# Patient Record
Sex: FEMALE | Employment: PART TIME | URBAN - METROPOLITAN AREA
[De-identification: names, ages, dates, MRNs, and addresses within clinical notes are randomized per-mention and may not be internally consistent; named-entity substitution may affect disease eponyms.]

---

## 2018-01-26 ENCOUNTER — OFFICE VISIT (OUTPATIENT)
Dept: ORTHOPEDIC SURGERY | Age: 63
End: 2018-01-26

## 2018-01-26 VITALS
BODY MASS INDEX: 31.89 KG/M2 | TEMPERATURE: 98 F | HEIGHT: 64 IN | SYSTOLIC BLOOD PRESSURE: 146 MMHG | HEART RATE: 81 BPM | WEIGHT: 186.8 LBS | OXYGEN SATURATION: 99 % | DIASTOLIC BLOOD PRESSURE: 71 MMHG | RESPIRATION RATE: 18 BRPM

## 2018-01-26 DIAGNOSIS — M79.642 LEFT HAND PAIN: ICD-10-CM

## 2018-01-26 DIAGNOSIS — M25.562 CHRONIC PAIN OF LEFT KNEE: Primary | ICD-10-CM

## 2018-01-26 DIAGNOSIS — G89.29 CHRONIC PAIN OF LEFT KNEE: Primary | ICD-10-CM

## 2018-01-26 DIAGNOSIS — M65.352 TRIGGER LITTLE FINGER OF LEFT HAND: ICD-10-CM

## 2018-01-26 DIAGNOSIS — M17.12 PRIMARY OSTEOARTHRITIS OF LEFT KNEE: ICD-10-CM

## 2018-01-26 RX ORDER — BETAMETHASONE SODIUM PHOSPHATE AND BETAMETHASONE ACETATE 3; 3 MG/ML; MG/ML
6 INJECTION, SUSPENSION INTRA-ARTICULAR; INTRALESIONAL; INTRAMUSCULAR; SOFT TISSUE ONCE
Qty: 1 ML | Refills: 0
Start: 2018-01-26 | End: 2018-01-26

## 2018-01-26 NOTE — PROGRESS NOTES
Patient: Aimee Jimenez                MRN: [id number]       SSN: [id number]  YOB: 1955        AGE: 65 y.o. SEX: female  Body mass index is 32.06 kg/(m^2). PCP: No primary care provider on file. 01/26/18  HISTORY: Iván Garnett presents today with left fifth trigger finger pain and left knee pain. The left knee pain she is very fed up and frustrated with. It is severe. She has pain at night and pain with activities of daily living. She has tried injections, anti-inflammatories, and therapy to no avail. She has gone into a valgus collapse pattern, i.e. knock-kneed and instability does cause her problems as well. She has night pain and difficulties with stairs. Otherwise, she denies fevers, chills, night sweats, or weight loss. She denies other joint problems. PHYSICAL EXAMINATION:  On examination today, she is a pleasant enough lady. She stands in at least 15-18° of valgus, most of which is correctable passively. The hips are noncontributory. Both feet are warm and well-perfused. There is good pulse present. She is missing a couple degrees of extension. She bends fairly well. With regards to the left hand, the wrist is noncontributory. She has thickening of the A1 pulley at the base of the fifth. RADIOGRAPHS:  Review of her x-rays, AP, tunnel, lateral, and skyline, confirm end-staged arthritis of the left knee. The hand is negative. IMPRESSION:  My overall impression is trigger finger, fifth, and end-staged arthritis of the left knee with valgus deformity. PROCEDURE:  Under aseptic conditions and after informed, written consent with a time out, the left fifth trigger finger A1 pulley was injected with a half and half Celestone preparation, which was well tolerated.     PLAN:  We had a lengthy discussion regarding risks and benefits involving total joint surgery including but not limited to bleeding, infection, DVT, pulmonary embolism, anesthetic complications, blood loss requiring transfusion, pain, stiffness, arthrofibrosis, implant longevity, and we also discussed that knee replacements are not 100% pain-free, that she will have to actively bend and straighten the knee 10 times a day to avoid arthrofibrosis and stiffness, and that the first few weeks after surgery are fairly rough. All questions were answered, and he would like to proceed. She will return to see us in a few weeks time to check on her finger. REVIEW OF SYSTEMS:      CON: negative for weight loss, fever  EYE: negative for double vision  ENT: negative for hoarseness  RS:   negative for Tb  GI:    negative for blood in stool  :  negative for blood in urine  Other systems reviewed and noted below. Past Medical History:   Diagnosis Date    Arthritis        History reviewed. No pertinent family history. Current Outpatient Prescriptions   Medication Sig Dispense Refill    betamethasone (CELESTONE SOLUSPAN) 6 mg/mL injection 1 mL by Intra artICUlar route once for 1 dose. 1 mL 0       No Known Allergies    History reviewed. No pertinent surgical history. Social History     Social History    Marital status:      Spouse name: N/A    Number of children: N/A    Years of education: N/A     Occupational History    Not on file. Social History Main Topics    Smoking status: Former Smoker    Smokeless tobacco: Never Used    Alcohol use No    Drug use: No    Sexual activity: Not on file     Other Topics Concern    Not on file     Social History Narrative    No narrative on file       Visit Vitals    /71    Pulse 81    Temp 98 °F (36.7 °C) (Oral)    Resp 18    Ht 5' 4\" (1.626 m)    Wt 186 lb 12.8 oz (84.7 kg)    SpO2 99%    BMI 32.06 kg/m2         PHYSICAL EXAMINATION:  GENERAL: Alert and oriented x3, in no acute distress, well-developed, well-nourished, afebrile. HEART: No JVD.   EYES: No scleral icterus   NECK: No significant lymphadenopathy   LUNGS: No respiratory compromise or indrawing  ABDOMEN: Soft, non-tender, non-distended. Electronically signed by:  Alla Araiza MD

## 2018-03-22 ENCOUNTER — TELEPHONE (OUTPATIENT)
Dept: ORTHOPEDIC SURGERY | Age: 63
End: 2018-03-22

## 2018-03-22 NOTE — TELEPHONE ENCOUNTER
Patient advised she may  DMV form at [facility] location.

## 2018-03-22 NOTE — TELEPHONE ENCOUNTER
PATIENT IS REQUESTING A HANDICAP STICKER FROM DR. Nathaniel Nance. PLEASE ADVISE PATIENT [phone].

## 2018-03-26 ENCOUNTER — DOCUMENTATION ONLY (OUTPATIENT)
Dept: ORTHOPEDIC SURGERY | Age: 63
End: 2018-03-26

## 2018-03-26 NOTE — PROGRESS NOTES
Pt dropped off [place] paperwork to the [facility] location on 3/23/18. Please advise pt @ [phone] when complete.

## 2018-03-30 ENCOUNTER — TELEPHONE (OUTPATIENT)
Dept: ORTHOPEDIC SURGERY | Age: 63
End: 2018-03-30

## 2018-03-30 DIAGNOSIS — M17.12 PRIMARY OSTEOARTHRITIS OF LEFT KNEE: ICD-10-CM

## 2018-03-30 DIAGNOSIS — Z01.818 PREOP EXAMINATION: Primary | ICD-10-CM

## 2018-03-30 NOTE — TELEPHONE ENCOUNTER
Patient's SX has been moved from 4/30/18 up to 4/9/18. She dropped paperwork off at the [facility] office and her job is requesting paperwork back prior to [facility]. Please call her when completed. [phone]

## 2018-04-18 ENCOUNTER — HOSPITAL ENCOUNTER (OUTPATIENT)
Dept: PREADMISSION TESTING | Age: 63
Discharge: HOME | End: 2018-04-18
Payer: COMMERCIAL

## 2018-04-18 ENCOUNTER — HOSPITAL ENCOUNTER (OUTPATIENT)
Dept: GENERAL RADIOLOGY | Age: 63
Discharge: HOME | End: 2018-04-18
Payer: COMMERCIAL

## 2018-04-18 DIAGNOSIS — Z01.818 PREOP EXAMINATION: ICD-10-CM

## 2018-04-18 DIAGNOSIS — M17.12 PRIMARY OSTEOARTHRITIS OF LEFT KNEE: ICD-10-CM

## 2018-04-18 LAB
ABO + RH BLD: NORMAL
ALBUMIN SERPL-MCNC: 4.1 G/DL (ref 3.4–5)
ANION GAP SERPL CALC-SCNC: 7 MMOL/L (ref 3–18)
APPEARANCE UR: CLEAR
APTT PPP: 33.2 SEC (ref 23–36.4)
ATRIAL RATE: 77 BPM
BACTERIA URNS QL MICRO: ABNORMAL /HPF
BASOPHILS # BLD: 0 K/UL (ref 0–0.1)
BASOPHILS NFR BLD: 0 % (ref 0–2)
BILIRUB UR QL: NEGATIVE
BLOOD GROUP ANTIBODIES SERPL: NORMAL
BUN SERPL-MCNC: 12 MG/DL (ref 7–18)
BUN/CREAT SERPL: 21 (ref 12–20)
CALCIUM SERPL-MCNC: 9.8 MG/DL (ref 8.5–10.1)
CALCULATED P AXIS, ECG09: 70 DEGREES
CALCULATED R AXIS, ECG10: 6 DEGREES
CALCULATED T AXIS, ECG11: 7 DEGREES
CHLORIDE SERPL-SCNC: 103 MMOL/L (ref 100–108)
CO2 SERPL-SCNC: 28 MMOL/L (ref 21–32)
COLOR UR: YELLOW
CREAT SERPL-MCNC: 0.56 MG/DL (ref 0.6–1.3)
DIAGNOSIS, 93000: NORMAL
DIFFERENTIAL METHOD BLD: NORMAL
EOSINOPHIL # BLD: 0.1 K/UL (ref 0–0.4)
EOSINOPHIL NFR BLD: 1 % (ref 0–5)
EPITH CASTS URNS QL MICRO: ABNORMAL /LPF (ref 0–5)
ERYTHROCYTE [DISTWIDTH] IN BLOOD BY AUTOMATED COUNT: 12.3 % (ref 11.6–14.5)
EST. AVERAGE GLUCOSE BLD GHB EST-MCNC: 131 MG/DL
GLUCOSE SERPL-MCNC: 113 MG/DL (ref 74–99)
GLUCOSE UR STRIP.AUTO-MCNC: NEGATIVE MG/DL
HBA1C MFR BLD: 6.2 % (ref 4.2–5.6)
HCT VFR BLD AUTO: 42.8 % (ref 35–45)
HGB BLD-MCNC: 14.5 G/DL (ref 12–16)
HGB UR QL STRIP: ABNORMAL
INR PPP: 1 (ref 0.8–1.2)
KETONES UR QL STRIP.AUTO: NEGATIVE MG/DL
LEUKOCYTE ESTERASE UR QL STRIP.AUTO: NEGATIVE
LYMPHOCYTES # BLD: 2.1 K/UL (ref 0.9–3.6)
LYMPHOCYTES NFR BLD: 26 % (ref 21–52)
MCH RBC QN AUTO: 29.1 PG (ref 24–34)
MCHC RBC AUTO-ENTMCNC: 33.9 G/DL (ref 31–37)
MCV RBC AUTO: 85.8 FL (ref 74–97)
MONOCYTES # BLD: 0.4 K/UL (ref 0.05–1.2)
MONOCYTES NFR BLD: 5 % (ref 3–10)
NEUTS SEG # BLD: 5.3 K/UL (ref 1.8–8)
NEUTS SEG NFR BLD: 68 % (ref 40–73)
NITRITE UR QL STRIP.AUTO: NEGATIVE
P-R INTERVAL, ECG05: 130 MS
PH UR STRIP: 5 [PH] (ref 5–8)
PLATELET # BLD AUTO: 291 K/UL (ref 135–420)
PMV BLD AUTO: 10 FL (ref 9.2–11.8)
POTASSIUM SERPL-SCNC: 4.2 MMOL/L (ref 3.5–5.5)
PROT UR STRIP-MCNC: NEGATIVE MG/DL
PROTHROMBIN TIME: 12.4 SEC (ref 11.5–15.2)
Q-T INTERVAL, ECG07: 364 MS
QRS DURATION, ECG06: 88 MS
QTC CALCULATION (BEZET), ECG08: 411 MS
RBC # BLD AUTO: 4.99 M/UL (ref 4.2–5.3)
RBC #/AREA URNS HPF: ABNORMAL /HPF (ref 0–5)
SODIUM SERPL-SCNC: 138 MMOL/L (ref 136–145)
SP GR UR REFRACTOMETRY: 1.02 (ref 1–1.03)
SPECIMEN EXP DATE BLD: NORMAL
UROBILINOGEN UR QL STRIP.AUTO: 0.2 EU/DL (ref 0.2–1)
VENTRICULAR RATE, ECG03: 77 BPM
WBC # BLD AUTO: 7.8 K/UL (ref 4.6–13.2)
WBC URNS QL MICRO: NEGATIVE /HPF (ref 0–4)

## 2018-04-18 PROCEDURE — 85025 COMPLETE CBC W/AUTO DIFF WBC: CPT | Performed by: ORTHOPAEDIC SURGERY

## 2018-04-18 PROCEDURE — 81001 URINALYSIS AUTO W/SCOPE: CPT | Performed by: ORTHOPAEDIC SURGERY

## 2018-04-18 PROCEDURE — 85610 PROTHROMBIN TIME: CPT | Performed by: ORTHOPAEDIC SURGERY

## 2018-04-18 PROCEDURE — 86900 BLOOD TYPING SEROLOGIC ABO: CPT | Performed by: ORTHOPAEDIC SURGERY

## 2018-04-18 PROCEDURE — 85730 THROMBOPLASTIN TIME PARTIAL: CPT | Performed by: ORTHOPAEDIC SURGERY

## 2018-04-18 PROCEDURE — 83036 HEMOGLOBIN GLYCOSYLATED A1C: CPT | Performed by: ORTHOPAEDIC SURGERY

## 2018-04-18 PROCEDURE — 36415 COLL VENOUS BLD VENIPUNCTURE: CPT | Performed by: ORTHOPAEDIC SURGERY

## 2018-04-18 PROCEDURE — 93005 ELECTROCARDIOGRAM TRACING: CPT

## 2018-04-18 PROCEDURE — 82040 ASSAY OF SERUM ALBUMIN: CPT | Performed by: ORTHOPAEDIC SURGERY

## 2018-04-18 PROCEDURE — 80048 BASIC METABOLIC PNL TOTAL CA: CPT | Performed by: ORTHOPAEDIC SURGERY

## 2018-04-18 PROCEDURE — 87086 URINE CULTURE/COLONY COUNT: CPT | Performed by: ORTHOPAEDIC SURGERY

## 2018-04-18 PROCEDURE — 71046 X-RAY EXAM CHEST 2 VIEWS: CPT

## 2018-04-18 RX ORDER — ERGOCALCIFEROL 1.25 MG/1
5000 CAPSULE ORAL DAILY
Refills: 0 | COMMUNITY
Start: 2018-02-22

## 2018-04-19 LAB
BACTERIA SPEC CULT: NORMAL
SERVICE CMNT-IMP: NORMAL

## 2018-04-19 NOTE — PROGRESS NOTES
Anh Ludwig attended the Joint Replacement Pre-Operative class on 4/18/18. Topics discussed included surgery preparation, what to expect the day of surgery, medications, physical and occupational therapy, and discharge planning. It was discussed that this is considered an elective surgery and that prior to the surgery they need to make decisions such as arranging for help at home once they are discharged. She was given a knee patient education notebook to take home. Opportunity was given to ask questions and phone number of the Orthopaedic Nurse Clinician was given for any questions or concerns that may arise later.

## 2018-04-24 ENCOUNTER — OFFICE VISIT (OUTPATIENT)
Dept: ORTHOPEDIC SURGERY | Facility: CLINIC | Age: 63
End: 2018-04-24

## 2018-04-24 DIAGNOSIS — M17.12 PRIMARY OSTEOARTHRITIS OF LEFT KNEE: Primary | ICD-10-CM

## 2018-04-24 DIAGNOSIS — Z01.818 ENCOUNTER FOR PREOPERATIVE EXAMINATION FOR GENERAL SURGICAL PROCEDURE: ICD-10-CM

## 2018-04-25 ENCOUNTER — OFFICE VISIT (OUTPATIENT)
Dept: ORTHOPEDIC SURGERY | Facility: CLINIC | Age: 63
End: 2018-04-25

## 2018-04-25 VITALS
SYSTOLIC BLOOD PRESSURE: 139 MMHG | OXYGEN SATURATION: 99 % | HEIGHT: 64 IN | DIASTOLIC BLOOD PRESSURE: 72 MMHG | TEMPERATURE: 98.3 F | HEART RATE: 88 BPM | BODY MASS INDEX: 31.31 KG/M2 | RESPIRATION RATE: 18 BRPM | WEIGHT: 183.4 LBS

## 2018-04-25 DIAGNOSIS — M17.12 PRIMARY OSTEOARTHRITIS OF LEFT KNEE: Primary | ICD-10-CM

## 2018-04-25 RX ORDER — ACETAMINOPHEN 325 MG/1
1000 TABLET ORAL ONCE
Status: CANCELLED | OUTPATIENT
Start: 2018-04-25 | End: 2018-04-25

## 2018-04-25 RX ORDER — CELECOXIB 100 MG/1
400 CAPSULE ORAL ONCE
Status: CANCELLED | OUTPATIENT
Start: 2018-04-25 | End: 2018-04-25

## 2018-04-25 RX ORDER — PREGABALIN 25 MG/1
75 CAPSULE ORAL ONCE
Status: CANCELLED | OUTPATIENT
Start: 2018-04-25 | End: 2018-04-25

## 2018-04-25 RX ORDER — WARFARIN 1 MG/1
10 TABLET ORAL ONCE
Status: CANCELLED | OUTPATIENT
Start: 2018-04-25 | End: 2018-04-25

## 2018-04-25 NOTE — H&P
[address] [facility], [place], operated by [facility], [address] [facility].           HISTORY & PHYSICAL      Patient: Tari Pelletier                MRN: [id number]       SSN: [id number]  YOB: 1955        AGE: 59 y.o. SEX: female  Body mass index is 31.48 kg/(m^2). PCP: Paula Araujo MD  04/25/18      CC: left knee end stage OA  Problem List Items Addressed This Visit     None      Visit Diagnoses     Primary osteoarthritis of left knee    -  Primary            HPI:  The patient is a pleasant 59 y.o. whom has end stage OA of their Left knee and has failed conservative treatment including but not limited to NSAIDS, cortisone injections, viscosupplementation, PT, and pain medicine. Due to the current findings and affected activities of daily living, surgical intervention is indicated. The alternatives, risks, complications, as well as expected outcome were discussed. These include but are not limited to infection, blood loss, need for blood transfusion, neurovascular damage, DVT, PE,  post-op stiffness and pain, leg length discrepancy, dislocation, anesthetic complications, prothesis longevity, need for more surgery, MI, stroke, and even death. The patient understands and wishes to proceed with surgery. Past Medical History:   Diagnosis Date    Arthritis          Current Outpatient Prescriptions:     VITAMIN D2 50,000 unit capsule, Take 50,000 Units by mouth every Monday, Wednesday, Friday., Disp: , Rfl: 0    No Known Allergies    Social History     Social History    Marital status:      Spouse name: N/A    Number of children: N/A    Years of education: N/A     Occupational History    Not on file.      Social History Main Topics    Smoking status: Former Smoker    Smokeless tobacco: Never Used    Alcohol use No    Drug use: No    Sexual activity: Not on file     Other Topics Concern    Not on file     Social History Narrative       Past Surgical History:   Procedure Laterality Date    HX COLONOSCOPY  2009    HX HEMORRHOIDECTOMY  2016    HX HYSTERECTOMY         Family History:  Non-contributory. PE:  Visit Vitals    /72    Pulse 88    Temp 98.3 °F (36.8 °C) (Oral)    Resp 18    Ht 5' 4\" (1.626 m)    Wt 183 lb 6.4 oz (83.2 kg)    SpO2 99%    BMI 31.48 kg/m2     A&O X3, NAD, well develop, well nourished  Heart: S1-S2, rrr  Lungs: CTA bilat  Abd: soft, nt, nt, + bs in all quadrants  Ext:  Pos distal pulses to DP, PT      X-ray: left knee shows end stage OA    Labs: labs were reviewed and wnl.  ua neg    A:  Left  knee end stage OA    P:  At this point we will move forward with surgery. Again, the alternatives, risks, complications, as well as expected outcome were discussed and the patient wishes to proceed with surgery. Pt has been instructed to stop aspirin, nsaids, rheumatologic medications and blood thinners. They have also been instructed to continue on any heart and bp meds and to take them the morning of surgery with sips of water. The patient will require in patient admission due to above stated medical conditions as well the the surgical challenges given the anatomy and bone quality.          Marina Larson

## 2018-04-25 NOTE — PATIENT INSTRUCTIONS
Patient: Ying Hinkle                MRN: [id number]       SSN: [id number]  YOB: 1955        AGE: 59 y.o. SEX: female  Body mass index is 31.48 kg/(m^2). 04/25/18    DO:  1:  Sit with the leg out straight 5-10 min every hour while awake. Keep the toes pointed       up towards the harriet. 2.  Bend your knee 5-10 min every hour. Bend it to the point of pain and hold it for 5-10       Min. 3.  ICE your knee 20 min every hour    DO NOT:    1. Do not place anything under your knee to prop it up  2. Do not sit in the recliner chair. Patient: Ying Hinkle                MRN: [id number]       SSN: [id number]  YOB: 1955        AGE: 59 y.o. SEX: female  Body mass index is 31.48 kg/(m^2). 04/25/18    DO:  1:  Sit with the leg out straight 5-10 min every hour while awake. Keep the toes pointed       up towards the harriet. 2.  Bend your knee 5-10 min every hour. Bend it to the point of pain and hold it for 5-10       Min. 3.  ICE your knee 20 min every hour    DO NOT:    1. Do not place anything under your knee to prop it up  2. Do not sit in the recliner chair.

## 2018-05-01 ENCOUNTER — TELEPHONE (OUTPATIENT)
Dept: ORTHOPEDIC SURGERY | Facility: CLINIC | Age: 63
End: 2018-05-01

## 2018-05-01 ENCOUNTER — ANESTHESIA EVENT (OUTPATIENT)
Dept: SURGERY | Age: 63
DRG: 470 | End: 2018-05-01
Payer: COMMERCIAL

## 2018-05-01 NOTE — TELEPHONE ENCOUNTER
PATIENT CALLED FOR DR. Ann Marie Prescott. PATIENT SAID SHE IS HAVING SX DONE TOMORROW  5/2/18 FOR HER LEFT KNEE BY DR. Ann Marie Prescott. PATIENT WOULD LIKE TO KNOW IF IT IS OKAY FOR HER TO TAKE TYLENOL MEDICATION. PATIENT TEL. [phone].

## 2018-05-01 NOTE — TELEPHONE ENCOUNTER
Spoke with patient, she stated she has a headache and was wondering if it was ok for her to take tylenol. Advised patient that per LAMAR Ramos ok to take OTC tylenol as directed. Patient verbalized understanding.

## 2018-05-02 ENCOUNTER — ANESTHESIA (OUTPATIENT)
Dept: SURGERY | Age: 63
DRG: 470 | End: 2018-05-02
Payer: COMMERCIAL

## 2018-05-02 ENCOUNTER — APPOINTMENT (OUTPATIENT)
Dept: GENERAL RADIOLOGY | Age: 63
DRG: 470 | End: 2018-05-02
Attending: PHYSICIAN ASSISTANT
Payer: COMMERCIAL

## 2018-05-02 ENCOUNTER — HOSPITAL ENCOUNTER (INPATIENT)
Age: 63
LOS: 1 days | Discharge: HOME HEALTH | DRG: 470 | End: 2018-05-03
Attending: ORTHOPAEDIC SURGERY | Admitting: ORTHOPAEDIC SURGERY
Payer: COMMERCIAL

## 2018-05-02 DIAGNOSIS — M17.10 ARTHRITIS OF KNEE: Primary | ICD-10-CM

## 2018-05-02 PROCEDURE — 77030019557 HC ELECTRD VES SEAL MEDT -F: Performed by: ORTHOPAEDIC SURGERY

## 2018-05-02 PROCEDURE — 74011250636 HC RX REV CODE- 250/636

## 2018-05-02 PROCEDURE — 74011000258 HC RX REV CODE- 258: Performed by: ORTHOPAEDIC SURGERY

## 2018-05-02 PROCEDURE — 77030012935 HC DRSG AQUACEL BMS -B: Performed by: ORTHOPAEDIC SURGERY

## 2018-05-02 PROCEDURE — 77030012890: Performed by: ORTHOPAEDIC SURGERY

## 2018-05-02 PROCEDURE — 77030027138 HC INCENT SPIROMETER -A

## 2018-05-02 PROCEDURE — 74011250636 HC RX REV CODE- 250/636: Performed by: NURSE ANESTHETIST, CERTIFIED REGISTERED

## 2018-05-02 PROCEDURE — 74011000250 HC RX REV CODE- 250: Performed by: PHYSICIAN ASSISTANT

## 2018-05-02 PROCEDURE — 74011250637 HC RX REV CODE- 250/637: Performed by: NURSE ANESTHETIST, CERTIFIED REGISTERED

## 2018-05-02 PROCEDURE — 74011000258 HC RX REV CODE- 258

## 2018-05-02 PROCEDURE — 0SRD0J9 REPLACEMENT OF LEFT KNEE JOINT WITH SYNTHETIC SUBSTITUTE, CEMENTED, OPEN APPROACH: ICD-10-PCS | Performed by: ORTHOPAEDIC SURGERY

## 2018-05-02 PROCEDURE — C9290 INJ, BUPIVACAINE LIPOSOME: HCPCS | Performed by: PHYSICIAN ASSISTANT

## 2018-05-02 PROCEDURE — 77030002933 HC SUT MCRYL J&J -A: Performed by: ORTHOPAEDIC SURGERY

## 2018-05-02 PROCEDURE — C1713 ANCHOR/SCREW BN/BN,TIS/BN: HCPCS | Performed by: ORTHOPAEDIC SURGERY

## 2018-05-02 PROCEDURE — 77030018883 HC BLD SAW SAG4 STRY -B: Performed by: ORTHOPAEDIC SURGERY

## 2018-05-02 PROCEDURE — 77030029494 HC CLD THER UNIT ICMN DJOR -B: Performed by: ORTHOPAEDIC SURGERY

## 2018-05-02 PROCEDURE — 74011250637 HC RX REV CODE- 250/637: Performed by: PHYSICIAN ASSISTANT

## 2018-05-02 PROCEDURE — 77030013708 HC HNDPC SUC IRR PULS STRY –B: Performed by: ORTHOPAEDIC SURGERY

## 2018-05-02 PROCEDURE — 77030003601 HC NDL NRV BLK BBMI -A: Performed by: ORTHOPAEDIC SURGERY

## 2018-05-02 PROCEDURE — 77030008467 HC STPLR SKN COVD -B: Performed by: ORTHOPAEDIC SURGERY

## 2018-05-02 PROCEDURE — 76060000035 HC ANESTHESIA 2 TO 2.5 HR: Performed by: ORTHOPAEDIC SURGERY

## 2018-05-02 PROCEDURE — 74011000250 HC RX REV CODE- 250: Performed by: ORTHOPAEDIC SURGERY

## 2018-05-02 PROCEDURE — 76010000131 HC OR TIME 2 TO 2.5 HR: Performed by: ORTHOPAEDIC SURGERY

## 2018-05-02 PROCEDURE — 74011250636 HC RX REV CODE- 250/636: Performed by: PHYSICIAN ASSISTANT

## 2018-05-02 PROCEDURE — 77030020782 HC GWN BAIR PAWS FLX 3M -B: Performed by: ORTHOPAEDIC SURGERY

## 2018-05-02 PROCEDURE — C1776 JOINT DEVICE (IMPLANTABLE): HCPCS | Performed by: ORTHOPAEDIC SURGERY

## 2018-05-02 PROCEDURE — 74011000250 HC RX REV CODE- 250

## 2018-05-02 PROCEDURE — 77030003029 HC SUT VCRL J&J -B: Performed by: ORTHOPAEDIC SURGERY

## 2018-05-02 PROCEDURE — 77030016544 HC BLD SAW RECIP1 STRY -B: Performed by: ORTHOPAEDIC SURGERY

## 2018-05-02 PROCEDURE — 77030020753 HC CUF TRNQT 1BLA STRY -B: Performed by: ORTHOPAEDIC SURGERY

## 2018-05-02 PROCEDURE — 65270000029 HC RM PRIVATE

## 2018-05-02 PROCEDURE — 77030018836 HC SOL IRR NACL ICUM -A: Performed by: ORTHOPAEDIC SURGERY

## 2018-05-02 PROCEDURE — 77030032490 HC SLV COMPR SCD KNE COVD -B: Performed by: ORTHOPAEDIC SURGERY

## 2018-05-02 PROCEDURE — 74011250637 HC RX REV CODE- 250/637: Performed by: ORTHOPAEDIC SURGERY

## 2018-05-02 PROCEDURE — 77030019605: Performed by: ORTHOPAEDIC SURGERY

## 2018-05-02 PROCEDURE — 77030011640 HC PAD GRND REM COVD -A: Performed by: ORTHOPAEDIC SURGERY

## 2018-05-02 PROCEDURE — 74011250636 HC RX REV CODE- 250/636: Performed by: ORTHOPAEDIC SURGERY

## 2018-05-02 PROCEDURE — 74011000258 HC RX REV CODE- 258: Performed by: PHYSICIAN ASSISTANT

## 2018-05-02 PROCEDURE — 74011000250 HC RX REV CODE- 250: Performed by: NURSE ANESTHETIST, CERTIFIED REGISTERED

## 2018-05-02 PROCEDURE — 76210000016 HC OR PH I REC 1 TO 1.5 HR: Performed by: ORTHOPAEDIC SURGERY

## 2018-05-02 PROCEDURE — 77030018719 HC DRSG PTCH ANTIMIC J&J -A: Performed by: ORTHOPAEDIC SURGERY

## 2018-05-02 PROCEDURE — 97161 PT EVAL LOW COMPLEX 20 MIN: CPT

## 2018-05-02 PROCEDURE — 77030012411 HC DRN WND CARD -A: Performed by: ORTHOPAEDIC SURGERY

## 2018-05-02 PROCEDURE — 77030031139 HC SUT VCRL2 J&J -A: Performed by: ORTHOPAEDIC SURGERY

## 2018-05-02 PROCEDURE — 77030026438 HC STYL ET INTUB CARD -A: Performed by: ANESTHESIOLOGY

## 2018-05-02 PROCEDURE — 64447 NJX AA&/STRD FEMORAL NRV IMG: CPT | Performed by: ANESTHESIOLOGY

## 2018-05-02 PROCEDURE — 97116 GAIT TRAINING THERAPY: CPT

## 2018-05-02 PROCEDURE — 76942 ECHO GUIDE FOR BIOPSY: CPT | Performed by: ANESTHESIOLOGY

## 2018-05-02 PROCEDURE — 73560 X-RAY EXAM OF KNEE 1 OR 2: CPT

## 2018-05-02 PROCEDURE — 77030010785: Performed by: ORTHOPAEDIC SURGERY

## 2018-05-02 PROCEDURE — 77030008683 HC TU ET CUF COVD -A: Performed by: ANESTHESIOLOGY

## 2018-05-02 DEVICE — CEMENT BNE 20ML 41GM FULL DOSE PMMA W/ TOBRA M VISC RADPQ: Type: IMPLANTABLE DEVICE | Site: KNEE | Status: FUNCTIONAL

## 2018-05-02 DEVICE — INSERT TIB SZ 3 THK9MM KNEE X3 TOT STBL + TRIATHLON: Type: IMPLANTABLE DEVICE | Site: KNEE | Status: FUNCTIONAL

## 2018-05-02 DEVICE — BASEPLT TIB UNIV TRIATHLN 3 --: Type: IMPLANTABLE DEVICE | Site: KNEE | Status: FUNCTIONAL

## 2018-05-02 DEVICE — COMPONENT KNEE CEM X3 TRIATHLON: Type: IMPLANTABLE DEVICE | Site: KNEE | Status: FUNCTIONAL

## 2018-05-02 DEVICE — COMPNT FEM PS CEM TRIATHLN 4 L -- TRIATHLON: Type: IMPLANTABLE DEVICE | Site: KNEE | Status: FUNCTIONAL

## 2018-05-02 DEVICE — PAT ASYM TRIATHLN X3 29X9MM -- TRIATHLON ASYMMETRIC X3: Type: IMPLANTABLE DEVICE | Site: KNEE | Status: FUNCTIONAL

## 2018-05-02 RX ORDER — SODIUM CHLORIDE 9 MG/ML
100 INJECTION, SOLUTION INTRAVENOUS CONTINUOUS
Status: DISPENSED | OUTPATIENT
Start: 2018-05-02 | End: 2018-05-03

## 2018-05-02 RX ORDER — MORPHINE SULFATE 2 MG/ML
INJECTION, SOLUTION INTRAMUSCULAR; INTRAVENOUS AS NEEDED
Status: DISCONTINUED | OUTPATIENT
Start: 2018-05-02 | End: 2018-05-02 | Stop reason: HOSPADM

## 2018-05-02 RX ORDER — WARFARIN 10 MG/1
10 TABLET ORAL ONCE
Status: COMPLETED | OUTPATIENT
Start: 2018-05-02 | End: 2018-05-02

## 2018-05-02 RX ORDER — FENTANYL CITRATE 50 UG/ML
INJECTION, SOLUTION INTRAMUSCULAR; INTRAVENOUS AS NEEDED
Status: DISCONTINUED | OUTPATIENT
Start: 2018-05-02 | End: 2018-05-02 | Stop reason: HOSPADM

## 2018-05-02 RX ORDER — LIDOCAINE HYDROCHLORIDE 20 MG/ML
INJECTION, SOLUTION EPIDURAL; INFILTRATION; INTRACAUDAL; PERINEURAL AS NEEDED
Status: DISCONTINUED | OUTPATIENT
Start: 2018-05-02 | End: 2018-05-02 | Stop reason: HOSPADM

## 2018-05-02 RX ORDER — PROPOFOL 10 MG/ML
INJECTION, EMULSION INTRAVENOUS AS NEEDED
Status: DISCONTINUED | OUTPATIENT
Start: 2018-05-02 | End: 2018-05-02 | Stop reason: HOSPADM

## 2018-05-02 RX ORDER — DOCUSATE SODIUM 100 MG/1
100 CAPSULE, LIQUID FILLED ORAL 2 TIMES DAILY
Status: DISCONTINUED | OUTPATIENT
Start: 2018-05-02 | End: 2018-05-03 | Stop reason: HOSPADM

## 2018-05-02 RX ORDER — PREGABALIN 75 MG/1
75 CAPSULE ORAL ONCE
Status: COMPLETED | OUTPATIENT
Start: 2018-05-02 | End: 2018-05-02

## 2018-05-02 RX ORDER — ACETAMINOPHEN 500 MG
1000 TABLET ORAL 4 TIMES DAILY
Status: DISCONTINUED | OUTPATIENT
Start: 2018-05-02 | End: 2018-05-03 | Stop reason: HOSPADM

## 2018-05-02 RX ORDER — ROCURONIUM BROMIDE 10 MG/ML
INJECTION, SOLUTION INTRAVENOUS AS NEEDED
Status: DISCONTINUED | OUTPATIENT
Start: 2018-05-02 | End: 2018-05-02 | Stop reason: HOSPADM

## 2018-05-02 RX ORDER — NALOXONE HYDROCHLORIDE 0.4 MG/ML
0.4 INJECTION, SOLUTION INTRAMUSCULAR; INTRAVENOUS; SUBCUTANEOUS AS NEEDED
Status: DISCONTINUED | OUTPATIENT
Start: 2018-05-02 | End: 2018-05-03 | Stop reason: HOSPADM

## 2018-05-02 RX ORDER — POLYMYXIN B 500000 [USP'U]/1
INJECTION, POWDER, LYOPHILIZED, FOR SOLUTION INTRAMUSCULAR; INTRATHECAL; INTRAVENOUS; OPHTHALMIC AS NEEDED
Status: DISCONTINUED | OUTPATIENT
Start: 2018-05-02 | End: 2018-05-02 | Stop reason: HOSPADM

## 2018-05-02 RX ORDER — SODIUM CHLORIDE 0.9 % (FLUSH) 0.9 %
5-10 SYRINGE (ML) INJECTION AS NEEDED
Status: DISCONTINUED | OUTPATIENT
Start: 2018-05-02 | End: 2018-05-02 | Stop reason: HOSPADM

## 2018-05-02 RX ORDER — SODIUM CHLORIDE, SODIUM LACTATE, POTASSIUM CHLORIDE, CALCIUM CHLORIDE 600; 310; 30; 20 MG/100ML; MG/100ML; MG/100ML; MG/100ML
25 INJECTION, SOLUTION INTRAVENOUS CONTINUOUS
Status: DISCONTINUED | OUTPATIENT
Start: 2018-05-02 | End: 2018-05-02 | Stop reason: HOSPADM

## 2018-05-02 RX ORDER — MIDAZOLAM HYDROCHLORIDE 1 MG/ML
2 INJECTION, SOLUTION INTRAMUSCULAR; INTRAVENOUS ONCE
Status: COMPLETED | OUTPATIENT
Start: 2018-05-02 | End: 2018-05-02

## 2018-05-02 RX ORDER — EPINEPHRINE 1 MG/ML
INJECTION, SOLUTION, CONCENTRATE INTRAVENOUS AS NEEDED
Status: DISCONTINUED | OUTPATIENT
Start: 2018-05-02 | End: 2018-05-02 | Stop reason: HOSPADM

## 2018-05-02 RX ORDER — LIDOCAINE HYDROCHLORIDE 10 MG/ML
0.1 INJECTION, SOLUTION EPIDURAL; INFILTRATION; INTRACAUDAL; PERINEURAL AS NEEDED
Status: DISCONTINUED | OUTPATIENT
Start: 2018-05-02 | End: 2018-05-02 | Stop reason: HOSPADM

## 2018-05-02 RX ORDER — ONDANSETRON 2 MG/ML
INJECTION INTRAMUSCULAR; INTRAVENOUS AS NEEDED
Status: DISCONTINUED | OUTPATIENT
Start: 2018-05-02 | End: 2018-05-02 | Stop reason: HOSPADM

## 2018-05-02 RX ORDER — SODIUM CHLORIDE, SODIUM LACTATE, POTASSIUM CHLORIDE, CALCIUM CHLORIDE 600; 310; 30; 20 MG/100ML; MG/100ML; MG/100ML; MG/100ML
75 INJECTION, SOLUTION INTRAVENOUS CONTINUOUS
Status: DISCONTINUED | OUTPATIENT
Start: 2018-05-02 | End: 2018-05-02 | Stop reason: HOSPADM

## 2018-05-02 RX ORDER — KETOROLAC TROMETHAMINE 30 MG/ML
INJECTION, SOLUTION INTRAMUSCULAR; INTRAVENOUS AS NEEDED
Status: DISCONTINUED | OUTPATIENT
Start: 2018-05-02 | End: 2018-05-02 | Stop reason: HOSPADM

## 2018-05-02 RX ORDER — CEFAZOLIN SODIUM 2 G/50ML
2 SOLUTION INTRAVENOUS
Status: COMPLETED | OUTPATIENT
Start: 2018-05-02 | End: 2018-05-02

## 2018-05-02 RX ORDER — SUCCINYLCHOLINE CHLORIDE 20 MG/ML
INJECTION INTRAMUSCULAR; INTRAVENOUS AS NEEDED
Status: DISCONTINUED | OUTPATIENT
Start: 2018-05-02 | End: 2018-05-02 | Stop reason: HOSPADM

## 2018-05-02 RX ORDER — CEFAZOLIN SODIUM 2 G/50ML
2 SOLUTION INTRAVENOUS EVERY 8 HOURS
Status: COMPLETED | OUTPATIENT
Start: 2018-05-02 | End: 2018-05-03

## 2018-05-02 RX ORDER — GLYCOPYRROLATE 0.2 MG/ML
INJECTION INTRAMUSCULAR; INTRAVENOUS AS NEEDED
Status: DISCONTINUED | OUTPATIENT
Start: 2018-05-02 | End: 2018-05-02 | Stop reason: HOSPADM

## 2018-05-02 RX ORDER — EPHEDRINE SULFATE 50 MG/ML
INJECTION, SOLUTION INTRAVENOUS AS NEEDED
Status: DISCONTINUED | OUTPATIENT
Start: 2018-05-02 | End: 2018-05-02 | Stop reason: HOSPADM

## 2018-05-02 RX ORDER — LABETALOL HYDROCHLORIDE 5 MG/ML
INJECTION, SOLUTION INTRAVENOUS AS NEEDED
Status: DISCONTINUED | OUTPATIENT
Start: 2018-05-02 | End: 2018-05-02 | Stop reason: HOSPADM

## 2018-05-02 RX ORDER — SODIUM CHLORIDE 0.9 % (FLUSH) 0.9 %
5-10 SYRINGE (ML) INJECTION EVERY 8 HOURS
Status: DISCONTINUED | OUTPATIENT
Start: 2018-05-02 | End: 2018-05-03 | Stop reason: HOSPADM

## 2018-05-02 RX ORDER — ZOLPIDEM TARTRATE 5 MG/1
5 TABLET ORAL
Status: DISCONTINUED | OUTPATIENT
Start: 2018-05-02 | End: 2018-05-03 | Stop reason: HOSPADM

## 2018-05-02 RX ORDER — DEXAMETHASONE SODIUM PHOSPHATE 4 MG/ML
INJECTION, SOLUTION INTRA-ARTICULAR; INTRALESIONAL; INTRAMUSCULAR; INTRAVENOUS; SOFT TISSUE AS NEEDED
Status: DISCONTINUED | OUTPATIENT
Start: 2018-05-02 | End: 2018-05-02 | Stop reason: HOSPADM

## 2018-05-02 RX ORDER — ONDANSETRON 2 MG/ML
4 INJECTION INTRAMUSCULAR; INTRAVENOUS
Status: DISCONTINUED | OUTPATIENT
Start: 2018-05-02 | End: 2018-05-03 | Stop reason: HOSPADM

## 2018-05-02 RX ORDER — ASPIRIN 325 MG/1
325 TABLET, FILM COATED ORAL 2 TIMES DAILY
Status: DISCONTINUED | OUTPATIENT
Start: 2018-05-03 | End: 2018-05-03 | Stop reason: HOSPADM

## 2018-05-02 RX ORDER — BUPIVACAINE HYDROCHLORIDE 5 MG/ML
INJECTION, SOLUTION EPIDURAL; INTRACAUDAL AS NEEDED
Status: DISCONTINUED | OUTPATIENT
Start: 2018-05-02 | End: 2018-05-02 | Stop reason: HOSPADM

## 2018-05-02 RX ORDER — OXYCODONE HYDROCHLORIDE 5 MG/1
5-20 TABLET ORAL
Status: DISCONTINUED | OUTPATIENT
Start: 2018-05-02 | End: 2018-05-03 | Stop reason: HOSPADM

## 2018-05-02 RX ORDER — VANCOMYCIN HYDROCHLORIDE 1 G/20ML
INJECTION, POWDER, LYOPHILIZED, FOR SOLUTION INTRAVENOUS AS NEEDED
Status: DISCONTINUED | OUTPATIENT
Start: 2018-05-02 | End: 2018-05-02 | Stop reason: HOSPADM

## 2018-05-02 RX ORDER — CELECOXIB 100 MG/1
200 CAPSULE ORAL 2 TIMES DAILY
Status: DISCONTINUED | OUTPATIENT
Start: 2018-05-02 | End: 2018-05-03 | Stop reason: HOSPADM

## 2018-05-02 RX ORDER — LANOLIN ALCOHOL/MO/W.PET/CERES
1 CREAM (GRAM) TOPICAL 2 TIMES DAILY WITH MEALS
Status: DISCONTINUED | OUTPATIENT
Start: 2018-05-02 | End: 2018-05-03 | Stop reason: HOSPADM

## 2018-05-02 RX ORDER — NEOSTIGMINE METHYLSULFATE 1 MG/ML
INJECTION INTRAVENOUS AS NEEDED
Status: DISCONTINUED | OUTPATIENT
Start: 2018-05-02 | End: 2018-05-02 | Stop reason: HOSPADM

## 2018-05-02 RX ORDER — SODIUM CHLORIDE 0.9 % (FLUSH) 0.9 %
5-10 SYRINGE (ML) INJECTION AS NEEDED
Status: DISCONTINUED | OUTPATIENT
Start: 2018-05-02 | End: 2018-05-03 | Stop reason: HOSPADM

## 2018-05-02 RX ORDER — ACETAMINOPHEN 500 MG
1000 TABLET ORAL ONCE
Status: COMPLETED | OUTPATIENT
Start: 2018-05-02 | End: 2018-05-02

## 2018-05-02 RX ORDER — FENTANYL CITRATE 50 UG/ML
100 INJECTION, SOLUTION INTRAMUSCULAR; INTRAVENOUS ONCE
Status: COMPLETED | OUTPATIENT
Start: 2018-05-02 | End: 2018-05-02

## 2018-05-02 RX ORDER — FAMOTIDINE 20 MG/1
20 TABLET, FILM COATED ORAL ONCE
Status: COMPLETED | OUTPATIENT
Start: 2018-05-02 | End: 2018-05-02

## 2018-05-02 RX ORDER — PREGABALIN 50 MG/1
50 CAPSULE ORAL 2 TIMES DAILY
Status: DISCONTINUED | OUTPATIENT
Start: 2018-05-02 | End: 2018-05-03 | Stop reason: HOSPADM

## 2018-05-02 RX ORDER — DIPHENHYDRAMINE HYDROCHLORIDE 50 MG/ML
12.5 INJECTION, SOLUTION INTRAMUSCULAR; INTRAVENOUS
Status: DISCONTINUED | OUTPATIENT
Start: 2018-05-02 | End: 2018-05-03 | Stop reason: HOSPADM

## 2018-05-02 RX ORDER — ROPIVACAINE HYDROCHLORIDE 2 MG/ML
30 INJECTION, SOLUTION EPIDURAL; INFILTRATION; PERINEURAL
Status: COMPLETED | OUTPATIENT
Start: 2018-05-02 | End: 2018-05-02

## 2018-05-02 RX ORDER — CELECOXIB 400 MG/1
400 CAPSULE ORAL ONCE
Status: COMPLETED | OUTPATIENT
Start: 2018-05-02 | End: 2018-05-02

## 2018-05-02 RX ADMIN — ONDANSETRON 4 MG: 2 INJECTION INTRAMUSCULAR; INTRAVENOUS at 08:27

## 2018-05-02 RX ADMIN — LABETALOL HYDROCHLORIDE 5 MG: 5 INJECTION, SOLUTION INTRAVENOUS at 08:59

## 2018-05-02 RX ADMIN — OXYCODONE HYDROCHLORIDE 10 MG: 5 TABLET ORAL at 13:10

## 2018-05-02 RX ADMIN — DOCUSATE SODIUM 100 MG: 100 CAPSULE, LIQUID FILLED ORAL at 13:11

## 2018-05-02 RX ADMIN — FENTANYL CITRATE 50 MCG: 50 INJECTION INTRAMUSCULAR; INTRAVENOUS at 07:59

## 2018-05-02 RX ADMIN — FAMOTIDINE 20 MG: 20 TABLET ORAL at 07:46

## 2018-05-02 RX ADMIN — LIDOCAINE HYDROCHLORIDE 0.1 ML: 10 INJECTION, SOLUTION EPIDURAL; INFILTRATION; INTRACAUDAL; PERINEURAL at 08:01

## 2018-05-02 RX ADMIN — ACETAMINOPHEN 1000 MG: 500 TABLET, FILM COATED ORAL at 18:58

## 2018-05-02 RX ADMIN — LIDOCAINE HYDROCHLORIDE 100 MG: 20 INJECTION, SOLUTION EPIDURAL; INFILTRATION; INTRACAUDAL; PERINEURAL at 08:33

## 2018-05-02 RX ADMIN — SODIUM CHLORIDE, SODIUM LACTATE, POTASSIUM CHLORIDE, AND CALCIUM CHLORIDE: 600; 310; 30; 20 INJECTION, SOLUTION INTRAVENOUS at 10:06

## 2018-05-02 RX ADMIN — ACETAMINOPHEN 1000 MG: 500 TABLET, FILM COATED ORAL at 21:25

## 2018-05-02 RX ADMIN — ONDANSETRON 4 MG: 2 INJECTION INTRAMUSCULAR; INTRAVENOUS at 10:07

## 2018-05-02 RX ADMIN — FERROUS SULFATE TAB 325 MG (65 MG ELEMENTAL FE) 325 MG: 325 (65 FE) TAB at 18:57

## 2018-05-02 RX ADMIN — ROPIVACAINE HYDROCHLORIDE 60 MG: 2 INJECTION, SOLUTION EPIDURAL; INFILTRATION at 08:03

## 2018-05-02 RX ADMIN — CEFAZOLIN SODIUM 2 G: 2 SOLUTION INTRAVENOUS at 23:57

## 2018-05-02 RX ADMIN — LABETALOL HYDROCHLORIDE 5 MG: 5 INJECTION, SOLUTION INTRAVENOUS at 09:20

## 2018-05-02 RX ADMIN — PROPOFOL 200 MG: 10 INJECTION, EMULSION INTRAVENOUS at 08:33

## 2018-05-02 RX ADMIN — MORPHINE SULFATE 2 MG: 2 INJECTION, SOLUTION INTRAMUSCULAR; INTRAVENOUS at 09:02

## 2018-05-02 RX ADMIN — MIDAZOLAM HYDROCHLORIDE 1 MG: 1 INJECTION, SOLUTION INTRAMUSCULAR; INTRAVENOUS at 07:59

## 2018-05-02 RX ADMIN — FENTANYL CITRATE 100 MCG: 50 INJECTION, SOLUTION INTRAMUSCULAR; INTRAVENOUS at 08:27

## 2018-05-02 RX ADMIN — DEXAMETHASONE SODIUM PHOSPHATE 8 MG: 4 INJECTION, SOLUTION INTRA-ARTICULAR; INTRALESIONAL; INTRAMUSCULAR; INTRAVENOUS; SOFT TISSUE at 08:37

## 2018-05-02 RX ADMIN — EPHEDRINE SULFATE 10 MG: 50 INJECTION, SOLUTION INTRAVENOUS at 10:10

## 2018-05-02 RX ADMIN — CEFAZOLIN SODIUM 2 G: 2 SOLUTION INTRAVENOUS at 08:27

## 2018-05-02 RX ADMIN — TRANEXAMIC ACID 1 G: 100 INJECTION, SOLUTION INTRAVENOUS at 10:20

## 2018-05-02 RX ADMIN — SODIUM CHLORIDE, SODIUM LACTATE, POTASSIUM CHLORIDE, AND CALCIUM CHLORIDE 25 ML/HR: 600; 310; 30; 20 INJECTION, SOLUTION INTRAVENOUS at 07:46

## 2018-05-02 RX ADMIN — CELECOXIB 400 MG: 400 CAPSULE ORAL at 07:45

## 2018-05-02 RX ADMIN — ROCURONIUM BROMIDE 30 MG: 10 INJECTION, SOLUTION INTRAVENOUS at 08:41

## 2018-05-02 RX ADMIN — ZOLPIDEM TARTRATE 5 MG: 5 TABLET, FILM COATED ORAL at 21:30

## 2018-05-02 RX ADMIN — MORPHINE SULFATE 2 MG: 2 INJECTION, SOLUTION INTRAMUSCULAR; INTRAVENOUS at 08:40

## 2018-05-02 RX ADMIN — CELECOXIB 200 MG: 100 CAPSULE ORAL at 18:58

## 2018-05-02 RX ADMIN — DOCUSATE SODIUM 100 MG: 100 CAPSULE, LIQUID FILLED ORAL at 18:58

## 2018-05-02 RX ADMIN — PREGABALIN 75 MG: 75 CAPSULE ORAL at 07:45

## 2018-05-02 RX ADMIN — SODIUM CHLORIDE 100 ML/HR: 900 INJECTION, SOLUTION INTRAVENOUS at 12:17

## 2018-05-02 RX ADMIN — LABETALOL HYDROCHLORIDE 5 MG: 5 INJECTION, SOLUTION INTRAVENOUS at 09:31

## 2018-05-02 RX ADMIN — CEFAZOLIN SODIUM 2 G: 2 SOLUTION INTRAVENOUS at 18:58

## 2018-05-02 RX ADMIN — GLYCOPYRROLATE 0.4 MG: 0.2 INJECTION INTRAMUSCULAR; INTRAVENOUS at 10:07

## 2018-05-02 RX ADMIN — LABETALOL HYDROCHLORIDE 5 MG: 5 INJECTION, SOLUTION INTRAVENOUS at 09:04

## 2018-05-02 RX ADMIN — ACETAMINOPHEN 1000 MG: 500 TABLET, FILM COATED ORAL at 07:45

## 2018-05-02 RX ADMIN — MORPHINE SULFATE 2 MG: 2 INJECTION, SOLUTION INTRAMUSCULAR; INTRAVENOUS at 08:48

## 2018-05-02 RX ADMIN — NEOSTIGMINE METHYLSULFATE 3 MG: 1 INJECTION INTRAVENOUS at 10:07

## 2018-05-02 RX ADMIN — TRANEXAMIC ACID 1 G: 100 INJECTION, SOLUTION INTRAVENOUS at 08:39

## 2018-05-02 RX ADMIN — PREGABALIN 50 MG: 50 CAPSULE ORAL at 18:58

## 2018-05-02 RX ADMIN — WARFARIN SODIUM 10 MG: 10 TABLET ORAL at 07:45

## 2018-05-02 RX ADMIN — Medication 10 ML: at 21:30

## 2018-05-02 RX ADMIN — SUCCINYLCHOLINE CHLORIDE 120 MG: 20 INJECTION INTRAMUSCULAR; INTRAVENOUS at 08:33

## 2018-05-02 NOTE — ANESTHESIA PROCEDURE NOTES
Peripheral Block    Start time: 5/2/2018 7:58 AM  End time: 5/2/2018 8:08 AM  Performed by: Mary Beth Perez  Authorized by: Mary Beth Perez       Pre-procedure: Indications: at surgeon's request and post-op pain management    Preanesthetic Checklist: patient identified, risks and benefits discussed, site marked, timeout performed, anesthesia consent given and patient being monitored    Timeout Time: 07:59          Block Type:   Block Type:   Adductor canal  Laterality:  Left  Monitoring:  Standard ASA monitoring, continuous pulse ox, frequent vital sign checks, heart rate, responsive to questions and oxygen  Injection Technique:  Single shot  Procedures: ultrasound guided and nerve stimulator    Patient Position: supine  Prep: chlorhexidine    Location:  Mid thigh  Needle Gauge:  21 G  Needle Localization:  Infiltration, nerve stimulator and ultrasound guidance  Motor Response: minimal motor response >0.4 mA    Medication Injected:  0.2%  ropivacaine  Volume (mL):  30  Add'l Medication Injected:  1.0%  lidocaine  Volume (mL):  3    Assessment:  Number of attempts:  1  Injection Assessment:  Incremental injection every 5 mL, local visualized surrounding nerve on ultrasound, negative aspiration for CSF, negative aspiration for blood, no paresthesia, no intravascular symptoms and ultrasound image on chart  Patient tolerance:  Patient tolerated the procedure well with no immediate complications

## 2018-05-02 NOTE — ANESTHESIA POSTPROCEDURE EVALUATION
Post-Anesthesia Evaluation and Assessment    Patient: Anthony Contreras MRN: [id number]  SSN: [id number]    YOB: 1955  Age: 59 y.o. Sex: female       Cardiovascular Function/Vital Signs  Visit Vitals    /73 (BP 1 Location: Left arm, BP Patient Position: At rest)    Pulse 83    Temp 35.8 °C (96.4 °F)    Resp 18    Ht 5' 4\" (1.626 m)    Wt 83 kg (183 lb)    SpO2 99%    Breastfeeding No    BMI 31.41 kg/m2       Patient is status post general, regional anesthesia for Procedure(s):  LEFT TOTAL KNEE ARTHROPLASTY. Nausea/Vomiting: None    Postoperative hydration reviewed and adequate. Pain:  Pain Scale 1: Numeric (0 - 10) (05/02/18 1108)  Pain Intensity 1: 0 (05/02/18 1108)   Managed    Neurological Status:   Neuro (WDL): Within Defined Limits (05/02/18 1029)   At baseline    Mental Status and Level of Consciousness: Arousable    Pulmonary Status:   O2 Device: Room air (05/02/18 1039)   Adequate oxygenation and airway patent    Complications related to anesthesia: None    Post-anesthesia assessment completed.  No concerns      Signed By: Ragini Herman MD     May 2, 2018

## 2018-05-02 NOTE — H&P (VIEW-ONLY)
[address] [facility], [place], operated by [facility], [address] [address].           HISTORY & PHYSICAL      Patient: Al Acuna                MRN: [id number]       SSN: [id number]  YOB: 1955        AGE: 59 y.o. SEX: female  Body mass index is 31.48 kg/(m^2). PCP: Jessica Lanier MD  04/25/18      CC: left knee end stage OA  Problem List Items Addressed This Visit     None      Visit Diagnoses     Primary osteoarthritis of left knee    -  Primary            HPI:  The patient is a pleasant 59 y.o. whom has end stage OA of their Left knee and has failed conservative treatment including but not limited to NSAIDS, cortisone injections, viscosupplementation, PT, and pain medicine. Due to the current findings and affected activities of daily living, surgical intervention is indicated. The alternatives, risks, complications, as well as expected outcome were discussed. These include but are not limited to infection, blood loss, need for blood transfusion, neurovascular damage, DVT, PE,  post-op stiffness and pain, leg length discrepancy, dislocation, anesthetic complications, prothesis longevity, need for more surgery, MI, stroke, and even death. The patient understands and wishes to proceed with surgery. Past Medical History:   Diagnosis Date    Arthritis          Current Outpatient Prescriptions:     VITAMIN D2 50,000 unit capsule, Take 50,000 Units by mouth every Monday, Wednesday, Friday., Disp: , Rfl: 0    No Known Allergies    Social History     Social History    Marital status:      Spouse name: N/A    Number of children: N/A    Years of education: N/A     Occupational History    Not on file.      Social History Main Topics    Smoking status: Former Smoker    Smokeless tobacco: Never Used    Alcohol use No    Drug use: No    Sexual activity: Not on file     Other Topics Concern    Not on file     Social History Narrative       Past Surgical History:   Procedure Laterality Date    HX COLONOSCOPY  2009    HX HEMORRHOIDECTOMY  2016    HX HYSTERECTOMY         Family History:  Non-contributory. PE:  Visit Vitals    /72    Pulse 88    Temp 98.3 °F (36.8 °C) (Oral)    Resp 18    Ht 5' 4\" (1.626 m)    Wt 183 lb 6.4 oz (83.2 kg)    SpO2 99%    BMI 31.48 kg/m2     A&O X3, NAD, well develop, well nourished  Heart: S1-S2, rrr  Lungs: CTA bilat  Abd: soft, nt, nt, + bs in all quadrants  Ext:  Pos distal pulses to DP, PT      X-ray: left knee shows end stage OA    Labs: labs were reviewed and wnl.  ua neg    A:  Left  knee end stage OA    P:  At this point we will move forward with surgery. Again, the alternatives, risks, complications, as well as expected outcome were discussed and the patient wishes to proceed with surgery. Pt has been instructed to stop aspirin, nsaids, rheumatologic medications and blood thinners. They have also been instructed to continue on any heart and bp meds and to take them the morning of surgery with sips of water. The patient will require in patient admission due to above stated medical conditions as well the the surgical challenges given the anatomy and bone quality.          Analisa Chisholm

## 2018-05-02 NOTE — ANESTHESIA PREPROCEDURE EVALUATION
Anesthetic History   No history of anesthetic complications            Review of Systems / Medical History  Patient summary reviewed, nursing notes reviewed and pertinent labs reviewed    Pulmonary  Within defined limits                 Neuro/Psych   Within defined limits           Cardiovascular  Within defined limits                Exercise tolerance: >4 METS     GI/Hepatic/Renal  Within defined limits              Endo/Other        Obesity and arthritis     Other Findings   Comments: Documentation of current medication  Current medications obtained, documented and obtained? YES      Risk Factors for Postoperative nausea/vomiting:       History of postoperative nausea/vomiting? NO       Female? YES       Motion sickness? NO       Intended opioid administration for postoperative analgesia? YES      Smoking Abstinence:  Current Smoker? NO  Elective Surgery? YES  Seen preoperatively by anesthesiologist or proxy prior to day of surgery? YES  Pt abstained from smoking 24 hours prior to anesthesia?  N/A    Preventive care/screening for High Blood Pressure:  Aged 18 years and older: YES  Screened for high blood pressure: YES  Patients with high blood pressure referred to primary care provider   for BP management: YES                 Physical Exam    Airway  Mallampati: II  TM Distance: 4 - 6 cm  Neck ROM: normal range of motion   Mouth opening: Normal     Cardiovascular  Regular rate and rhythm,  S1 and S2 normal,  no murmur, click, rub, or gallop  Rhythm: regular  Rate: normal         Dental  No notable dental hx       Pulmonary  Breath sounds clear to auscultation               Abdominal  GI exam deferred       Other Findings            Anesthetic Plan    ASA: 2  Anesthesia type: general and regional - femoral single shot      Post-op pain plan if not by surgeon: peripheral nerve block single    Induction: Intravenous  Anesthetic plan and risks discussed with: Patient

## 2018-05-02 NOTE — BRIEF OP NOTE
BRIEF OPERATIVE NOTE    Date of Procedure: 5/2/2018   Preoperative Diagnosis: Osteoarthritis of left knee, unspecified osteoarthritis type [M17.12] with severe 30 deg valgus   Postoperative Diagnosis: Osteoarthritis of left knee, unspecified osteoarthritis type [M17.12]    Procedure(s):  LEFT TOTAL KNEE ARTHROPLASTY difficult  Surgeon(s) and Role:     * Shabnam Martinez MD - Primary         Surgical Assistant: Shanda Bass    Surgical Staff:  Circ-1: Humberto Park RN  Physician Assistant: Iza Shipman PA-C  Scrub Tech-1: Hilary Hazel  Surg Asst-1: Tyler Oppenheim  Event Time In   Incision Start 1872   Incision Close      Anesthesia: General   Estimated Blood Loss: 50ml  Specimens: * No specimens in log *   Findings: same   Complications: none  Implants:   Implant Name Type Inv.  Item Serial No.  Lot No. LRB No. Used Action   CEMENT BNE SIMPLEX TOBRA 4 --  - [id number]  CEMENT BNE SIMPLEX TOBRA 4 --   [employer] [facility] [id number] Left 1 Implanted   CEMENT BNE SIMPLEX TOBRA 4 --  - [id number]  CEMENT BNE SIMPLEX TOBRA 4 --   [employer] [facility] [id number] Left 1 Implanted   COMPNT FEM PS DINESH TRIATHLN 4 L -- TRIATHLON - [id number]  COMPNT FEM PS DINESH TRIATHLN 4 L -- TRIATHLON  [employer] [facility] [id number] Left 1 Implanted   PAT ASYM TRIATHLN X3 29X9MM -- TRIATHLON ASYMMETRIC X3 - [id number]  PAT ASYM TRIATHLN X3 29X9MM -- TRIATHLON ASYMMETRIC X3  [employer] HOW [id number] Left 1 Implanted   BASEPLT TIB UNIV TRIATHLN 3 --  - [id number]  BASEPLT TIB UNIV TRIATHLN 3 --   [employer] [id number] Left 1 Implanted   INSERT TIB X3 PLOY SZ 3 9MM -- TRIATHION TS PLUS - [id number]   INSERT TIB X3 PLOY SZ 3 9MM -- TRIATHION TS PLUS   [employer] HOW [id number] Left 1 Implanted

## 2018-05-02 NOTE — PERIOP NOTES
TRANSFER - OUT REPORT:    Verbal report given to [address] [facility] on [place] [employer]  being transferred to  for routine post - op       Report consisted of patients Situation, Background, Assessment and   Recommendations(SBAR). Information from the following report(s) SBAR and MAR was reviewed with the receiving nurse. Lines:   Peripheral IV 05/02/18 Right Hand (Active)   Site Assessment Clean, dry, & intact 5/2/2018 10:29 AM   Phlebitis Assessment 0 5/2/2018 10:29 AM   Infiltration Assessment 0 5/2/2018 10:29 AM   Dressing Status Clean, dry, & intact 5/2/2018 10:29 AM   Dressing Type Tape;Transparent 5/2/2018 10:29 AM   Hub Color/Line Status Pink; Infusing 5/2/2018 10:29 AM        Opportunity for questions and clarification was provided.       Patient transported with:   Registered Nurse

## 2018-05-02 NOTE — IP AVS SNAPSHOT
[address] Patient: Mervat Bronson MRN: [id number] FVC:[id number]/[id number] About your hospitalization You were admitted on:  May 2, 2018 You last received care in the:  [facility] You were discharged on:  May 3, 2018 Why you were hospitalized Your primary diagnosis was:  Not on File Your diagnoses also included: Arthritis Of Knee Follow-up Information Follow up With Details Comments Contact Info LAMAR Arcos On 5/8/2018 Appointment at 12:00 noon [facility] 
[phone] LAMAR Mclaughlin On 5/17/2018 Appointment at 9:30 at the [facility]. [address] [place] [address] 100 200 [facility] 
[phone] Your Scheduled Appointments Thursday May 17, 2018  9:30 AM EDT  
POST OP with LAMAR Mclaughlin  
[facility] - [address] [place] [address]  
[phone] Discharge Orders Procedure Order Date Status Priority Quantity Spec Type Associated Dx WALKER STANDARD 05/03/18 0826 Normal Routine 1  Arthritis of knee [[id number]] ELEVATED TOILET SEAT 05/03/18 0826 Normal Routine 1  Arthritis of knee [[id number]] COMMODE CHAIR 05/03/18 0826 Normal Routine 1  Arthritis of knee [[id number]] SHOWER CHAIR 05/03/18 0826 Normal Routine 1  Arthritis of knee [[id number]] REFERRAL TO HOME HEALTH 05/03/18 0826 Normal Routine 1  Arthritis of knee [[id number]] Comments: Total knee protocol, wbat Aspirin therapy 
aquacel ag dressing pod 7 and prn A check brenda indicates which time of day the medication should be taken. My Medications START taking these medications Instructions Each Dose to Equal  
 Morning Noon Evening Bedtime  
 buffered aspirin 325 mg tablet Commonly known as:  BUFFERIN Your last dose was: Your next dose is: Take 1 Tab by mouth two (2) times a day. 325 mg  
    
   
   
   
  
 celecoxib 200 mg capsule Commonly known as:  CELEBREX Your last dose was: Your next dose is: Take 1 Cap by mouth two (2) times a day for 90 days. 200 mg  
    
   
   
   
  
 ferrous sulfate 325 mg (65 mg iron) tablet Your last dose was: Your next dose is: Take 1 Tab by mouth two (2) times daily (with meals). 325 mg  
    
   
   
   
  
 oxyCODONE-acetaminophen  mg per tablet Commonly known as:  PERCOCET Your last dose was: Your next dose is: Take 1-2 Tabs by mouth every four (4) hours as needed for Pain. Max Daily Amount: 12 Tabs. 1-2 Tab CONTINUE taking these medications Instructions Each Dose to Equal  
 Morning Noon Evening Bedtime VITAMIN D2 50,000 unit capsule Generic drug:  ergocalciferol Your last dose was: Your next dose is: Take 50,000 Units by mouth every Monday, Wednesday, Friday. [address]62 Units Where to Get Your Medications Information on where to get these meds will be given to you by the nurse or doctor. ! Ask your nurse or doctor about these medications  
  buffered aspirin 325 mg tablet  
 celecoxib 200 mg capsule  
 ferrous sulfate 325 mg (65 mg iron) tablet  
 oxyCODONE-acetaminophen  mg per tablet Opioid Education Prescription Opioids: What You Need to Know: 
 
 
 
F-face looks uneven A-arms unable to move or move unevenly S-speech slurred or non-existent T-time-call 911 as soon as signs and symptoms begin-DO NOT go Back to bed or wait to see if you get better-TIME IS BRAIN. The discharge information has been reviewed with the patient. The patient verbalized understanding. [email or web address] Activation Thank you for requesting access to [email or web address]. Please follow the instructions below to securely access and download your online medical record. [email or web address] allows you to send messages to your doctor, view your test results, renew your prescriptions, schedule appointments, and more. How Do I Sign Up? 1. In your internet browser, go to [email or web address]. 2. Click on the First Time User? Click Here link in the Sign In box. You will see the New Member Sign Up page. 3. Enter your [email or web address] Access Code exactly as it appears below. You will not need to use this code after youve completed the sign-up process. If you do not sign up before the expiration date, you must request a new code. Tails.com Access Code: [email or web address] Expires: 2012  9:42 AM (This is the date your Tails.com access code will ) 4. Enter the last four digits of your Social Security Number (xxxx) and Date of Birth (mm/dd/yyyy) as indicated and click Submit. You will be taken to the next sign-up page. 5. Create a Klash ID. This will be your Klash login ID and cannot be changed, so think of one that is secure and easy to remember. 6. Create a Klash password. You can change your password at any time. 7. Enter your Password Reset Question and Answer. This can be used at a later time if you forget your password. 8. Enter your e-mail address. You will receive e-mail notification when new information is available in [address] 9. Click Sign Up. You can now view and download portions of your medical record. 10. Click the Download Summary menu link to download a portable copy of your medical information. Additional Information If you have questions, please visit the Frequently Asked Questions section of the Klash website at [email or web address] "[email or web address] Remember, Klash is NOT to be used for urgent needs. For medical emergencies, dial 911. Armband removed and shredded Discharge Instructions for Total Knee Replacement Patients · The dressing on your knee will be changed by the Home Health professional at the appropriate time. Keep your incision clean and dry. Do not apply any ointments to the incision. · You may shower as long as you keep you incision dry. When showering, leave your dressing on. The dressing is waterproof as long as the edges are sealed. · Notify your surgeon if: 
· Your temperature is greater than 100.5 · You have pain not controlled by your pain medication · You have increased drainage from your incision · You have increased redness or swelling in your leg · You have chest pain, shortness of breath, or any other problems · Do your exercises as instructed by the home physical therapist. 
 
· Bend and straighten your operative leg every hour. Walk once an hour during normal walking hours. · During periods of inactivity or rest, your leg should be elevated with a rolled towel or folded pillow under the heel to keep the knee straight. · Do not place anything under the knee. · Do not sit in a recliner chair with the footrest elevated. · You may use ice to your knee for 20-30 minutes after exercise and as needed. Do not apply the ice pack directly to your skin. Use a barrier such as your pant leg or a thin towel. · If you have PIOTR hose (the white support stockings), remove them at bedtime and re-apply the hose in the morning for the next 2 weeks. Best of luck with your new knee and [address] YOU for choosing the [address]! Vimodihart Announcement We are excited to announce that we are making your provider's discharge notes available to you in "[employer]. You will see these notes when they are completed and signed by the physician that discharged you from your recent hospital stay. If you have any questions or concerns about any information you see in "[employer] please call the Health Information Department where you were seen or reach out to your Primary Care Provider for more information about your plan of care. Introducing Dwayne Melara As a [facility] patient, I wanted to make you aware of our electronic visit tool called Dwayne Melara. [facility] 24/7 allows you to connect within minutes with a medical provider 24 hours a day, seven days a week via a mobile device or tablet or logging into a secure website from your computer. You can access Dwayne Juan Pablojosefin from anywhere in the [place]. A virtual visit might be right for you when you have a simple condition and feel like you just dont want to get out of bed, or cant get away from work for an appointment, when your regular [facility] provider is not available (evenings, weekends or holidays), or when youre out of town and need minor care.   Electronic visits cost only $49 and if the Dwayne Melara provider determines a prescription is needed to treat your condition, one can be electronically transmitted to a nearby pharmacy*. Please take a moment to enroll today if you have not already done so. The enrollment process is free and takes just a few minutes. To enroll, please download the [employer] 24/7 gilma to your tablet or phone, or visit [email or web address] to enroll on your computer. And, as an [address] [place] [address] patient with a [employer] account, the results of your visits will be scanned into your electronic medical record and your primary care provider will be able to view the scanned results. We urge you to continue to see your regular [employer] provider for your ongoing medical care. And while your primary care provider may not be the one available when you seek a 8D Worldjosefin virtual visit, the peace of mind you get from getting a real diagnosis real time can be priceless. For more information on [facility], view our Frequently Asked Questions (FAQs) at [email or web address]. Sincerely, 
 
Nataliia Quiroz MD 
Chief Medical Officer [address] [place] [address] *:  certain medications cannot be prescribed via [facility] Providers Seen During Your Hospitalization Provider Specialty Primary office phone Ira Steele, [address] [facility] Orthopedic Surgery [phone] Your Primary Care Physician (PCP) Primary Care Physician Office Phone Office Fax [address] [place] [address] [phone] You are allergic to the following No active allergies Recent Documentation Height Weight Breastfeeding? BMI OB Status Smoking Status 1.626 m 83 kg No 31.41 kg/m2 Hysterectomy Former Smoker Emergency Contacts Name Discharge Info Relation Home Work Mobile Ravi Angeles DISCHARGE CAREGIVER [3] Spouse [3] [phone] Cindy Lewis N/A  AT THIS TIME [6] Parent [1] [phone] Patient Belongings The following personal items are in your possession at time of discharge: 
  Dental Appliances: None  Visual Aid: None      Home Medications: None   Jewelry: None  Clothing: None    Other Valuables: None  Personal Items Sent to Safe: n/a Discharge Instructions Attachments/References ASPIRIN (BY MOUTH) (ENGLISH) CELECOXIB (BY MOUTH) (ENGLISH) IRON SUPPLEMENTS (BY MOUTH) (ENGLISH) OXYCODONE/ACETAMINOPHEN (BY MOUTH) (ENGLISH) Patient Handouts Aspirin (By mouth) Aspirin (AS-pir-in) Treats pain, fever, and inflammation. May lower risk of heart attack and stroke. Brand Name(s): Ascriptin Regular Strength, Aspergum, Aspir Low, Aspirin Adult Low Dose, Aspirin Low Dose, Mragret Aspirin Children's, Margret Aspirin Regimen, Margret Extra Strength, Margret Genuine Aspirin, Margret Low Dose, Bufferin, Bufferin Low Dose, Durlaza, Ecotrin, Ecpirin There may be other brand names for this medicine. When This Medicine Should Not Be Used: This medicine is not right for everyone. Do not use it if you had an allergic reaction to aspirin or other NSAIDs, or if you have a history of asthma with nasal polyps and rhinitis. How to Use This Medicine:  
Delayed Release Capsule, Long Acting Capsule, Gum, Tablet, Chewable Tablet, Fizzy Tablet, Coated Tablet, Long Acting Tablet, 24 Hour Capsule · Your doctor will tell you how much medicine to use. Do not use more than directed. · It is best to take this medicine with food or milk. · Capsule, tablet, or coated tablet: Swallow whole. Do not crush, break, or chew it. · Chewable tablet: You may chew it completely or swallow it whole. · Gum: Chew completely to make sure you get as much medicine as possible. Drink a full glass (8 ounces) of water after chewing the gum. · Swallow the extended-release capsule whole. Do not crush, break, or chew it. Take the capsule with a full glass of water at the same time each day. · Follow the instructions on the medicine label if you are using this medicine without a prescription. · Missed dose: If you miss a dose of Durlaza, skip the missed dose and go back to your regular dosing schedule. Do not take extra medicine to make up for a missed dose. · Store the medicine in a closed container at room temperature, away from heat, moisture, and direct light. Drugs and Foods to Avoid: Ask your doctor or pharmacist before using any other medicine, including over-the-counter medicines, vitamins, and herbal products. · Some foods and medicines can affect how aspirin works. Tell your doctor if you are using any of the following: ¨ Dipyridamole, methotrexate, probenecid, sulfinpyrazone, ticlopidine ¨ Blood thinner (including clopidogrel, prasugrel, ticagrelor, warfarin) ¨ Blood pressure medicine ¨ Medicine to treat seizures (including phenytoin, valproic acid) ¨ NSAID pain or arthritis medicine (including celecoxib, diclofenac, ibuprofen, naproxen) ¨ Steroid medicine (including dexamethasone, hydrocortisone, methylprednisolone, prednisolone, prednisone) · Do not take Durlaza 2 hours before or 1 hour after you drink alcohol or take medicines that contain alcohol. Warnings While Using This Medicine: · Tell your doctor if you are pregnant or breastfeeding. Do not use this medicine during the later part of a pregnancy unless your doctor tells you to. · Tell your doctor if you have kidney disease, liver disease, high blood pressure, heart disease, or a history of stomach bleeding or ulcers. · This medicine may increase your risk for bleeding, including stomach ulcers. · Do not give aspirin to a child or teenager who has chickenpox or flu symptoms, unless the doctor says it is okay. Aspirin can cause a life-threatening reaction called Reye syndrome. · Tell any doctor or dentist who treats you that you are using this medicine. This medicine may affect certain medical test results. · Keep all medicine out of the reach of children. Never share your medicine with anyone. Possible Side Effects While Using This Medicine:  
Call your doctor right away if you notice any of these side effects: · Allergic reaction: Itching or hives, swelling in your face or hands, swelling or tingling in your mouth or throat, chest tightness, trouble breathing · Bloody or black stools, bloody vomit or vomit that looks like coffee grounds · Chest tightness, wheezing · Ringing in the ears · Severe stomach pain · Unusual bleeding, bruising, or weakness If you notice other side effects that you think are caused by this medicine, tell your doctor. Call your doctor for medical advice about side effects. You may report side effects to FDA at 9-141-FDA-7961 © 2017 [address] Information is for End User's use only and may not be sold, redistributed or otherwise used for commercial purposes. The above information is an  only. It is not intended as medical advice for individual conditions or treatments. Talk to your doctor, nurse or pharmacist before following any medical regimen to see if it is safe and effective for you. Celecoxib (By mouth) Celecoxib (kjv-c-XSH-ib) Treats pain. This medicine is an NSAID. Brand Name(s): CeleBREX, SmartRx CapXib Kit There may be other brand names for this medicine. When This Medicine Should Not Be Used: This medicine is not right for everyone. Do not use it if you had an allergic reaction (including asthma) to celecoxib, aspirin, NSAIDs, or a sulfa drug (such as sulfamethoxazole). Do not use this medicine right before or right after coronary artery bypass graft (CABG). How to Use This Medicine:  
Capsule · Your doctor will tell you how much medicine to use. Do not use more than directed. · It is best to take this medicine with food or milk so it does not upset your stomach. · Use this medicine for the shortest time possible and in the smallest dose possible. This will help lower the risk of side effects. · If you cannot swallow the capsule, you may open it and pour the medicine into a teaspoon of applesauce. Stir the mixture well and swallow right away. Drink enough water to make sure you swallow all of the medicine. · This medicine should come with a Medication Guide. Ask your pharmacist for a copy if you do not have one. · Missed dose: Take a dose as soon as you remember. If it is almost time for your next dose, wait until then and take a regular dose. Do not take extra medicine to make up for a missed dose. · Store the medicine in a closed container at room temperature, away from heat, moisture, and direct light. Any medicine that has been mixed with applesauce may be stored in a refrigerator and used within 6 hours. Drugs and Foods to Avoid: Ask your doctor or pharmacist before using any other medicine, including over-the-counter medicines, vitamins, and herbal products. · Some medicines and foods can affect how celecoxib works. Tell your doctor if you are taking any of the following: ¨ A blood thinner, such as warfarin ¨ A diuretic (water pill), such as furosemide, hydrochlorothiazide (HCTZ), torsemide ¨ Blood pressure medicine, such as enalapril, lisinopril, losartan, olmesartan, valsartan ¨ Fluconazole ¨ Lithium ¨ Other pain or arthritis medicine, such as aspirin, diclofenac, ibuprofen, naproxen ¨ Steroid medicine, such as hydrocortisone, methylprednisolone, prednisone · Do not drink alcohol while you are using this medicine. Warnings While Using This Medicine: · Tell your doctor if you are pregnant or breastfeeding. Do not use this medicine during the later part of pregnancy, unless your doctor tells you to.  
· Tell your doctor if you have a history of ulcers or other stomach problems, kidney or liver disease, anemia, aspirin-sensitive asthma, high blood pressure, congestive heart failure, or other heart or circulation problems. · This medicine may cause the following problems: ¨ A serious liver problem ¨ Bleeding in your stomach or intestines ¨ Increased risk for a heart attack or stroke ¨ Risk for disseminated intravascular coagulation (bleeding problem) in children younger than 18 years · Tell any doctor or dentist who treats you that you are using this medicine. · Your doctor will do lab tests at regular visits to check on the effects of this medicine. Keep all appointments. · Keep all medicine out of the reach of children. Never share your medicine with anyone. Possible Side Effects While Using This Medicine:  
Call your doctor right away if you notice any of these side effects: · Allergic reaction: Itching or hives, swelling in your face or hands, swelling or tingling in your mouth or throat, chest tightness, trouble breathing · Blistering, peeling, red skin rash · Bloody or black, tarry stools · Change in how much or how often you urinate, bloody or cloudy urine · Chest pain, shortness of breath, or coughing up blood · Fast or slow heartbeat · Dark urine or pale stools, nausea, vomiting, loss of appetite, stomach pain, yellow skin or eyes · Numbness or weakness in your arm or leg, or on one side of your body · Pain in your calf · Shortness of breath, cold sweat, and bluish skin · Sudden or severe headache, dizziness, or problems with vision, speech, or walking · Swelling in your hands, ankles, or feet, rapid weight gain · Unusual tiredness or weakness, pale skin · Vomiting blood or material that looks like coffee grounds If you notice these less serious side effects, talk with your doctor: · Mild skin rash · Muscle or joint pain If you notice other side effects that you think are caused by this medicine, tell your doctor. Call your doctor for medical advice about side effects.  You may report side effects to FDA at 5-732-FDA-9256 © 2017 [facility] Information is for End User's use only and may not be sold, redistributed or otherwise used for commercial purposes. The above information is an  only. It is not intended as medical advice for individual conditions or treatments. Talk to your doctor, nurse or pharmacist before following any medical regimen to see if it is safe and effective for you. Iron Supplements (By mouth) Treats low blood iron or anemia by helping your body make red blood cells. Brand Name(s): Beef/Iron/Wine, Bifera, BiferaRx, Corvite FE, Duofer, EZFE 200, Enfamil Cm-In-Sol, [facility] Iron Tablets, Fe-20, Femcon Fe, Femiron, Feosol, Cm-Iron, Itzel haute, [place] There may be other brand names for this medicine. When This Medicine Should Not Be Used: You should not use this medicine if you have had an allergic reaction to iron supplements, or if you have a condition called hemachromatosis (iron overload disease) or hemosiderosis (iron in the lungs), or any type of anemia that is not caused by iron deficiency. How to Use This Medicine:  
Liquid Filled Capsule, Coated Tablet, Tablet, Capsule, Chewable Tablet, Liquid, Long Acting Capsule, Long Acting Tablet · Your doctor will tell you how much of this medicine to take and how often. Do not take more medicine or take it more often than your doctor tells you to. Carefully follow your doctor's instructions about any special diet. · It is best to take this medicine on an empty stomach, 1 hour before or 2 hours after a meal. Take the medicine with a full glass or water or fruit juice. If the medicine upsets your stomach, you may take it with food. · The chewable tablet must be chewed or crushed before you swallow it. · Measure the oral liquid medicine with a marked measuring spoon or medicine cup. · The oral liquid may stain your teeth.  These stains can be prevented by mixing the medicine with water or other liquids (such as fruit juice, tomato juice), and drinking the medicine with a straw. To remove any iron stains, brush your teeth with baking soda or peroxide. If a dose is missed: · If you miss a dose or forget to take your medicine, take it as soon as you can. If it is almost time for your next dose, wait until then to take the medicine and skip the missed dose. · Do not use extra medicine to make up for a missed dose. How to Store and Dispose of This Medicine: · Store the medicine at room temperature, away from heat, moisture, and direct light. · Keep all medicine away from children, and never share your medicine with anyone. Drugs and Foods to Avoid: Ask your doctor or pharmacist before using any other medicine, including over-the-counter medicines, vitamins, and herbal products. · Do not take iron supplements by mouth if you are also receiving iron injections. · Make sure your doctor knows if you are also using phenytoin (Dilantin®), acetohydroxamic acid (Lithostat®), or antibiotics such as demeclocycline, doxycycline (Vibramycin®), Cipro®, Levaquin®, minocycline, moxifloxacin (Avelox®), Tequin®, or tetracycline. · Tell your doctor if you are using antacids (such as Maalox® or Mylanta®). · Avoid the following foods, or eat them in small amounts at least 1 hour before or 2 hours after taking your iron: eggs, milk, cheese, yogurt, tea or coffee, whole-grain cereals, and breads. Warnings While Using This Medicine: · Make sure your doctor knows if you are pregnant or breastfeeding, or if you have stomach or intestinal problems, an active infection, diabetes, porphyria, or other medical problems. · Make sure any doctor or dentist who treats you knows that you are using this medicine. Iron may affect the results of certain medical tests. · Iron can cause your stools to be darker in color. This is normal and is not a cause for concern. Possible Side Effects While Using This Medicine:  
Call your doctor right away if you notice any of these side effects: · Bloody diarrhea · Bluish-colored lips, hands, or fingernails · Chest pain · Fever · Pale or clammy skin · Severe or continuing stomach cramps, vomiting (with or without blood) · Shallow breathing, weakness, weak but fast heartbeat If you notice these less serious side effects, talk with your doctor: · Constipation, diarrhea, nausea · Dark-colored urine · Leg cramps If you notice other side effects that you think are caused by this medicine, tell your doctor. Call your doctor for medical advice about side effects. You may report side effects to FDA at 7-486-FDA-8960 © 2017 [address] Information is for End User's use only and may not be sold, redistributed or otherwise used for commercial purposes. The above information is an  only. It is not intended as medical advice for individual conditions or treatments. Talk to your doctor, nurse or pharmacist before following any medical regimen to see if it is safe and effective for you. Oxycodone/Acetaminophen (By mouth) Acetaminophen (o-zchy-c-MIN-oh-fen), Oxycodone Hydrochloride (bp-o-DZN-done lavonne-droe-KLOR-bridget) Treats moderate to moderately severe pain. This medicine is a narcotic pain reliever. Brand Name(s): Endocet, Percocet, Primlev, Xartemis XR There may be other brand names for this medicine. When This Medicine Should Not Be Used: This medicine is not right for everyone. Do not use it if you had an allergic reaction to acetaminophen or oxycodone, or if you have serious breathing problems or paralytic ileus. How to Use This Medicine:  
Capsule, Liquid, Tablet, Long Acting Tablet · Your doctor will tell you how much medicine to use. Do not use more than directed. · An overdose can be dangerous. Follow directions carefully so you do not get too much medicine at one time. · Oral liquid: Measure the oral liquid medicine with a marked measuring spoon, oral syringe, or medicine cup. · Swallow the extended-release tablet whole. Do not crush, break, or chew it. Do not lick or wet the tablet before placing it in your mouth. Do not give this medicine through a feeding tube. · This medicine should come with a Medication Guide. Ask your pharmacist for a copy if you do not have one. · Missed dose: If you miss a dose of this medicine, skip the missed dose and go back to your regular dosing schedule. Do not double doses. · Store the medicine in a closed container at room temperature, away from heat, moisture, and direct light. Ask your pharmacist about the best way to dispose of medicine you do not use. Drugs and Foods to Avoid: Ask your doctor or pharmacist before using any other medicine, including over-the-counter medicines, vitamins, and herbal products. · Do not use Xartemis XR if you are using or have used an MAO inhibitor in the past 14 days. · Some medicines can affect how this medicine works. Tell your doctor if you are using any of the following: ¨ Carbamazepine, erythromycin, ketoconazole, lamotrigine, mirtazapine, naltrexone, phenytoin, propranolol, rifampin, ritonavir, tramadol, trazodone, or zidovudine ¨ Birth control pills ¨ Diuretic (water pill) ¨ Medicine to treat depression ¨ Phenothiazine medicine ¨ Triptan medicine to treat migraine headaches · Do not drink alcohol while you are using this medicine. Acetaminophen can damage your liver, and alcohol can increase this risk. Do not take acetaminophen without asking your doctor if you have 3 or more drinks of alcohol every day. · Tell your doctor if you use anything else that makes you sleepy. Some examples are allergy medicine, narcotic pain medicine, and alcohol. Tell your doctor if you are using buprenorphine, butorphanol, nalbuphine, pentazocine, a benzodiazepine, or a muscle relaxer. Warnings While Using This Medicine: · Tell your doctor if you are pregnant or breastfeeding, or if you have kidney disease, liver disease, heart disease, low blood pressure, breathing problems or lung disease (such as asthma, COPD), thyroid problems, Kinney disease, pancreas or gallbladder problems, prostate problems, trouble urinating, or a stomach problems, or a history of head injury or brain damage, seizures, or alcohol or drug abuse. Tell your doctor if you are allergic to codeine. · This medicine may cause the following problems: 
¨ High risk of overdose, which can lead to death ¨ Respiratory depression (serious breathing problem that can be life-threatening) ¨ Liver problems ¨ Serious skin reactions ¨ Serotonin syndrome (when used with certain medicines) · This medicine may make you dizzy or drowsy. Do not drive or do anything that could be dangerous until you know how this medicine affects you. Sit or lie down if you feel dizzy. Stand up carefully. · This medicine contains acetaminophen. Read the labels of all other medicines you are using to see if they also contain acetaminophen, or ask your doctor or pharmacist. Gideon Gibbs not use more than 4 grams (4,000 milligrams) total of acetaminophen in one day. · This medicine can be habit-forming. Do not use more than your prescribed dose. Call your doctor if you think your medicine is not working. · Do not stop using this medicine suddenly. Your doctor will need to slowly decrease your dose before you stop it completely. · This medicine could cause infertility. Talk with your doctor before using this medicine if you plan to have children. · This medicine may cause constipation, especially with long-term use. Ask your doctor if you should use a laxative to prevent and treat constipation. · Keep all medicine out of the reach of children. Never share your medicine with anyone. Possible Side Effects While Using This Medicine: Call your doctor right away if you notice any of these side effects: · Allergic reaction: Itching or hives, swelling in your face or hands, swelling or tingling in your mouth or throat, chest tightness, trouble breathing · Anxiety, restlessness, fast heartbeat, fever, muscle spasms, twitching, diarrhea, seeing or hearing things that are not there · Blistering, peeling, red skin rash · Blue lips, fingernails, or skin · Dark urine or pale stools, loss of appetite, stomach pain, yellow skin or eyes · Extreme weakness, shallow breathing, uneven heartbeat, seizures, sweating, or cold or clammy skin · Severe confusion, lightheadedness, dizziness, or fainting · Severe constipation, nausea, or vomiting · Trouble breathing or slow breathing If you notice these less serious side effects, talk with your doctor:  
· Headache · Mild constipation, nausea, or vomiting · Mild sleepiness or drowsiness If you notice other side effects that you think are caused by this medicine, tell your doctor. Call your doctor for medical advice about side effects. You may report side effects to FDA at 7-940-FDA-1519 © 2017 [address] Information is for End User's use only and may not be sold, redistributed or otherwise used for commercial purposes. The above information is an  only. It is not intended as medical advice for individual conditions or treatments. Talk to your doctor, nurse or pharmacist before following any medical regimen to see if it is safe and effective for you. Please provide this summary of care documentation to your next provider. Signatures-by signing, you are acknowledging that this After Visit Summary has been reviewed with you and you have received a copy. Patient Signature:  ____________________________________________________________  Date:  ____________________________________________________________  
  
Merna Balderrama    
    
 Provider Signature:  ____________________________________________________________ Date:  ____________________________________________________________

## 2018-05-02 NOTE — PROGRESS NOTES
Rounded on patient post total knee replacement. Activity: Reinforced importance of getting OOB for all meals, going to bathroom to help prevent blood clots. VTE prophylaxis: Instructed patient to use their SCD's when not up and walking. To use while in bed and in the chair. Educated re: ankle pumps to assist with circulation when in hospital and at home. Medications: Reviewed pain medications patient is taking and the importance of keeping pain under control to help with getting OOB and therapy. Reminded the patient to always eat a snack with their pain medication to help to prevent nausea. Encouraged patient to monitor for constipation and to take a stool softner/laxative while recovering and on pain medication. Incentive Spirometry: Reinforced use of incentive spirometer with return demonstration by patient. Wound Care: Dressing to surgical site covered with ace wrap and ice machine. Patient instructed not to take dressing off at home. Patient given CHG wash to use in hospital and at home. Stressed importance of using a clean towel and washcloth daily. Reminded to put on clean clothes and night clothes daily. Ice Protocol: Ice man machine in place per protocol. Patient Safety: Call light in reach. Patient and spouse reminded to call for help toget OOB or when leaving bathroom for safety. Phone and other items also within reach per patient's request.     Diet: Educated patient on the importance of eating three well balanced meals a day with protein to promote bone/muscle healing. Reminded patient to drink lots of fluids to protect kidneys from all the medications being taken currently with recovery. Patient given educational material to remind them to continue doing everything at home to prevent complications and have a successful recovery. Patient and spouse verbalized understand of all information and education discussed. Patient and spouse given the opportunity for asking questions.

## 2018-05-02 NOTE — PROGRESS NOTES
Problem: Mobility Impaired (Adult and Pediatric)  Goal: *Acute Goals and Plan of Care (Insert Text)  Physical Therapy Goals  Initiated 5/2/2018 and to be accomplished within 7 day(s)  1. Patient will move from supine to sit and sit to supine , scoot up and down and roll side to side in bed with supervision/set-up. 2.  Patient will transfer from bed to chair and chair to bed with supervision/set-up using the least restrictive device. 3.  Patient will perform sit to stand with supervision/set-up. 4.  Patient will ambulate with supervision/set-up for >150 feet with the least restrictive device. 5.  Patient will ascend/descend 4 stairs with 1 handrail(s) with supervision/set-up. physical Therapy EVALUATION    Patient: Leena Van (89 y.o. female)  Date: 5/2/2018  Primary Diagnosis: Osteoarthritis of left knee, unspecified osteoarthritis type [M17.12]  Procedure(s) (LRB):  LEFT TOTAL KNEE ARTHROPLASTY (Left) Day of Surgery   Precautions: WBAT        ASSESSMENT :  Patient is 60 yo F admitted to hospital for TKA and presents today alert and agreeable to therapy. Patient was educated on weight bearing status, role of therapy, TE (see below), and equipment in room including role of SCDs, towel roll, and ice sleeve. Patient demonstrated intact SLR and transferred to sitting EOB for objective assessment. Patient was given demo with instruction on sit <> stand transfer and gait training. Patient transferred to standing with CG and ambulated 125ft RW with CG. At conclusion of session patient transferred to sitting in recliner and was left resting with call bell by the side, SCDs donned, and towel roll under ankle. Patient instructed to call for assistance if they needed to get up for any reason and denied need for further assistance. Patient demonstrates decreased strength, mobility, and endurance and will benefit from skilled intervention to address the above impairments.   Patients rehabilitation potential is considered to be Good  Factors which may influence rehabilitation potential include:   []         None noted  []         Mental ability/status  [x]         Medical condition  [x]         Home/family situation and support systems  [x]         Safety awareness  [x]         Pain tolerance/management  []         Other:      PLAN :  Recommendations and Planned Interventions:  [x]           Bed Mobility Training             [x]    Neuromuscular Re-Education  [x]           Transfer Training                   []    Orthotic/Prosthetic Training  [x]           Gait Training                          []    Modalities  [x]           Therapeutic Exercises          []    Edema Management/Control  [x]           Therapeutic Activities            [x]    Patient and Family Training/Education  []           Other (comment):    Frequency/Duration: Patient will be followed by physical therapy 1-2 times per day/4-7 days per week to address goals. Discharge Recommendations: Home Health  Further Equipment Recommendations for Discharge: transfer bench and rolling walker     G-CODES     Mobility  Current  CI= 1-19%   Goal  CI= 1-19%. The severity rating is based on the Level of Assistance required for Functional Mobility and ADLs.        G-CODES     Eval Complexity: History: MEDIUM  Complexity : 1-2 comorbidities / personal factors will impact the outcome/ POC Exam:LOW Complexity : 1-2 Standardized tests and measures addressing body structure, function, activity limitation and / or participation in recreation  Presentation: LOW Complexity : Stable, uncomplicated  Clinical Decision Making:Low Complexity   Overall Complexity:LOW     SUBJECTIVE:   Patient stated I want to do well.     OBJECTIVE DATA SUMMARY:     Past Medical History:   Diagnosis Date    Arthritis      Past Surgical History:   Procedure Laterality Date    HX COLONOSCOPY  2009    HX HEMORRHOIDECTOMY  2016    HX HYSTERECTOMY       Barriers to Learning/Limitations: yes;  altered mental status (lethargy)  Compensate with: Visual Cues, Verbal Cues and Tactile Cues  Prior Level of Function/Home Situation: Patient lives with spouse who will be home to assist; they reside in 1 story home with 4STE with HR and patient reports she was independent with mobility and I/ADL's PTA. Patient has elevated commode heights at home and a step over tub/shower. Home Situation  Home Environment: Apartment  # Steps to Enter: 0  One/Two Story Residence: Other (Comment)  # of Interior Steps: 0  Height of Each Step (in): 0 inches  Interior Rails: None  Lift Chair Available: No  Living Alone: No  Support Systems: Spouse/Significant Other/Partner  Patient Expects to be Discharged to[de-identified] Private residence  Current DME Used/Available at Home: None  Critical Behavior:    A&Ox4  Strength:    Strength: Generally decreased, functional (L knee flex/ext 3+/5, DF 5/5, RLE 5/5)   Tone & Sensation:   Tone: Normal (BLE)   Sensation: Intact (BLE to LT)   Range Of Motion:  AROM: Generally decreased, functional (L knee 0-105 degrees)   Functional Mobility:  Bed Mobility:   Supine to Sit: Stand-by assistance   Scooting: Stand-by assistance  Transfers:  Sit to Stand: Stand-by assistance  Stand to Sit: Stand-by assistance    Balance:   Sitting: Intact  Standing: Impaired; With support  Standing - Static: Good  Standing - Dynamic : Fair  Ambulation/Gait Training:  Distance (ft): 125 Feet (ft)  Assistive Device: Walker, rolling  Ambulation - Level of Assistance: Contact guard assistance   Speed/Patricia: Slow  Interventions: Verbal cues; Visual/Demos              Therapeutic Exercises:   Instructed patient in performing heel slides and quad sets (3sec hold) x10 on the hour while awake; performed x3 for teach back this session. Pain:  Pt reports 0/10 pain or discomfort prior to treatment.    Pt reports 0/10 pain or discomfort post treatment.      Activity Tolerance:   Patient tolerated activity well and was left resting with call bell by her side. Please refer to the flowsheet for vital signs taken during this treatment. After treatment:   [x]         Patient left in no apparent distress sitting up in chair  []         Patient left in no apparent distress in bed  [x]         Call bell left within reach  []         Nursing notified  [x]         Caregiver present  []         Bed alarm activated  [x]         SCDs in place to YOBANI MARCIAL     COMMUNICATION/EDUCATION:   [x]         Fall prevention education was provided and the patient/caregiver indicated understanding. [x]         Patient/family have participated as able in goal setting and plan of care. [x]         Patient/family agree to work toward stated goals and plan of care. []         Patient understands intent and goals of therapy, but is neutral about his/her participation. []         Patient is unable to participate in goal setting and plan of care.     Thank you for this referral.  Carlee Berkowitz, PT   Time Calculation: 32 mins

## 2018-05-02 NOTE — OP NOTES
[address] [place] [address]   OPERATIVE REPORT    Bibi Castillo.  MR#: [id number]  : 1955  ACCOUNT #: [de-identified]   DATE OF SERVICE: 2018    PREOPERATIVE DIAGNOSIS:  Severe end-stage arthritis of the left knee with severe valgus deformity with 25-30 degree valgus deformity in mid flexion and end-stage arthritis instability. POSTOPERATIVE DIAGNOSIS:  Severe end-stage arthritis of the left knee with severe valgus deformity with 25-30 degree valgus deformity in mid flexion and end-stage arthritis instability. PROCEDURES:    1. Difficult right total knee replacement using the [employer] system with the size 4 left posterior stabilized femoral component; size 3 tibia; size 3, 9 TS insert; and a 29 asymmetric patella. 2.  Correction of severe valgus deformity and rebalancing of knee. COMPLICATIONS:  None. SPECIMENS:  None. ESTIMATED BLOOD LOSS:  50 mL. IMPLANTS:  As above mentioned. SURGEON:  Kenia Fernandez MD    FIRST ASSISTANT:  Stephany Ornelas    SECOND ASSISTANT:  [facility]    ANESTHETIST:  Dr. Son Payan:  Preoperative femoral nerve block with light general.      DESCRIPTION OF PROCEDURE:  After anesthetic was successfully induced, antibiotics confirmed given, standard prep and drape and an appropriate timeout was performed. Modified gap balancing technique would be performed. Her bone quality was very, very soft and we had to be very careful with it. After the arthrotomy, the knee was stressed in mid flexion, indicating about a 25-30 degree valgus deformity. Interestingly enough, the lateral structures were surprisingly lax, atypical of a standard valgus knee. Initially, MCL release just to the mid coronal plane and modified gap balancing technique would be performed. The femoral canal was aspirated, lavaged, and reaspirated prior to instrumentation, cut for 5 degrees for the appropriate side. The crab claw was utilized to prevent undercutting. All cuts were checked for trueness and squareness and all soft tissue structures protected during the sawing process. An extra 2 was taken off the distal femur to help correct the fixed flexion deformity, and we switched our attention to the tibia, used the external alignment guide. The landmarks on the tibia were quite altered due to chronic partial subluxation of the tibia. We used the 3-foot standing x-ray to help confirm appropriate alignment and resected enough to get a decent cleanup cut. Again, the bone quality was very soft and we had to be very careful with the pin fixation. We then removed the posterior osteophytes while protecting neurovascular bundle and used the Aquamantys and Exparel cocktail. We then gap balanced the knee, thus correctly setting femoral rotation. We did do some mild ligamentous tweaking as well, which helped balance the knee perfectly. We then did our femoral finishing followed by placement of the trial components and set our tibial rotation, which was marked and later repunched, resurfaced the patella, restoring patellar thickness anatomically and using a rongeur to smooth out the edges. With all the trial components in place, we checked the overall alignment, range of motion, soft tissue balance, patellar tracking, stability, and alignment of the knee. A partial lateral release was also performed. I was very pleased with the result, punched the tibia as per the marks, and cemented in the knee, removing all extraneous cement and holding the knee in full extension. The cement was fully cured. Further cement removal, further trialing. I was happiest with the 9, locked it in place, again reducing the knee. Placed a deep drain, routine closure, fully flexed the knee prior to closure. At the end of the case, instrument, sponge, and needle count was correct. No complications. The patient tolerated the procedure well.   An excellent outcome to a difficult case, made worse by very soft bone, had to be very careful with the resections. Quite a deformed knee with 30 degree valgus deformity, significant bony deficits as well, necessitating advanced ligamentous balancing technique. Overall, an excellent outcome to the difficult case. Elizabeth Medrano, the number 1 assistant, was necessary for the case. He assisted in all phases starting with proper patient positioning, application of the tourniquet, the prep, the drape, the retractors, facilitating trial implant removal, and also stabilizing the leg throughout the procedure. He was also involved with additional hemostasis, involved with closure, and also postsurgical application of the dressing and drain and also transfer to the patient's bed. Overall, an excellent outcome to a difficult case.       Alicia Eid MD AM / Gama Mendoza  D: 05/02/2018 10:32     T: 05/02/2018 14:46  JOB #: [id number]

## 2018-05-03 ENCOUNTER — HOME HEALTH ADMISSION (OUTPATIENT)
Dept: HOME HEALTH SERVICES | Facility: HOME HEALTH | Age: 63
End: 2018-05-03
Payer: COMMERCIAL

## 2018-05-03 VITALS
HEART RATE: 82 BPM | WEIGHT: 183 LBS | BODY MASS INDEX: 31.24 KG/M2 | HEIGHT: 64 IN | DIASTOLIC BLOOD PRESSURE: 72 MMHG | TEMPERATURE: 98.6 F | RESPIRATION RATE: 18 BRPM | SYSTOLIC BLOOD PRESSURE: 114 MMHG | OXYGEN SATURATION: 96 %

## 2018-05-03 PROCEDURE — 97535 SELF CARE MNGMENT TRAINING: CPT

## 2018-05-03 PROCEDURE — 74011250636 HC RX REV CODE- 250/636: Performed by: ORTHOPAEDIC SURGERY

## 2018-05-03 PROCEDURE — 97165 OT EVAL LOW COMPLEX 30 MIN: CPT

## 2018-05-03 PROCEDURE — 77030012891

## 2018-05-03 PROCEDURE — 74011250637 HC RX REV CODE- 250/637: Performed by: ORTHOPAEDIC SURGERY

## 2018-05-03 PROCEDURE — 97116 GAIT TRAINING THERAPY: CPT

## 2018-05-03 RX ORDER — LANOLIN ALCOHOL/MO/W.PET/CERES
325 CREAM (GRAM) TOPICAL 2 TIMES DAILY WITH MEALS
Qty: 60 TAB | Refills: 2 | Status: SHIPPED | OUTPATIENT
Start: 2018-05-03

## 2018-05-03 RX ORDER — ASPIRIN 325 MG/1
325 TABLET, FILM COATED ORAL 2 TIMES DAILY
Qty: 60 TAB | Refills: 0 | Status: SHIPPED | OUTPATIENT
Start: 2018-05-03

## 2018-05-03 RX ORDER — CELECOXIB 200 MG/1
200 CAPSULE ORAL 2 TIMES DAILY
Qty: 60 CAP | Refills: 2 | Status: SHIPPED | OUTPATIENT
Start: 2018-05-03 | End: 2018-08-01

## 2018-05-03 RX ORDER — OXYCODONE AND ACETAMINOPHEN 10; 325 MG/1; MG/1
1-2 TABLET ORAL
Qty: 60 TAB | Refills: 0 | Status: SHIPPED | OUTPATIENT
Start: 2018-05-03

## 2018-05-03 RX ADMIN — PREGABALIN 50 MG: 50 CAPSULE ORAL at 09:02

## 2018-05-03 RX ADMIN — ASPIRIN 325 MG: 325 TABLET, FILM COATED ORAL at 09:02

## 2018-05-03 RX ADMIN — FERROUS SULFATE TAB 325 MG (65 MG ELEMENTAL FE) 325 MG: 325 (65 FE) TAB at 09:02

## 2018-05-03 RX ADMIN — CEFAZOLIN SODIUM 2 G: 2 SOLUTION INTRAVENOUS at 09:02

## 2018-05-03 RX ADMIN — CELECOXIB 200 MG: 100 CAPSULE ORAL at 09:02

## 2018-05-03 RX ADMIN — ACETAMINOPHEN 1000 MG: 500 TABLET, FILM COATED ORAL at 09:02

## 2018-05-03 RX ADMIN — DOCUSATE SODIUM 100 MG: 100 CAPSULE, LIQUID FILLED ORAL at 09:02

## 2018-05-03 RX ADMIN — ACETAMINOPHEN 1000 MG: 500 TABLET, FILM COATED ORAL at 14:06

## 2018-05-03 NOTE — PROGRESS NOTES

## 2018-05-03 NOTE — PROGRESS NOTES
Hemovac drain dc'd from left knee and 4x4 dressing applied over old drain site. Aquacel Ag dressing intact to knee incision with scant amount serosanguinous drainage noted. Ice pack reapplied to knee. Tolerated well by patient.

## 2018-05-03 NOTE — PROGRESS NOTES
2202- No nausea or vomiting. Patient voices no complaint of pain at this time. Ambulatory with walker and minimum assistance. Voiding without difficulty.

## 2018-05-03 NOTE — PROGRESS NOTES
Patient safe for home mobility and would recommend [facility] PT follow up with front wheeled walker and transfer bench. Full note to follow. Thank you for this referral. Baron Vela, PT, DPT.

## 2018-05-03 NOTE — PROGRESS NOTES
Problem: Mobility Impaired (Adult and Pediatric)  Goal: *Acute Goals and Plan of Care (Insert Text)  Physical Therapy Goals  Initiated 5/2/2018 and to be accomplished within 7 day(s)  1. Patient will move from supine to sit and sit to supine , scoot up and down and roll side to side in bed with supervision/set-up. 2.  Patient will transfer from bed to chair and chair to bed with supervision/set-up using the least restrictive device. 3.  Patient will perform sit to stand with supervision/set-up. 4.  Patient will ambulate with supervision/set-up for >150 feet with the least restrictive device. 5.  Patient will ascend/descend 4 stairs with 1 handrail(s) with supervision/set-up. Outcome: Resolved/Met Date Met: 05/03/18  physical Therapy TREATMENT/DISCHARGE    Patient: Alison Lewis (89 y.o. female)  Date: 5/3/2018  Diagnosis: Osteoarthritis of left knee, unspecified osteoarthritis type [M17.12] <principal problem not specified>  Procedure(s) (LRB):  LEFT TOTAL KNEE ARTHROPLASTY (Left) 1 Day Post-Op  Precautions: Fall  Chart, physical therapy assessment, plan of care and goals were reviewed. ASSESSMENT:  Patient presents today alert and agreeable to therapy and was reviewed weight bearing status, role of therapy, TE (see below), towel roll, and ice sleeve. Patient transferred to sitting EOB and transferred to standing with modified independent and ambulated total 200ft and transferred to sitting in wheelchair to be transported to practice steps. Patient then negotiated 8 steps with left HR at supervision level of assist. At conclusion of session patient transferred to supine in bed and was left resting with call bell by the side, SCDs donned, and towel roll under ankle. Patient instructed to call for assistance if they needed to get up for any reason and denied need for further assistance. Patient has met all goals at this time and denied any further questions.    Progression toward goals:  [x]      Goals met  []      Improving appropriately and progressing toward goals  []      Improving slowly and progressing toward goals  []      Not making progress toward goals and plan of care will be adjusted     PLAN:  Patient will be discharged from physical therapy at this time. Rationale for discharge:  [x] Goals Achieved  [] [address]  [] Patient not participating in therapy  [] Other:  Discharge Recommendations:  Home Health  Further Equipment Recommendations for Discharge:  transfer bench and rolling walker     G-CODES:   Mobility  Current  CI= 1-19%   Goal  CI= 1-19%  D/C  CI= 1-19%. The severity rating is based on the Level of Assistance required for Functional Mobility and ADLs. SUBJECTIVE:   Patient stated I feel pretty good.     OBJECTIVE DATA SUMMARY:   Critical Behavior:    A&Ox4  Functional Mobility Training:  Bed Mobility:  Supine to Sit: Modified independent  Sit to Supine: Modified independent  Scooting: Modified independent   Transfers:  Sit to Stand: Modified independent  Stand to Sit: Modified independent   Balance:  Sitting: Intact  Standing: Intact; With support  Ambulation/Gait Training:  Distance (ft): 200 Feet (ft)  Assistive Device: Walker, rolling  Ambulation - Level of Assistance: Contact guard assistance   Gait Abnormalities: Decreased step clearance   Left Side Weight Bearing: As tolerated   Speed/Patricia: Slow   Interventions: Verbal cues; Visual/Demos   Stairs:  Number of Stairs Trained: 8  Stairs - Level of Assistance: Supervision   Rail Use: Left   Pain:  Pt reports 0/10 pain or discomfort prior to treatment.    Pt reports 0/10 pain or discomfort post treatment. Activity Tolerance:   Patient tolerated activity well and was left resting with call bell by her side. Please refer to the flowsheet for vital signs taken during this treatment.   After treatment:   [] Patient left in no apparent distress sitting up in chair  [x] Patient left in no apparent distress in bed  [x] Call bell left within reach  [x] Nursing notified  [x] Caregiver present  [] Bed alarm activated  Lelo Dent, PT   Time Calculation: 26 mins

## 2018-05-03 NOTE — DISCHARGE INSTRUCTIONS
DISCHARGE SUMMARY from Nurse    The following personal items collected during your admission are returned to you:   Dental Appliance: Dental Appliances: None  Vision: Visual Aid: None  Hearing Aid:    Jewelry: Jewelry: None  Clothing: Clothing: None  Other Valuables: Other Valuables: None  Valuables sent to safe: Personal Items Sent to Safe: n/a            PATIENT INSTRUCTIONS:    After general anesthesia or intravenous sedation, for 24 hours or while taking prescription Narcotics:  · Limit your activities  · Do not drive and operate hazardous machinery  · Do not make important personal or business decisions  · Do  not drink alcoholic beverages  · If you have not urinated within 8 hours after discharge, please contact your surgeon on call. Report the following to your surgeon:  · Excessive pain, swelling, redness or odor of or around the surgical area  · Temperature over 100.5  · Nausea and vomiting lasting longer than 4 hours or if unable to take medications  · Any signs of decreased circulation or nerve impairment to extremity: change in color, persistent  numbness, tingling, coldness or increase pain  · Any questions      Follow-up Appointments   Procedures    FOLLOW UP VISIT Appointment in: Two Weeks     Standing Status:   Standing     Number of Occurrences:   1     Order Specific Question:   Appointment in     Answer: Two Weeks       What to do at Home:            *  Please give a list of your current medications to your Primary Care Provider. *  Please update this list whenever your medications are discontinued, doses are      changed, or new medications (including over-the-counter products) are added. *  Please carry medication information at all times in case of emergency situations.           These are general instructions for a healthy lifestyle:    No smoking/ No tobacco products/ Avoid exposure to second hand smoke    Surgeon General's Warning:  Quitting smoking now greatly reduces serious risk to your health. Obesity, smoking, and sedentary lifestyle greatly increases your risk for illness    A healthy diet, regular physical exercise & weight monitoring are important for maintaining a healthy lifestyle    You may be retaining fluid if you have a history of heart failure or if you experience any of the following symptoms:  Weight gain of 3 pounds or more overnight or 5 pounds in a week, increased swelling in our hands or feet or shortness of breath while lying flat in bed. Please call your doctor as soon as you notice any of these symptoms; do not wait until your next office visit. Recognize signs and symptoms of STROKE:    F-face looks uneven    A-arms unable to move or move unevenly    S-speech slurred or non-existent    T-time-call 911 as soon as signs and symptoms begin-DO NOT go       Back to bed or wait to see if you get better-TIME IS BRAIN. The discharge information has been reviewed with the patient. The patient verbalized understanding. Apt[employer]ushart Activation    Thank you for requesting access to [employer]. Please follow the instructions below to securely access and download your online medical record. [employer] allows you to send messages to your doctor, view your test results, renew your prescriptions, schedule appointments, and more. How Do I Sign Up? 1. In your internet browser, go to [email or web address]. 2. Click on the First Time User? Click Here link in the Sign In box. You will see the New Member Sign Up page. 3. Enter your [employer] Access Code exactly as it appears below. You will not need to use this code after youve completed the sign-up process. If you do not sign up before the expiration date, you must request a new code. [employer] Access Code: [email or web address]  Expires: 2012  9:42 AM (This is the date your [facility] access code will )    4.  Enter the last four digits of your Social Security Number (xxxx) and Date of Birth (mm/dd/yyyy) as indicated and click Submit. You will be taken to the next sign-up page. 5. Create a [employer] ID. This will be your [employer] login ID and cannot be changed, so think of one that is secure and easy to remember. 6. Create a [employer] password. You can change your password at any time. 7. Enter your Password Reset Question and Answer. This can be used at a later time if you forget your password. 8. Enter your e-mail address. You will receive e-mail notification when new information is available in [address] 9. Click Sign Up. You can now view and download portions of your medical record. 10. Click the Download Summary menu link to download a portable copy of your medical information. Additional Information    If you have questions, please visit the Frequently Asked Questions section of the [employer] website at [email or web address] Remember, [employer] is NOT to be used for urgent needs. For medical emergencies, dial 911. Armband removed and shredded    Discharge Instructions for Total Knee Replacement Patients    · The dressing on your knee will be changed by the Home Health professional at the appropriate time. Keep your incision clean and dry. Do not apply any ointments to the incision. · You may shower as long as you keep you incision dry. When showering, leave your dressing on. The dressing is waterproof as long as the edges are sealed. · Notify your surgeon if:  · Your temperature is greater than 100.5  · You have pain not controlled by your pain medication  · You have increased drainage from your incision  · You have increased redness or swelling in your leg  · You have chest pain, shortness of breath, or any other problems    · Do your exercises as instructed by the home physical therapist.    · Bend and straighten your operative leg every hour. Walk once an hour during normal walking hours.     · During periods of inactivity or rest, your leg should be elevated with a rolled towel or folded pillow under the heel to keep the knee straight. · Do not place anything under the knee. · Do not sit in a recliner chair with the footrest elevated. · You may use ice to your knee for 20-30 minutes after exercise and as needed. Do not apply the ice pack directly to your skin. Use a barrier such as your pant leg or a thin towel. · If you have PIOTR hose (the white support stockings), remove them at bedtime and re-apply the hose in the morning for the next 2 weeks. Best of luck with your new knee and [address] YOU for choosing the [address]!

## 2018-05-03 NOTE — HOME CARE
[facility] received referral for SN/PT - Mitch Knee Protocol - patient discharged to home. This nurse verified address and contact numbers - patient lives with spouse, Karol Pollock. Per patient, she has a front wheeled walker and raised toilet seat. Referral called to central intake for completion and visit scheduling - STACEY Ruiz LPN

## 2018-05-03 NOTE — DISCHARGE SUMMARY
5/2/2018  6:21 AM    5/3/2018, 8:26 AM    Primary Dx:left Orthopedic / Rheumatologic: Total Knee Replacement  Secondary Dx: Etiological Diagnoses: none    HPI:  Pt has end stage OA and had failed conservative treatment. Due to the current findings and affected activity of daily living surgical intervention is indicated.   The alternatives, risks, complications as well as expected outcome were discussed, the patient understands and wishes to proceed with surgery    Past Medical History:   Diagnosis Date    Arthritis          Current Facility-Administered Medications:     0.9% sodium chloride infusion, 100 mL/hr, IntraVENous, CONTINUOUS, Adan Amaya MD, Last Rate: 100 mL/hr at 05/02/18 1217, 100 mL/hr at 05/02/18 1217    sodium chloride (NS) flush 5-10 mL, 5-10 mL, IntraVENous, Q8H, Adan Amaya MD, Stopped at 05/03/18 0600    sodium chloride (NS) flush 5-10 mL, 5-10 mL, IntraVENous, PRN, Adan Amaya MD    naloxone [facility]) injection 0.4 mg, 0.4 mg, IntraVENous, PRN, Adan Amaya MD    ferrous sulfate tablet 325 mg, 1 Tab, Oral, BID WITH MEALS, Adan Amaya MD, 325 mg at 05/02/18 1857    diphenhydrAMINE (BENADRYL) injection 12.5 mg, 12.5 mg, IntraVENous, Q6H PRN, Adan Amaya MD    zolpidem (AMBIEN) tablet 5 mg, 5 mg, Oral, QHS PRN, Adan Amaya MD, 5 mg at 05/02/18 2130    acetaminophen (TYLENOL) tablet 1,000 mg, 1,000 mg, Oral, QID, Adan Amaya MD, 1,000 mg at 05/02/18 2125    oxyCODONE IR (ROXICODONE) tablet 5-20 mg, 5-20 mg, Oral, Q3H PRN, Adan Amaya MD, 10 mg at 05/02/18 1310    ceFAZolin (ANCEF) 2g IVPB in 50 mL D5W, 2 g, IntraVENous, Q8H, Adan Amaya MD, Last Rate: 100 mL/hr at 05/02/18 2357, 2 g at 05/02/18 2357    ondansetron (ZOFRAN) injection 4 mg, 4 mg, IntraVENous, Q4H PRN, Adan Amaya MD    docusate sodium (COLACE) capsule 100 mg, 100 mg, Oral, BID, Adan Amaya MD, 100 mg at 05/02/18 1858    celecoxib (CELEBREX) capsule 200 mg, 200 mg, Oral, BID, Rachel Malik MD, 200 mg at 05/02/18 1858    pregabalin (LYRICA) capsule 50 mg, 50 mg, Oral, BID, Rachel Malik MD, 50 mg at 05/02/18 1858    buffered aspirin (BUFFERIN) tablet 325 mg, 325 mg, Oral, BID, Rachel Malik MD      Review of patient's allergies indicates no known allergies. Physical Exam:  General A&O x3 NAD, well developed, well nourished, normal affect  Heart: S1-S2, RRR  Lungs: CTA Bilat  Abd: soft NT, ND  Ext: n/v intact    Hospital Course:    Pt. Had leftOrthopedic / Rheumatologic: Total Knee Replacement    Post -op Course: The patient tolerated the procedure well. They were followed by internal medicine for help with medical management. Pt. Was place on Abx pre and post-op for prophylaxis against infection as well as coumadin pre and post-op for prophylaxis against DVT. Vitals signs remained stable, remained af. The wound wasclean, dry, no drainage. Pain was well controlled. Pt. Had negative calf tenderness or swelling, no evidence for DVT. Patient had PT/OT consult for evaluation and treatment. CBC  Lab Results   Component Value Date/Time    WBC 7.8 04/18/2018 07:35 AM    RBC 4.99 04/18/2018 07:35 AM    HCT 42.8 04/18/2018 07:35 AM    MCV 85.8 04/18/2018 07:35 AM    MCH 29.1 04/18/2018 07:35 AM    MCHC 33.9 04/18/2018 07:35 AM    RDW 12.3 04/18/2018 07:35 AM     Coagulation  Lab Results   Component Value Date    INR 1.0 04/18/2018    APTT 33.2 04/18/2018      Basic Metabolic Profile  Lab Results   Component Value Date     04/18/2018    CO2 28 04/18/2018    BUN 12 04/18/2018       Discharge Plan:  The patient will be d/c'd to home, total knee protocol, WBAT. She will have [facility] PT and nursing. Total joint protocol. Pt safe for homebound transfer, sp Total joint replacement. A walker, bedside commode, and shower chair will be utilized for ADL's. Follow up with Dr. Sue Garrido in 10-12 days. Call with any questions or concerns.

## 2018-05-03 NOTE — PROGRESS NOTES
Pt has met OT goals for safe discharge home with family. Full OT assessment to follow.      Julissa Wong OTR\L

## 2018-05-03 NOTE — PROGRESS NOTES
Mobility Intervention:       [] Pt dangled at edge of bed    [] Pt assisted OOB to bedside commode    [] Pt assisted OOB to chair    [x] Pt ambulated to bathroom    [x] Patient was ambulated in room/hallway    Assistive Device Utilized:       [x] Rolling walker   [] Crutches   [] Straight Cane   [] Knee immobilizer   [] IV pole    After Mobilization:     [] Patient left in no apparent distress sitting up in chair  [x] Patient left in no apparent distress in bed  [x] Call bell left within reach  [x] SCDs on & machine turned on  [x] Ice applied  [] RN notified  [] Caregiver present  [] Bed alarm activated    Reason patient not mobilized:      [] Patient refused   [] Nausea/vomiting   [] Low blood pressure   [] Drowsy/lethargic    Pain Rating:     [x] 0  [] 1  Assistive Device:        [] 2  [] 3  [] 4  [x] 5  [] 6  Assistive Device:        [] 7  [] 8  [] 9  [] 10    Comments:

## 2018-05-03 NOTE — PROGRESS NOTES
Rounded on patient post total knee replacement. Activity: Reinforced importance of getting OOB for all meals, going to bathroom to help prevent blood clots. VTE prophylaxis: Instructed patient to use their SCD's when not up and walking. To use while in bed and in the chair. Educated re: ankle pumps to assist with circulation when in hospital and at home. Medications: Reviewed pain medications patient is taking and the importance of keeping pain under control to help with getting OOB and therapy. Reminded the patient to always eat a snack with their pain medication to help to prevent nausea. Encouraged patient to monitor for constipation and to take a stool softner/laxative while recovering and on pain medication. Incentive Spirometry: Reinforced use of incentive spirometer with return demonstration by patient. Wound Care: Dressing to surgical site covered by dry ace wrap . Patient instructed not to take dressing off at home. Patient given CHG wash to use in hospital and at home. Stressed importance of using a clean towel and washcloth daily. Reminded to put on clean clothes and night clothes daily. Ice Protocol: Ice man machine in place per protocol. Patient Safety: Call light in reach. Patient and spouse reminded to call for help toget OOB or when leaving bathroom for safety. Phone and other items also within reach per patient's request.     Diet: Educated patient on the importance of eating three well balanced meals a day with protein to promote bone/muscle healing. Reminded patient to drink lots of fluids to protect kidneys from all the medications being taken currently with recovery. Patient given educational material to remind them to continue doing everything at home to prevent complications and have a successful recovery. Patient and spouse verbalized understand of all information and education discussed. Patient and spouse given the opportunity for asking questions.

## 2018-05-03 NOTE — CONSULTS
[facility]. Pulmonary, Critical Care, and Sleep Medicine    Initial Patient Consult    Name: Bev Mendez MRN: [id number]   : 1955 Hospital: [address] [place] [address] Dr   Date: 5/3/2018        IMPRESSION:   · Postop left total knee replacement- post op day #1. Doing well with pain control and no postop problems  · Osteoarthritis       Patient Active Problem List   Diagnosis Code    Arthritis of knee M17.10        RECOMMENDATIONS:   · Continue Post op progression of care  · Will monitor BP, renal function, H/H  · Monitor I and O.   · Fluids- 24 hrs and progress to incremental PO intake  · Bronchial hygiene protocol- ICS  · Antibiotics- SCIP  · Bowel prep  · Pain control per ortho  · OT, PT, OOB and ambulate  · Will Follow for medical management  · DVT prophylaxis  · Discussed with patient. Subjective: This patient has been seen and evaluated at the request of Dr. Megan Rodarte for postop medical management. Patient is a 59 y.o. female who is generally in good health. She has end stage osteoarthritis and underwent Left total knee replacement under general anesthesia with EBL 50 ml. Seen postop. No c/o SOB, Cough, chest pain  Denies nausea, vomiting  Denies dizziness, palpitations  Denies any rashes, itching. No other complaints offered. Reviewed medical records and available data. Past Medical History:   Diagnosis Date    Arthritis       Past Surgical History:   Procedure Laterality Date    HX COLONOSCOPY      HX HEMORRHOIDECTOMY  2016    HX HYSTERECTOMY        Prior to Admission medications    Medication Sig Start Date End Date Taking? Authorizing Provider   buffered aspirin (BUFFERIN) 325 mg tablet Take 1 Tab by mouth two (2) times a day. 5/3/18  Yes Cassie Smith PA-C   celecoxib (CELEBREX) 200 mg capsule Take 1 Cap by mouth two (2) times a day for 90 days.  5/3/18 8/1/18 Yes Cassie Smith PA-C   ferrous sulfate 325 mg (65 mg iron) tablet Take 1 Tab by mouth two (2) times daily (with meals). 5/3/18  Yes Cynthia Mckenzie PA-C   oxyCODONE-acetaminophen (PERCOCET)  mg per tablet Take 1-2 Tabs by mouth every four (4) hours as needed for Pain. Max Daily Amount: 12 Tabs. 5/3/18  Yes Cynthia Mckenzie PA-C   VITAMIN D2 50,000 unit capsule Take 50,000 Units by mouth every Monday, Wednesday, Friday. 18  Yes Historical Provider     No Known Allergies   Social History   Substance Use Topics    Smoking status: Former Smoker    Smokeless tobacco: Never Used    Alcohol use No      History reviewed. No pertinent family history. Current Facility-Administered Medications   Medication Dose Route Frequency    0.9% sodium chloride infusion  100 mL/hr IntraVENous CONTINUOUS    sodium chloride (NS) flush 5-10 mL  5-10 mL IntraVENous Q8H    ferrous sulfate tablet 325 mg  1 Tab Oral BID WITH MEALS    acetaminophen (TYLENOL) tablet 1,000 mg  1,000 mg Oral QID    docusate sodium (COLACE) capsule 100 mg  100 mg Oral BID    celecoxib (CELEBREX) capsule 200 mg  200 mg Oral BID    pregabalin (LYRICA) capsule 50 mg  50 mg Oral BID    buffered aspirin (BUFFERIN) tablet 325 mg  325 mg Oral BID       Review of Systems:  Pertinent items are noted in HPI. ROS    Objective:   Vital Signs:    Visit Vitals    /62 (BP 1 Location: Left arm, BP Patient Position: At rest)    Pulse 85    Temp 98.6 °F (37 °C)    Resp 18    Ht 5' 4\" (1.626 m)    Wt 83 kg (183 lb)    SpO2 95%    Breastfeeding No    BMI 31.41 kg/m2       O2 Device: Room air   O2 Flow Rate (L/min): 6 l/min   Temp (24hrs), Av °F (36.7 °C), Min:96.4 °F (35.8 °C), Max:98.9 °F (37.2 °C)       Intake/Output:   Last shift:         Last 3 shifts:  1901 -  0700  In: 2218.3 [P.O.:330;  I.V.:1888.3]  Out: 1910 [Urine:1700; Drains:210]    Intake/Output Summary (Last 24 hours) at 18 1017  Last data filed at 18 0557   Gross per 24 hour   Intake          1218.33 ml   Output             1910 ml   Net -691.67 ml        Physical Exam:   General:  Alert, cooperative, no distress, appears stated age. Head:  Normocephalic, without obvious abnormality, atraumatic. Eyes:  Conjunctivae/corneas clear. ANicteric   Nose: Nares normal. Mucosa normal. No drainage or sinus tenderness. Throat: Lips, mucosa dry. NO thrush;    Neck: Supple, symmetrical, trachea midline, no adenopathy, thyroid: no enlargment/tenderness/nodules, no carotid bruit and no JVD. No crepitus   Back:   Symmetric, no curvature, no spine tenderness or flank pain   Lungs:   Bilateral auscultation - clear to auscultaion   Chest wall:  No tenderness or deformity. Heart:  Regular rate and rhythm, S1, S2 normal, no murmur, click, rub or gallop. Abdomen:   Soft, non-tender. Bowel sounds normal. No masses,  No organomegaly. No paradox   Extremities: normal, atraumatic, no cyanosis or edema. Surgical site- intact   Pulses: 1-2+ and symmetric all extremities. Skin: Skin color, texture, turgor normal. No rashes or lesions   Lymph nodes: Cervical, supraclavicular, and axillary nodes normal.   Neurologic: Grossly nonfocal          Data review:   Labs:  No results found for this or any previous visit (from the past 24 hour(s)). PFT Results  (Last 48 hours)    None        Echo Results  (Last 48 hours)    None        Imaging:  I have personally reviewed the patients radiographs and have reviewed the reports:  CXR Results  (Last 48 hours)    None        CT Results  (Last 48 hours)    None            Moderate complexity decision making was performed during the evaluation of this patient at high risk for decompensation with multiple organ involvement     Above mentioned total time spent on reviewing the case/medical record/data/notes/EMR/patient examination/documentation/coordinating care with nurse/consultants, exclusive of procedures with complex decision making performed and > 50% time spent in face to face evaluation.      Brigido Wilson MD

## 2018-05-03 NOTE — PROGRESS NOTES
Problem: Self Care Deficits Care Plan (Adult)  Goal: *Acute Goals and Plan of Care (Insert Text)  Outcome: Resolved/Met Date Met: 05/03/18  Occupational Therapy EVALUATION/discharge    Patient: Divina Andrade (22 y.o. female)  Date: 5/3/2018  Primary Diagnosis: Osteoarthritis of left knee, unspecified osteoarthritis type [M17.12]  Procedure(s) (LRB):  LEFT TOTAL KNEE ARTHROPLASTY (Left) 1 Day Post-Op   Precautions:   WBAT    ASSESSMENT AND RECOMMENDATIONS:  DARSHAN bernard'robert pt for OT session. OT eval completed POD #1 s/p L  TKA. Educated in adaptive tech for increased safety and independence in ADL and related transfer. Following instruction pt completed ADL and related transfer at supervision level. Pt verbalized understanding of home safety recommendations and shower transfer tech. Pt to have assist of family PRN at discharge.  present for CG training. Skilled occupational therapy is not indicated at this time. Discharge Recommendations: None  Further Equipment Recommendations for Discharge: N/A      Barriers to Learning/Limitations: None  Compensate with: visual, verbal, tactile, kinesthetic cues/model     COMPLEXITY     Eval Complexity: History: LOW Complexity : Brief history review ; Examination: LOW Complexity : 1-3 performance deficits relating to physical, cognitive , or psychosocial skils that result in activity limitations and / or participation restrictions ; Decision Making:LOW Complexity : No comorbidities that affect functional and no verbal or physical assistance needed to complete eval tasks  Assessment: low Complexity        G-CODES:     Self Care  Current  CI= 1-19%   Goal  CI= 1-19%   D/C  CI= 1-19%. The severity rating is based on the Level of Assistance required for Functional Mobility and ADLs. SUBJECTIVE:   Patient stated i have a walker at home.     OBJECTIVE DATA SUMMARY:     Past Medical History:   Diagnosis Date    Arthritis      Past Surgical History: Procedure Laterality Date    HX COLONOSCOPY  2009    HX HEMORRHOIDECTOMY  2016    HX HYSTERECTOMY       Prior Level of Function/Home Situation: indepdent  Home Situation  Home Environment: Apartment  # Steps to Enter: 0  One/Two Story Residence: Other (Comment)  # of Interior Steps: 0  Height of Each Step (in): 0 inches  Interior Rails: None  Lift Chair Available: No  Living Alone: No  Support Systems: Spouse/Significant Other/Partner  Patient Expects to be Discharged to[de-identified] Private residence  Current DME Used/Available at Home: None  Tub or Shower Type: Tub/Shower combination  []     Right hand dominant   []     Left hand dominant  Cognitive/Behavioral Status:  Neurologic State: Alert             Skin:    No noted concern     Edema:    No noted concern     Vision/Perceptual:            No noted concern                          Coordination:  Coordination: Within functional limits (BUE)            Balance:  Sitting: Intact  Standing: Intact; With support    Strength:    Strength: Within functional limits (BUE)                Tone & Sensation:    Tone: Normal (BUE)  Sensation: Intact (BUE)                      Range of Motion:    AROM: Within functional limits (BUE)                         Functional Mobility and Transfers for ADLs:  Bed Mobility:     Supine to Sit: Modified independent  Sit to Supine: Modified independent  Scooting: Modified independent  Transfers:  Sit to Stand: Modified independent                Toilet Transfer : Modified independent                ADL Assessment:  Feeding: Independent    Oral Facial Hygiene/Grooming: Setup    Bathing: Supervision    Upper Body Dressing: Modified independent    Lower Body Dressing: Supervision    Toileting: Supervision               Pain:  Pt reports 0/10 pain or discomfort prior to treatment.    Pt reports 0/10 pain or discomfort post treatment. Activity Tolerance:   good    Please refer to the flowsheet for vital signs taken during this treatment.   After treatment:   [x]  Patient left in no apparent distress sitting up in chair  []  Patient left in no apparent distress in bed  [x]  Call bell left within reach  []  Nursing notified  [x]  Caregiver present  []  Bed alarm activated    COMMUNICATION/EDUCATION:   Communication/Collaboration:  [x]      Home safety education was provided and the patient/caregiver indicated understanding. []      Patient/family have participated as able and agree with findings and recommendations. []      Patient is unable to participate in plan of care at this time.     Carlos Enrique Haro OT  Time Calculation: 30 mins

## 2018-05-03 NOTE — PROGRESS NOTES
ROSANA NOTE: ROSANA acknowledges the  consult for \"discharge planning. \"     Reason for Admission:  Osteoarthritis of left knee                    RRAT Score:   5                  Plan for utilizing home health:     Pt has signed [facility] for [employer]. SW made the referral to [employer]                     Likelihood of Readmission: Low/green                          Transition of Care Plan:    Plan is for the pt to go home with [facility]. Pt's  will provide d/c transportation. Pt reported she has a walker and raised toilet seat. Pt reported she did not have a shower chair. PCP is Neli Muñoz MD seen a couple weeks prior to surgery. Care Management Interventions  PCP Verified by CM:  Yes  Mode of Transport at Discharge: BLS  Transition of Care Consult (CM Consult): [address]: Yes  Discharge Durable Medical Equipment: Yes  Physical Therapy Consult: Yes  Occupational Therapy Consult: Yes  Speech Therapy Consult: No  Current Support Network: Lives with Spouse  Confirm Follow Up Transport: Family  Freedom of Choice Offered: Yes  Discharge Location  Discharge Placement: Home with home health    FELICITAS Alonzo LS  [phone] (pager)

## 2018-05-03 NOTE — PROGRESS NOTES
Ortho    Pt. Seen and evaluated. Doing well, pain well controlled, progressing well with PT  Denies cp, sob, abd pain    Blood pressure 109/62, pulse 85, temperature 98.6 °F (37 °C), resp. rate 18, height 5' 4\" (1.626 m), weight 183 lb (83 kg), SpO2 95 %, not currently breastfeeding. leftKnee woundclean, dry, no drainage  Sensory intact to LT  Motor intact  nv intact  Neg calf tenderness    Labs:  CBC  @  CBC:   Lab Results   Component Value Date/Time    WBC 7.8 04/18/2018 07:35 AM    RBC 4.99 04/18/2018 07:35 AM    HGB 14.5 04/18/2018 07:35 AM    HCT 42.8 04/18/2018 07:35 AM    PLATELET 321 52/28/0601 07:35 AM     BMP:   Lab Results   Component Value Date/Time    Glucose 113 (H) 04/18/2018 07:35 AM    Sodium 138 04/18/2018 07:35 AM    Potassium 4.2 04/18/2018 07:35 AM    Chloride 103 04/18/2018 07:35 AM    CO2 28 04/18/2018 07:35 AM    BUN 12 04/18/2018 07:35 AM    Creatinine 0.56 (L) 04/18/2018 07:35 AM    Calcium 9.8 04/18/2018 07:35 AM   @  Coagulation  Lab Results   Component Value Date    INR 1.0 04/18/2018    APTT 33.2 04/18/2018      Basic Metabolic Profile  Lab Results   Component Value Date     04/18/2018    CO2 28 04/18/2018    BUN 12 04/18/2018       Assesment: leftOrthopedic / Rheumatologic: Total Knee Replacement  Past Medical History:   Diagnosis Date    Arthritis      ASA: 2    Status post joint replacement pt with risk of bleeding, blood clots, and infection.     Plan: aspirin, PT,home today

## 2018-05-04 ENCOUNTER — HOME CARE VISIT (OUTPATIENT)
Dept: SCHEDULING | Facility: HOME HEALTH | Age: 63
End: 2018-05-04
Payer: COMMERCIAL

## 2018-05-04 VITALS
OXYGEN SATURATION: 98 % | TEMPERATURE: 97 F | SYSTOLIC BLOOD PRESSURE: 120 MMHG | DIASTOLIC BLOOD PRESSURE: 76 MMHG | RESPIRATION RATE: 16 BRPM | HEART RATE: 74 BPM

## 2018-05-04 VITALS
RESPIRATION RATE: 17 BRPM | TEMPERATURE: 98.2 F | HEART RATE: 93 BPM | DIASTOLIC BLOOD PRESSURE: 80 MMHG | SYSTOLIC BLOOD PRESSURE: 120 MMHG

## 2018-05-04 PROCEDURE — 400013 HH SOC

## 2018-05-04 PROCEDURE — G0151 HHCP-SERV OF PT,EA 15 MIN: HCPCS

## 2018-05-04 PROCEDURE — G0299 HHS/HOSPICE OF RN EA 15 MIN: HCPCS

## 2018-05-05 ENCOUNTER — HOME CARE VISIT (OUTPATIENT)
Dept: SCHEDULING | Facility: HOME HEALTH | Age: 63
End: 2018-05-05
Payer: COMMERCIAL

## 2018-05-06 ENCOUNTER — HOME CARE VISIT (OUTPATIENT)
Dept: SCHEDULING | Facility: HOME HEALTH | Age: 63
End: 2018-05-06
Payer: COMMERCIAL

## 2018-05-06 VITALS
HEART RATE: 78 BPM | DIASTOLIC BLOOD PRESSURE: 68 MMHG | TEMPERATURE: 98.2 F | OXYGEN SATURATION: 97 % | SYSTOLIC BLOOD PRESSURE: 112 MMHG

## 2018-05-06 PROCEDURE — G0157 HHC PT ASSISTANT EA 15: HCPCS

## 2018-05-07 ENCOUNTER — HOME CARE VISIT (OUTPATIENT)
Dept: HOME HEALTH SERVICES | Facility: HOME HEALTH | Age: 63
End: 2018-05-07
Payer: COMMERCIAL

## 2018-05-07 VITALS
TEMPERATURE: 98.2 F | SYSTOLIC BLOOD PRESSURE: 130 MMHG | OXYGEN SATURATION: 97 % | DIASTOLIC BLOOD PRESSURE: 72 MMHG | HEART RATE: 90 BPM

## 2018-05-07 VITALS
HEART RATE: 94 BPM | DIASTOLIC BLOOD PRESSURE: 82 MMHG | SYSTOLIC BLOOD PRESSURE: 110 MMHG | TEMPERATURE: 99 F | OXYGEN SATURATION: 98 %

## 2018-05-07 PROCEDURE — G0157 HHC PT ASSISTANT EA 15: HCPCS

## 2018-05-08 ENCOUNTER — HOME CARE VISIT (OUTPATIENT)
Dept: SCHEDULING | Facility: HOME HEALTH | Age: 63
End: 2018-05-08
Payer: COMMERCIAL

## 2018-05-08 PROCEDURE — G0157 HHC PT ASSISTANT EA 15: HCPCS

## 2018-05-09 ENCOUNTER — HOME CARE VISIT (OUTPATIENT)
Dept: SCHEDULING | Facility: HOME HEALTH | Age: 63
End: 2018-05-09
Payer: COMMERCIAL

## 2018-05-09 VITALS
TEMPERATURE: 97.8 F | OXYGEN SATURATION: 98 % | SYSTOLIC BLOOD PRESSURE: 122 MMHG | HEART RATE: 99 BPM | DIASTOLIC BLOOD PRESSURE: 68 MMHG

## 2018-05-09 VITALS
RESPIRATION RATE: 16 BRPM | TEMPERATURE: 98.2 F | SYSTOLIC BLOOD PRESSURE: 122 MMHG | OXYGEN SATURATION: 97 % | HEART RATE: 86 BPM | DIASTOLIC BLOOD PRESSURE: 77 MMHG

## 2018-05-09 VITALS
SYSTOLIC BLOOD PRESSURE: 105 MMHG | TEMPERATURE: 98.2 F | OXYGEN SATURATION: 97 % | DIASTOLIC BLOOD PRESSURE: 60 MMHG | HEART RATE: 90 BPM

## 2018-05-09 PROCEDURE — G0157 HHC PT ASSISTANT EA 15: HCPCS

## 2018-05-09 PROCEDURE — G0299 HHS/HOSPICE OF RN EA 15 MIN: HCPCS

## 2018-05-10 ENCOUNTER — HOME CARE VISIT (OUTPATIENT)
Dept: SCHEDULING | Facility: HOME HEALTH | Age: 63
End: 2018-05-10
Payer: COMMERCIAL

## 2018-05-10 VITALS
OXYGEN SATURATION: 97 % | DIASTOLIC BLOOD PRESSURE: 70 MMHG | TEMPERATURE: 97.9 F | HEART RATE: 99 BPM | SYSTOLIC BLOOD PRESSURE: 115 MMHG

## 2018-05-10 PROCEDURE — G0157 HHC PT ASSISTANT EA 15: HCPCS

## 2018-05-11 ENCOUNTER — HOME CARE VISIT (OUTPATIENT)
Dept: SCHEDULING | Facility: HOME HEALTH | Age: 63
End: 2018-05-11
Payer: COMMERCIAL

## 2018-05-11 PROCEDURE — G0157 HHC PT ASSISTANT EA 15: HCPCS

## 2018-05-12 ENCOUNTER — HOME CARE VISIT (OUTPATIENT)
Dept: SCHEDULING | Facility: HOME HEALTH | Age: 63
End: 2018-05-12
Payer: COMMERCIAL

## 2018-05-12 VITALS
SYSTOLIC BLOOD PRESSURE: 132 MMHG | HEART RATE: 109 BPM | OXYGEN SATURATION: 97 % | DIASTOLIC BLOOD PRESSURE: 80 MMHG | TEMPERATURE: 97.5 F

## 2018-05-12 VITALS
SYSTOLIC BLOOD PRESSURE: 130 MMHG | HEART RATE: 98 BPM | OXYGEN SATURATION: 98 % | TEMPERATURE: 98.8 F | DIASTOLIC BLOOD PRESSURE: 87 MMHG

## 2018-05-12 PROCEDURE — G0157 HHC PT ASSISTANT EA 15: HCPCS

## 2018-05-13 ENCOUNTER — HOME CARE VISIT (OUTPATIENT)
Dept: SCHEDULING | Facility: HOME HEALTH | Age: 63
End: 2018-05-13
Payer: COMMERCIAL

## 2018-05-13 VITALS
HEART RATE: 110 BPM | TEMPERATURE: 98.2 F | DIASTOLIC BLOOD PRESSURE: 72 MMHG | OXYGEN SATURATION: 97 % | SYSTOLIC BLOOD PRESSURE: 130 MMHG

## 2018-05-13 PROCEDURE — G0157 HHC PT ASSISTANT EA 15: HCPCS

## 2018-05-14 ENCOUNTER — HOME CARE VISIT (OUTPATIENT)
Dept: HOME HEALTH SERVICES | Facility: HOME HEALTH | Age: 63
End: 2018-05-14
Payer: COMMERCIAL

## 2018-05-14 ENCOUNTER — HOME CARE VISIT (OUTPATIENT)
Dept: SCHEDULING | Facility: HOME HEALTH | Age: 63
End: 2018-05-14
Payer: COMMERCIAL

## 2018-05-14 VITALS
OXYGEN SATURATION: 98 % | TEMPERATURE: 99.2 F | DIASTOLIC BLOOD PRESSURE: 82 MMHG | SYSTOLIC BLOOD PRESSURE: 110 MMHG | HEART RATE: 100 BPM

## 2018-05-14 VITALS
DIASTOLIC BLOOD PRESSURE: 74 MMHG | HEART RATE: 101 BPM | RESPIRATION RATE: 16 BRPM | SYSTOLIC BLOOD PRESSURE: 133 MMHG | OXYGEN SATURATION: 97 % | TEMPERATURE: 98.5 F

## 2018-05-14 PROCEDURE — G0157 HHC PT ASSISTANT EA 15: HCPCS

## 2018-05-14 PROCEDURE — G0299 HHS/HOSPICE OF RN EA 15 MIN: HCPCS

## 2018-05-15 ENCOUNTER — HOME CARE VISIT (OUTPATIENT)
Dept: SCHEDULING | Facility: HOME HEALTH | Age: 63
End: 2018-05-15
Payer: COMMERCIAL

## 2018-05-15 ENCOUNTER — HOME CARE VISIT (OUTPATIENT)
Dept: HOME HEALTH SERVICES | Facility: HOME HEALTH | Age: 63
End: 2018-05-15
Payer: COMMERCIAL

## 2018-05-15 ENCOUNTER — TELEPHONE (OUTPATIENT)
Dept: ORTHOPEDIC SURGERY | Facility: CLINIC | Age: 63
End: 2018-05-15

## 2018-05-15 VITALS
HEART RATE: 98 BPM | DIASTOLIC BLOOD PRESSURE: 80 MMHG | SYSTOLIC BLOOD PRESSURE: 122 MMHG | TEMPERATURE: 98.7 F | OXYGEN SATURATION: 97 %

## 2018-05-15 PROCEDURE — G0151 HHCP-SERV OF PT,EA 15 MIN: HCPCS

## 2018-05-15 NOTE — TELEPHONE ENCOUNTER
Patient called in requesting to speak with nurse in regards to her stocking and knee. Please contact her at [phone].

## 2018-05-16 ENCOUNTER — HOME CARE VISIT (OUTPATIENT)
Dept: SCHEDULING | Facility: HOME HEALTH | Age: 63
End: 2018-05-16
Payer: COMMERCIAL

## 2018-05-16 VITALS
TEMPERATURE: 99 F | HEART RATE: 96 BPM | OXYGEN SATURATION: 97 % | SYSTOLIC BLOOD PRESSURE: 122 MMHG | DIASTOLIC BLOOD PRESSURE: 75 MMHG

## 2018-05-16 PROCEDURE — G0157 HHC PT ASSISTANT EA 15: HCPCS

## 2018-05-16 NOTE — TELEPHONE ENCOUNTER
Patient states she has only been wearing compression stocking on non operative leg, she tried wearing on surgical leg, but was unable to do so, tight. Per Linda Ann she states she has been doing. She has an appt tomorrow.

## 2018-05-17 ENCOUNTER — HOME CARE VISIT (OUTPATIENT)
Dept: HOME HEALTH SERVICES | Facility: HOME HEALTH | Age: 63
End: 2018-05-17
Payer: COMMERCIAL

## 2018-05-17 ENCOUNTER — OFFICE VISIT (OUTPATIENT)
Dept: ORTHOPEDIC SURGERY | Facility: CLINIC | Age: 63
End: 2018-05-17

## 2018-05-17 VITALS
HEIGHT: 64 IN | WEIGHT: 186 LBS | OXYGEN SATURATION: 96 % | TEMPERATURE: 98.4 F | RESPIRATION RATE: 16 BRPM | HEART RATE: 102 BPM | DIASTOLIC BLOOD PRESSURE: 63 MMHG | SYSTOLIC BLOOD PRESSURE: 120 MMHG | BODY MASS INDEX: 31.76 KG/M2

## 2018-05-17 DIAGNOSIS — G89.18 POST-OPERATIVE PAIN: Primary | ICD-10-CM

## 2018-05-17 DIAGNOSIS — Z96.652 S/P TOTAL KNEE REPLACEMENT, LEFT: ICD-10-CM

## 2018-05-17 RX ORDER — OXYCODONE AND ACETAMINOPHEN 7.5; 325 MG/1; MG/1
1 TABLET ORAL
Qty: 40 TAB | Refills: 0 | Status: SHIPPED | OUTPATIENT
Start: 2018-05-17

## 2018-05-17 NOTE — PROGRESS NOTES
Patient: Joaquin Poole  YOB: 1955       HISTORY:  The patient presents for reevaluation of her s/p left total knee arthroplasty on 5/2/18. Patient is improved, states pain is a 7 out of 10.  she has participated in H/H physical therapy. has been doing knee exercises at home. She is very happy with the results of her surgery so far. Patient denies any fever, chills, chest pain, shortness of breath or calf pain. There are no new illness or injuries to report since last seen in the office. PHYSICAL EXAMINATION:    Visit Vitals    /63    Pulse (!) 102    Temp 98.4 °F (36.9 °C) (Oral)    Resp 16    Ht 5' 4\" (1.626 m)    Wt 186 lb (84.4 kg)    SpO2 96%    BMI 31.93 kg/m2     The patient is a well-developed, well-nourished female in no acute distress. The patient is alert and oriented times three. The patient appears to be well groomed. Mood and affect are normal.   ORTHOPEDIC EXAM of Left knee: Inspection: Effusion present,  incisions clean, dry intact, staples in place  TTP: none  Range of motion: -3-85 flexion  Stability: Stable   Strength: 5/5  2+ distal pulses  Peroneal nerve intact      IMPRESSION:  Status post Left total knee arthroplasty. PLAN:  Incisions cleaned. Staples removed and steri strips applied  Patient is weight bearing as tolerated. Will set up outpt physical therapy. Patient given a refill of pain medication. Percocet 7.5mg Patient given pain medication for short term acute pain relief. Goal is to treat patient according to above plan and to ultimately have patient off all pain medications once appropriate. If chronic pain management is required beyond what is expected for current orthopedic problem, will refer patient to pain management.  was reviewed and will be reviewed with every medication refill request.   Patient will follow up in 2 weeks.     [facility]

## 2018-05-18 ENCOUNTER — HOME CARE VISIT (OUTPATIENT)
Dept: SCHEDULING | Facility: HOME HEALTH | Age: 63
End: 2018-05-18
Payer: COMMERCIAL

## 2018-05-18 VITALS
DIASTOLIC BLOOD PRESSURE: 80 MMHG | TEMPERATURE: 97.4 F | SYSTOLIC BLOOD PRESSURE: 124 MMHG | HEART RATE: 84 BPM | OXYGEN SATURATION: 97 %

## 2018-05-18 PROCEDURE — G0151 HHCP-SERV OF PT,EA 15 MIN: HCPCS

## 2018-05-22 ENCOUNTER — TELEPHONE (OUTPATIENT)
Dept: ORTHOPEDIC SURGERY | Age: 63
End: 2018-05-22

## 2018-05-22 ENCOUNTER — HOSPITAL ENCOUNTER (OUTPATIENT)
Dept: PHYSICAL THERAPY | Age: 63
Discharge: HOME | End: 2018-05-22
Payer: COMMERCIAL

## 2018-05-22 PROCEDURE — 97016 VASOPNEUMATIC DEVICE THERAPY: CPT

## 2018-05-22 PROCEDURE — 97162 PT EVAL MOD COMPLEX 30 MIN: CPT

## 2018-05-22 PROCEDURE — 97140 MANUAL THERAPY 1/> REGIONS: CPT

## 2018-05-22 PROCEDURE — 97110 THERAPEUTIC EXERCISES: CPT

## 2018-05-22 NOTE — TELEPHONE ENCOUNTER
Patient needs to discuss with Dr. Shweta Chisholm or JR reyna with regard to her physical therapy. Patient is having a medical procedure biopsy done and she does not want to be D/C from therapy due to missing an appt. Please sent a note to her therapist at [facility] in [place] for her so she will not be D/C.   Please call her back at [phone]

## 2018-05-22 NOTE — PROGRESS NOTES
PT DAILY TREATMENT NOTE     Patient Name: Ruthann Scherer  Date:2018  : 1955  [x]  Patient  Verified  Payor: BLUE CROSS / Plan: [employer] [facility] / Product Type: PPO /    In time:1002  Out time:1110  Total Treatment Time (min): 68 (Total Timed Codes: 28)  Visit #: 1 of 12    Treatment Area: Left knee pain [M25.562]    Physical Therapy Evaluation - Knee    SUBJECTIVE      Any medication changes, allergies to medications, adverse drug reactions, diagnosis change, or new procedure performed?: [x] No    [] Yes (see summary sheet for update)    Subjective functional status/changes:     PLOF: Ambulation without AD, Work full-time, (I) Functional ADLs, (I) Driving  Current Functional Status: Ambulation tolerance (20 minutes - SPC), (I) Self-care ADLs, (I) Driving, Not working  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 Functional Tests:  1. Stairs: Step-to gait pattern with single UE assist and SPC reported with ascent/descent  2.  SLS: Right 30 sec / Left 30 sec   3. 30-second-sit-to-stand: 6 repetitions (unweighting of L LE, completion without UE assistance)    Surgical Incision: Steristrips intact over >80% of surgical incision without demonstrating symptoms consistent with infection    Neurovascular Screen: 1+ L/R Dorsalis pedis, 1+ L/R Posterior tibialis, Intact, symmetrical sensation to light touch L2-S2  Observation: Non-tender, supple calf, Non-pitting edema noted localized to left knee    ROM / Strength [] Unable to assess                                                    AROM                      MMT (1-5)    Left Right Left Right   Hip Flexion   4+ 5    Extension        Abduction        ER   NT 5    IR   NT 5   Knee Flexion 83 132 5 5    Extension (0)10 0 2+ 5   Ankle Dorsiflexion   5 5   *Assessed in supine    Patellar Assessment:  Patellar Mobility:   L Patella: Normal medial/lateral glide, Hypomobile superior. inferior glide    OBJECTIVE    Modality rationale: decrease inflammation and decrease pain to improve the patients ability to improve ease with sleep   Min Type Additional Details   10 [x]  Vasopneumatic Device Pressure:       [x] lo [] med [] hi   Temperature: [x] lo [] med [] hi     30 min [x]Eval                  []Re-Eval     8 min Therapeutic Exercise:  [x] See flow sheet : Patient educated in completion of prescribed HEP and provided with written HEP instructions, Patient educated regarding diagnosis and PT PoC   Rationale: increase ROM and increase strength to improve the patients ability to improve ease with prolonged community ambulation    5 min Therapeutic Activity:  [x]  See flow sheet : Reviewed post-surgical instructions and importance of gradual return to PLOF to ensure patient pos-operative success   Rationale: increase ROM, increase strength and increase proprioception  to improve the patients ability to improve ease with return to work     15 min Manual Therapy:    Supine, Left Patellar Superior Grade III-IV Mobilization (OPP)  Supine, Left Patellar Superior Grade III Static Mobilization with SAQ MWM  Supine, Left Knee Manually-Resisted TKE  Supine, Left Hamstring 90-90 Contract-Relax  Supine, Left Hamstring 90-90 STM   Rationale: decrease pain, increase ROM and increase tissue extensibility to improve ease with stair management.        With   [] TE   [] TA   [] neuro   [] other: Patient Education: [x] Review HEP    [] Progressed/Changed HEP based on:   [] positioning   [] body mechanics   [] transfers   [] heat/ice application    [] other:      Pain Level (0-10 scale) post treatment: 0    ASSESSMENT/Changes in Function: Patient presents s/p left TKA 5/2/2018 with completion reported secondary to chronic knee varus deformity with patient denying any previous surgical history nor injury to the hip, lumbar spine nor ankle. Patient reports uncomplicated post-surgical recovery with completion of home health physical therapy 5/18/2018. Patient reports utilization of [facility] with long-distance ambulation with patient denying fall history. 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 Patient lives in a one-story apartment with elevator to enter with patient reporting current independence with all self-care ADLs with  with ability to assist as needed. Patient denies chronic right knee pain. Patient demonstrates a left antalgic gait pattern with ambulation with use of SPC with subjectively a step-to gait pattern with stair ascent/descent with patient demonstrating complete unweighting of the left LE with sit-to-stand transfer. Patient demonstrates a normal neurovascular screen with patient demonstrating appropriate healing of the surgical incision with tender, supple calf noted upon palpation. Patient demonstrates good SLS stability bilaterally on stable surface but noted with inability to achieve terminal weightbearing knee extension with patient objectively demonstrating left knee AROM 0-10-83 degrees. Patient demonstrates moderate quadriceps recruitment with poor patellar mobility demonstrated. Patient demonstrates deficits with assessment of the quadriceps and hamstring musculature.  Emphasis of treatment to be placed on improving available functional knee AROM and strength with progression of functional weightbearing exercises to improve ease with return to work and PLOF. Patient will continue to benefit from skilled PT services to modify and progress therapeutic interventions, address functional mobility deficits, address ROM deficits, address strength deficits, analyze and address soft tissue restrictions, analyze and cue movement patterns, analyze and modify body mechanics/ergonomics and assess and modify postural abnormalities to attain remaining goals. [x]  See Plan of Care  []  See progress note/recertification  []  See Discharge Summary         Progress towards goals / Updated goals:    Short Term Goals: To be accomplished in 3 weeks:  1. Patient will subjectively report full compliance with prescribed HEP. Eval: HEP provided  2. Patient will demonstrate left knee AROM 0-120 degrees in order to improve ease with stair management. Eval: Left Knee AROM 0-10-83 degrees  3. Patient will demonstrate left knee extension MMT 5/5 to improve ease with curb management. Eval: Left Knee Extension MMT = 2+/5    Long Term Goals: To be accomplished in 6 weeks:  1. Patient will demonstrate a significant functional improvement as demonstrated by a score of >/= 58 on FOTO. Eval: FOTO = 43  2. 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 Patient will demonstrate left SLS (airex) >/=30 seconds to improve ease with ambulation on uneven surfaces.   Eval: Right SLS (non-Airex) 30 sec / Left SLS (non-Airex) 30 sec     PLAN  [x]  Upgrade activities as tolerated     []  Continue plan of care  []  Update interventions per flow sheet       []  Discharge due to:_  []  Other:_      Brenda Samuel, PT 5/22/2018  8:34 AM    Future Appointments  Date Time Provider Luiza Petit   5/22/2018 10:00 AM Mahi Reed, PT [facility]   5/31/2018 2:20 PM NIKOS Blount Ilya 70

## 2018-05-22 NOTE — PROGRESS NOTES
[facility] - [facility]              [address]        [place], [address] [place]   [phone] fax    Plan of Care/ Statement of Necessity for Physical Therapy Services  Patient name: Anthony Agosto Start of Care: 2018   Referral source: Juliet Knott MD : 1955    Medical Diagnosis: Left knee pain [M25.562]   Onset Date: DoS 2018    Treatment Diagnosis: s/p Left TKA   Prior Hospitalization: see medical history Provider#: [id number]   Medications: Verified on Patient summary List    Comorbidities: Arthritis, BMI>30   Prior Level of Function: Ambulation without AD, Work full-time, (I) Functional ADLs, (I) Driving     The Plan of Care and following information is based on the information from the initial evaluation. Assessment:    Patient presents s/p left TKA 2018 with completion reported secondary to chronic knee varus deformity with patient denying any previous surgical history nor injury to the hip, lumbar spine nor ankle. Patient reports uncomplicated post-surgical recovery with completion of home health physical therapy 2018. Patient reports utilization of [facility] with long-distance ambulation with patient denying fall history. 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 Patient lives in a one-story apartment with elevator to enter with patient reporting current independence with all self-care ADLs with  with ability to assist as needed. Patient denies chronic right knee pain. Patient demonstrates a left antalgic gait pattern with ambulation with use of SPC with subjectively a step-to gait pattern with stair ascent/descent with patient demonstrating complete unweighting of the left LE with sit-to-stand transfer.  Patient demonstrates a normal neurovascular screen with patient demonstrating appropriate healing of the surgical incision with tender, supple calf noted upon palpation. Patient demonstrates good SLS stability bilaterally on stable surface but noted with inability to achieve terminal weightbearing knee extension with patient objectively demonstrating left knee AROM 0-10-83 degrees. Patient demonstrates moderate quadriceps recruitment with poor patellar mobility demonstrated. Patient demonstrates deficits with assessment of the quadriceps and hamstring musculature. Emphasis of treatment to be placed on improving available functional knee AROM and strength with progression of functional weightbearing exercises to improve ease with return to work and PLOF.     Patient will continue to benefit from skilled PT services to modify and progress therapeutic interventions, address functional mobility deficits, address ROM deficits, address strength deficits, analyze and address soft tissue restrictions, analyze and cue movement patterns, analyze and modify body mechanics/ergonomics and assess and modify postural abnormalities to attain remaining goals. Key Information:    BP: 116/74 mmHg  Posture/Observation:                        Static Standing: Unweighting of the L LE     Gait: Left antalgic gait pattern with use of SPC and reduced left stance phase time.     Functional Tests:  1. Stairs: Step-to gait pattern with single UE assist and SPC reported with ascent/descent  2.  SLS: Right 30 sec / Left 30 sec   3. 30-second-sit-to-stand: 6 repetitions (unweighting of L LE, completion without UE assistance)     Surgical Incision: Steristrips intact over >80% of surgical incision without demonstrating symptoms consistent with infection     Neurovascular Screen: 1+ L/R Dorsalis pedis, 1+ L/R Posterior tibialis, Intact, symmetrical sensation to light touch L2-S2  Observation: Non-tender, supple calf, Non-pitting edema noted localized to left knee     ROM / Strength [] Unable to assess AROM                      MMT (1-5)      Left Right Left Right   Hip Flexion     4+ 5     Extension             Abduction             ER     NT 5     IR     NT 5   Knee Flexion 83 132 5 5     Extension (0)10 0 2+ 5   Ankle Dorsiflexion     5 5   *Assessed in supine     Patellar Assessment:  Patellar Mobility:           L Patella: Normal medial/lateral glide, Hypomobile superior. inferior glide    Evaluation Complexity History MEDIUM  Complexity : 1-2 comorbidities / personal factors will impact the outcome/ POC ; Examination MEDIUM Complexity : 3 Standardized tests and measures addressing body structure, function, activity limitation and / or participation in recreation  ;Presentation MEDIUM Complexity : Evolving with changing characteristics  ; Clinical Decision Making MEDIUM Complexity : FOTO score of 26-74  Overall Complexity Rating: MEDIUM  Problem List: pain affecting function, decrease ROM, decrease strength, edema affecting function, impaired gait/ balance, decrease ADL/ functional abilitiies, decrease activity tolerance, decrease flexibility/ joint mobility and decrease transfer abilities   Treatment Plan may include any combination of the following: Therapeutic exercise, Therapeutic activities, Neuromuscular re-education, Physical agent/modality, Gait/balance training, Manual therapy, Patient education, Self Care training, Functional mobility training, Home safety training and Stair training  Patient / Family readiness to learn indicated by: asking questions, trying to perform skills and interest  Persons(s) to be included in education: patient (P)  Barriers to Learning/Limitations: None  Patient Goal (s): Get rid of the pain and be able to walk freely  Patient Self Reported Health Status: good  Rehabilitation Potential: good    Short Term Goals:  To be accomplished in 3 weeks:  1. Patient will subjectively report full compliance with prescribed HEP. 2. Patient will demonstrate left knee AROM 0-120 degrees in order to improve ease with stair management. 3. Patient will demonstrate left knee extension MMT 5/5 to improve ease with curb management. Long Term Goals: To be accomplished in 6 weeks:  1. Patient will demonstrate a significant functional improvement as demonstrated by a score of >/= 58 on FOTO. 2. 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 3. Patient will demonstrate left SLS (airex) >/=30 seconds to improve ease with ambulation on uneven surfaces. Frequency / Duration: Patient to be seen 2 times per week for 6 weeks. Patient/ Caregiver education and instruction: Diagnosis, prognosis, self care, activity modification and exercises   [x]  Plan of care has been reviewed with GILDA Laws, PT 5/22/2018 8:35 AM  ________________________________________________________________________    I certify that the above Therapy Services are being furnished while the patient is under my care. I agree with the treatment plan and certify that this therapy is necessary.     [de-identified] Signature:____________________  Date:____________Time: _________    Please sign and return to [facility] - [facility]              [address], [address] [place]   [phone] fax

## 2018-05-23 NOTE — TELEPHONE ENCOUNTER
Contacted pt at her home number. Pt states that she no longer needs to have the letter done. Physical therapy was able to reschedule her for a day that will not interfere with the biopsy. Pt thankful for the call back.

## 2018-05-24 ENCOUNTER — TELEPHONE (OUTPATIENT)
Dept: ORTHOPEDIC SURGERY | Facility: CLINIC | Age: 63
End: 2018-05-24

## 2018-05-24 NOTE — TELEPHONE ENCOUNTER
Patient called for [facility] Nurse. Patient said the Oxycodone does not work. That she is still having pain on her left knee. Patient states that the percocet Dr. Hugo Peace use to give her before use to work , but the Oxycodone they gave her on 05/17/18 does not work at all. That does not even touch the pain. Patient is requesting  the Previous Percocet medication that was prescribed to her before. Will  from the [facility] location. Patient tel. [phone].

## 2018-05-25 ENCOUNTER — HOSPITAL ENCOUNTER (OUTPATIENT)
Dept: PHYSICAL THERAPY | Age: 63
Discharge: HOME | End: 2018-05-25
Payer: COMMERCIAL

## 2018-05-25 PROCEDURE — 97016 VASOPNEUMATIC DEVICE THERAPY: CPT

## 2018-05-25 PROCEDURE — 97110 THERAPEUTIC EXERCISES: CPT

## 2018-05-25 NOTE — PROGRESS NOTES
PT DAILY TREATMENT NOTE 3-16    Patient Name: Farnaz Núñez  Date:2018  : 1955  [x]  Patient  Verified  Payor: BLUE CROSS / Plan:  [facility] / Product Type: PPO /    In time:10:05  Out time:10:43  Total Treatment Time (min): 38  Visit #: 2 of 12    Treatment Area: Left knee pain [M25.562]    SUBJECTIVE  Pain Level (0-10 scale): 5-6/10  Any medication changes, allergies to medications, adverse drug reactions, diagnosis change, or new procedure performed?: [x] No    [] Yes (see summary sheet for update)  Subjective functional status/changes:   [] No changes reported  \"Let me tell you, I'm in excruciating pain. I was supposed to have a procedure yesterday and then ended up not needing it so I was off of Celebrex for two days, but now I'm back on it cause I need it. \"    OBJECTIVE  Modality rationale: decrease edema, decrease inflammation and decrease pain to improve the patients ability to ease soreness after therapy   Min Type Additional Details    [] Estim:  []Unatt       []IFC  []Premod                        []Other:  []w/ice   []w/heat  Position:  Location:    [] Estim: []Att    []TENS instruct  []NMES                    []Other:  []w/US   []w/ice   []w/heat  Position:  Location:    []  Traction: [] Cervical       []Lumbar                       [] Prone          []Supine                       []Intermittent   []Continuous Lbs:  [] before manual  [] after manual    []  Ultrasound: []Continuous   [] Pulsed                           []1MHz   []3MHz Location:  W/cm2:    []  Iontophoresis with dexamethasone         Location: [] Take home patch   [] In clinic    []  Ice     []  heat  []  Ice massage  []  Laser   []  Anodyne Position:  Location:    []  Laser with stim  []  Other: Position:  Location:   10 [x]  Vasopneumatic Device Pressure:       [x] lo [] med [] hi   Temperature: [x] lo [] med [] hi   [] Skin assessment post-treatment:  []intact []redness- no adverse reaction    []redness - adverse reaction:     23 min Therapeutic Exercise:  [x] See flow sheet :   Rationale: increase ROM, increase strength and improve coordination to improve the patients ability to increase ease with ADLs    5 min Manual Therapy:  STM to distal hamstring and quad   Rationale: decrease pain, increase ROM and increase tissue extensibility to ease ADL tolerance           With   [] TE   [] TA   [] neuro   [] other: Patient Education: [x] Review HEP    [] Progressed/Changed HEP based on:   [] positioning   [] body mechanics   [] transfers   [] heat/ice application    [] other:      Other Objective/Functional Measures:   First follow up session---cues sequencing and correct form throughout     Pain Level (0-10 scale) post treatment: 5    ASSESSMENT/Changes in Function:   Initiated POC per flowsheet. Patient is in a lot of pain and reports inconsistencies with taking pain meds secondary to a procedure she was supposed to have done and then was told she didn't need it. She has extremely poor tolerance to all exercises and gentle STM to distal hamstring. Very slow intervention performance due to poor pain tolerance. Focused on bed-based exercises today. Continue with standing interventions as patient is able. Patient will continue to benefit from skilled PT services to modify and progress therapeutic interventions, address functional mobility deficits, address ROM deficits, address strength deficits, analyze and address soft tissue restrictions, analyze and cue movement patterns, analyze and modify body mechanics/ergonomics and assess and modify postural abnormalities to attain remaining goals. []  See Plan of Care  []  See progress note/recertification  []  See Discharge Summary         Progress towards goals / Updated goals:     Short Term Goals: To be accomplished in 3 weeks:  1. Patient will subjectively report full compliance with prescribed HEP. Eval: HEP provided  Current: not met, \"I was in to much pain.  I didn't sleep for two nights. \" (5/25/2018)  2. Patient will demonstrate left knee AROM 0-120 degrees in order to improve ease with stair management. Eval: Left Knee AROM 0-10-83 degrees  3. Patient will demonstrate left knee extension MMT 5/5 to improve ease with curb management. Eval: Left Knee Extension MMT = 2+/5     Long Term Goals: To be accomplished in 6 weeks:  1. Patient will demonstrate a significant functional improvement as demonstrated by a score of >/= 58 on FOTO. Eval: FOTO = 43  2. 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 Patient will demonstrate left SLS (airex) >/=30 seconds to improve ease with ambulation on uneven surfaces.   Eval: Right SLS (non-Airex) 30 sec / Left SLS (non-Airex) 30 sec        PLAN  []  Upgrade activities as tolerated     [x]  Continue plan of care  []  Update interventions per flow sheet       []  Discharge due to:_  []  Other:_      Jenni Brown 5/25/2018  10:09 AM    Future Appointments  Date Time Provider Luiza Petit   5/31/2018 10:00 AM NIKOS Blount   6/6/2018 12:00 PM Mahi Reed PT [facility]   6/8/2018 9:00 AM [facility]   6/13/2018 10:30 AM Landry Lux, PTA [facility]   6/15/2018 10:00 AM Landry Lux, PTA [facility]   6/20/2018 10:30 AM Landry Lux, PTA [facility]   6/22/2018 10:00 AM Landry Lux, PTA [facility]   6/27/2018 10:30 AM Landry Lux, PTA [facility]   6/29/2018 10:00 AM Landry Lux, PTA [facility]   7/3/2018 10:30 AM Landry Lux, PTA [facility]   7/6/2018 9:30 AM Mahi Reed, PT [facility]   7/11/2018 10:30 AM Landry Lux, PTA [facility]   7/13/2018 10:00 AM Landry Lux, PTA [facility]

## 2018-05-30 ENCOUNTER — HOSPITAL ENCOUNTER (OUTPATIENT)
Dept: PHYSICAL THERAPY | Age: 63
Discharge: HOME | End: 2018-05-30
Payer: COMMERCIAL

## 2018-05-30 PROCEDURE — 97110 THERAPEUTIC EXERCISES: CPT

## 2018-05-30 PROCEDURE — 97016 VASOPNEUMATIC DEVICE THERAPY: CPT

## 2018-05-30 PROCEDURE — 97140 MANUAL THERAPY 1/> REGIONS: CPT

## 2018-05-30 NOTE — PROGRESS NOTES
PT DAILY TREATMENT NOTE     Patient Name: Al Acuna  Date:2018  : 1955  [x]  Patient  Verified  Payor: BLUE CROSS / Plan: [employer] [facility] / Product Type: PPO /    In time:9:06  Out time:10:02  Total Treatment Time (min): 56  Total timed codes = 46  Visit #: 3 of 12    Treatment Area: Left knee pain [M25.562]    SUBJECTIVE  Pain Level (0-10 scale): 4  Any medication changes, allergies to medications, adverse drug reactions, diagnosis change, or new procedure performed?: [x] No    [] Yes (see summary sheet for update)  Subjective functional status/changes:   [] No changes reported  Pt reports that she has been doing her home exercises a lot. OBJECTIVE    Modality rationale: decrease pain to improve the patients ability to decrease difficulty while performing tasks.     Min Type Additional Details    [] Estim:  []Unatt       []IFC  []Premod                        []Other:  []w/ice   []w/heat  Position:  Location:    [] Estim: []Att    []TENS instruct  []NMES                    []Other:  []w/US   []w/ice   []w/heat  Position:  Location:    []  Traction: [] Cervical       []Lumbar                       [] Prone          []Supine                       []Intermittent   []Continuous Lbs:  [] before manual  [] after manual    []  Ultrasound: []Continuous   [] Pulsed                           []1MHz   []3MHz W/cm2:  Location:    []  Iontophoresis with dexamethasone         Location: [] Take home patch   [] In clinic    []  Ice     []  heat  []  Ice massage  []  Laser   []  Anodyne Position:  Location:    []  Laser with stim  []  Other:  Position:  Location:   10 [x]  Vasopneumatic Device Pressure:       [x] lo [] med [] hi   Temperature: [x] lo [] med [] hi   [] Skin assessment post-treatment:  []intact []redness- no adverse reaction    []redness - adverse reaction:       36 min Therapeutic Exercise:  [x] See flow sheet :   Rationale: increase ROM and increase strength to improve the patients ability to increase tolerance to activities. 10 min Manual Therapy:  Patellar mobs, knee flexion and extension mobs, STM/TPR to quads and hamstrings, PROM   Rationale: decrease pain, increase ROM, increase tissue extensibility and decrease trigger points to increase ease with ADLs. With   [] TE   [] TA   [] neuro   [] other: Patient Education: [x] Review HEP    [] Progressed/Changed HEP based on:   [] positioning   [] body mechanics   [] transfers   [] heat/ice application    [] other:      Other Objective/Functional Measures: pre manual 5-96 deg; post manual 5-97 deg. Pain Level (0-10 scale) post treatment: 2    ASSESSMENT/Changes in Function: Pt was able to tolerate all exercises this session. Patient will continue to benefit from skilled PT services to modify and progress therapeutic interventions, address functional mobility deficits, address ROM deficits, address strength deficits and analyze and address soft tissue restrictions to attain remaining goals. []  See Plan of Care  []  See progress note/recertification  []  See Discharge Summary         Progress towards goals / Updated goals:      Short Term Goals: To be accomplished in 3 weeks:  1. Patient will subjectively report full compliance with prescribed HEP. Eval: HEP provided  Current: not met, \"I was in to much pain. I didn't sleep for two nights. \" (5/25/2018)  2. Patient will demonstrate left knee AROM 0-120 degrees in order to improve ease with stair management. Eval: Left Knee AROM 0-10-83 degrees  Current: progressing: Left knee AROM 5-97 deg. 5/30/18  3. Patient will demonstrate left knee extension MMT 5/5 to improve ease with curb management. Eval: Left Knee Extension MMT = 2+/5      Long Term Goals: To be accomplished in 6 weeks:  1. Patient will demonstrate a significant functional improvement as demonstrated by a score of >/= 58 on FOTO. Eval: FOTO = 43  2.  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 Patient will demonstrate left SLS (airex) >/=30 seconds to improve ease with ambulation on uneven surfaces.   Eval: Right SLS (non-Airex) 30 sec / Left SLS (non-Airex) 30 sec        PLAN  []  Upgrade activities as tolerated     [x]  Continue plan of care  []  Update interventions per flow sheet       []  Discharge due to:_  []  Other:_      Rico Leo, PTA 5/30/2018  9:04 AM    Future Appointments  Date Time Provider Luiza Marisabel   5/31/2018 10:00 AM Rich Morrissey PA-C [id number] [facility]   6/1/2018 9:00 AM Rico Leo, PTA [facility]   6/4/2018 8:00 AM Tamara Leyva, PT [facility]   6/7/2018 8:30 AM Tamara Leyva, PT [facility]   6/13/2018 10:30 AM Rico Leo, PTA [facility]   6/15/2018 10:00 AM Rico Leo, PTA [facility]   6/20/2018 10:30 AM Rico Leo, PTA [facility]   6/22/2018 10:00 AM Rico Leo, PTA [facility]   6/27/2018 10:30 AM Rico Leo, PTA [facility]   6/29/2018 10:00 AM Rico Leo, PTA [facility]   7/3/2018 10:30 AM Rico Leo, PTA [facility]   7/6/2018 9:30 AM Tamara Leyva, PT [facility]   7/11/2018 10:30 AM GILDA Trevino [facility]   7/13/2018 10:00 AM GILDA Trevino [facility]

## 2018-05-31 ENCOUNTER — OFFICE VISIT (OUTPATIENT)
Dept: ORTHOPEDIC SURGERY | Age: 63
End: 2018-05-31

## 2018-05-31 VITALS
BODY MASS INDEX: 31.58 KG/M2 | TEMPERATURE: 105 F | HEIGHT: 64 IN | DIASTOLIC BLOOD PRESSURE: 72 MMHG | OXYGEN SATURATION: 97 % | RESPIRATION RATE: 16 BRPM | SYSTOLIC BLOOD PRESSURE: 134 MMHG | HEART RATE: 106 BPM | WEIGHT: 185 LBS

## 2018-05-31 DIAGNOSIS — Z96.652 STATUS POST TOTAL LEFT KNEE REPLACEMENT: Primary | ICD-10-CM

## 2018-05-31 NOTE — PROGRESS NOTES
[address] [place] [address]  [phone]           Patient: Al Acuna                MRN: [id number]       SSN: [id number]  YOB: 1955        AGE: 59 y.o. SEX: female  Body mass index is 31.76 kg/(m^2). PCP: Jessica Lanier MD  05/31/18      This office note has been dictated. REVIEW OF SYSTEMS:  Constitutional: Negative for fever, chills, weight loss and malaise/fatigue. HENT: Negative. Eyes: Negative. Respiratory: Negative. Cardiovascular: Negative. Gastrointestinal: No bowel incontinence or constipation. Genitourinary: No bladder incontinence or saddle anesthesia. Skin: Negative. Neurological: Negative. Endo/Heme/Allergies: Negative. Psychiatric/Behavioral: Negative. Musculoskeletal: As per HPI above. Past Medical History:   Diagnosis Date    Arthritis          Current Outpatient Prescriptions:     oxyCODONE-acetaminophen (PERCOCET) 7.5-325 mg per tablet, Take 1 Tab by mouth every six (6) hours as needed for Pain. Max Daily Amount: 4 Tabs., Disp: 40 Tab, Rfl: 0    celecoxib (CELEBREX) 200 mg capsule, Take 1 Cap by mouth two (2) times a day for 90 days. , Disp: 60 Cap, Rfl: 2    oxyCODONE-acetaminophen (PERCOCET)  mg per tablet, Take 1-2 Tabs by mouth every four (4) hours as needed for Pain. Max Daily Amount: 12 Tabs., Disp: 60 Tab, Rfl: 0    VITAMIN D2 50,000 unit capsule, Take 50,000 Units by mouth every Monday, Wednesday, Friday., Disp: , Rfl: 0    polyethylene glycol (MIRALAX) 17 gram/dose powder, Take 17 g by mouth daily. , Disp: , Rfl:     buffered aspirin (BUFFERIN) 325 mg tablet, Take 1 Tab by mouth two (2) times a day., Disp: 60 Tab, Rfl: 0    ferrous sulfate 325 mg (65 mg iron) tablet, Take 1 Tab by mouth two (2) times daily (with meals). , Disp: 60 Tab, Rfl: 2    No Known Allergies    Social History     Social History    Marital status:      Spouse name: N/A    Number of children: N/A    Years of education: N/A     Occupational History    Not on file. Social History Main Topics    Smoking status: Former Smoker    Smokeless tobacco: Never Used    Alcohol use No    Drug use: No    Sexual activity: Not on file     Other Topics Concern    Not on file     Social History Narrative       Past Surgical History:   Procedure Laterality Date    HX COLONOSCOPY  2009    HX HEMORRHOIDECTOMY  2016    HX HYSTERECTOMY                   We did see Ms. Angeles for followup with regards to her left knee replacement. The patient is now four weeks status post surgery and progressing slowly. She has been noncompliant in working on her range of motion activities. She has been sitting in the recliner chair. She has been working in physical therapy without complications. She has had no troubles with the wounds, and she has had no fevers, chills, systemic changes, or injuries to report, and no chest pain or shortness of breath. Her pain is well controlled. PHYSICAL EXAMINATION:  In general, the patient is alert and oriented x 3 in no acute distress. The patient is well-developed, well-nourished, with a normal affect. The patient is afebrile. Examination of the left knee reveals the skin is intact. The surgical wounds are healed nicely. There is no erythema, no ecchymosis, no warmth, and no signs of infection or cellulitis present. Range of motion is about -3° to 95°. The patella tracks nicely. Stability is quite good. There is no cyanosis or clubbing present distally. There is mild edema without calf tenderness. There is a negative Yoshis sign, and no signs for DVT present. ASSESSMENT:  Status post left knee replacement. PLAN:  At this point, the patient was instructed on range of motion activities on her own on an hourly basis for both flexion and extension. These were demonstrated for the patient today in the office.   She will call with any questions or concerns that shall arise with outpatient physical therapy. We did discuss manipulation, and we will see how she is moving her knee upon her next visit in two weeks time. We will also plan on doing an x-ray of her left knee upon her return. She denies the need for analgesics today.           JR German VILLALOBOS, PA-C, ATC

## 2018-06-01 ENCOUNTER — HOSPITAL ENCOUNTER (OUTPATIENT)
Dept: PHYSICAL THERAPY | Age: 63
Discharge: HOME | End: 2018-06-01
Payer: COMMERCIAL

## 2018-06-01 PROCEDURE — 97110 THERAPEUTIC EXERCISES: CPT

## 2018-06-01 PROCEDURE — 97140 MANUAL THERAPY 1/> REGIONS: CPT

## 2018-06-01 PROCEDURE — 97016 VASOPNEUMATIC DEVICE THERAPY: CPT

## 2018-06-01 NOTE — PROGRESS NOTES
PT DAILY TREATMENT NOTE 12    Patient Name: Divina Andrade  Date:2018  : 1955  [x]  Patient  Verified  Payor: BLUE CROSS / Plan:  [facility] / Product Type: PPO /    In time:9:39  Out time:10:49  Total Treatment Time (min): 70 (10 min discussing rehabilitation with patient)   Visit #: 4 of 12    Treatment Area: Left knee pain [M25.562]    SUBJECTIVE  Pain Level (0-10 scale): 3-4/10   Any medication changes, allergies to medications, adverse drug reactions, diagnosis change, or new procedure performed?: [x] No    [] Yes (see summary sheet for update)  Subjective functional status/changes:   [] No changes reported  Patient stated that she has pain at anterior knee and stiffness at posterior knee at distal HS. OBJECTIVE    Modality rationale: decrease edema, decrease inflammation and decrease pain to improve the patients ability to perform ADLs.     Min Type Additional Details    [] Estim:  []Unatt       []IFC  []Premod                        []Other:  []w/ice   []w/heat  Position:  Location:    [] Estim: []Att    []TENS instruct  []NMES                    []Other:  []w/US   []w/ice   []w/heat  Position:  Location:    []  Traction: [] Cervical       []Lumbar                       [] Prone          []Supine                       []Intermittent   []Continuous Lbs:  [] before manual  [] after manual    []  Ultrasound: []Continuous   [] Pulsed                           []1MHz   []3MHz W/cm2:  Location:    []  Iontophoresis with dexamethasone         Location: [] Take home patch   [] In clinic    []  Ice     []  heat  []  Ice massage  []  Laser   []  Anodyne Position:  Location:    []  Laser with stim  []  Other:  Position:  Location:   10 [x]  Vasopneumatic Device Pressure:       [x] lo [] med [] hi   Temperature: [x] lo [] med [] hi   [] Skin assessment post-treatment:  []intact []redness- no adverse reaction    []redness - adverse reaction:       35 min Therapeutic Exercise:  [x] See flow sheet :   Rationale: increase ROM and increase strength to improve the patients ability to perform ADLs. 15 min Manual Therapy:  STM to distal HS, superior patellar glide, Gentle PROM    Rationale: decrease pain, increase ROM and increase tissue extensibility to increase ease with ADLs. With   [] TE   [] TA   [] neuro   [] other: Patient Education: [x] Review HEP    [] Progressed/Changed HEP based on:   [] positioning   [] body mechanics   [] transfers   [] heat/ice application    [] other:      Other Objective/Functional Measures:   Left knee AROM following manual: 4-97deg     Pain Level (0-10 scale) post treatment: 3/10    ASSESSMENT/Changes in Function: Tightness noted at distal HS, no tenderness/tightness noted at gastroc. Patient reports anterior knee pain when knee is in prolonged knee extension. Therapist reviewed a few of the home exercises that patient was confused about and had her perform in [address] [place] [address]. Patient reported a slight decrease in pain at end of the session. Therapist spent time discussing with patient rehabilitation with a total knee replacement and patient's symptoms. Patient also asked about her compression stockings and therapist advised patient to contact MD in regards to when she can stop wearing them. Patient was not wearing the compression stockings in PT today. Patient will continue to benefit from skilled PT services to modify and progress therapeutic interventions, address functional mobility deficits, address ROM deficits, address strength deficits, analyze and address soft tissue restrictions, analyze and cue movement patterns, assess and modify postural abnormalities and address imbalance/dizziness to attain remaining goals. []  See Plan of Care  []  See progress note/recertification  []  See Discharge Summary         Progress towards goals / Updated goals:  Short Term Goals: To be accomplished in 3 weeks:  1.  Patient will subjectively report full compliance with prescribed HEP. Eval: HEP provided  Current: not met, \"I was in to much pain. I didn't sleep for two nights. \" (5/25/2018)  2. Patient will demonstrate left knee AROM 0-120 degrees in order to improve ease with stair management. Eval: Left Knee AROM 0-10-83 degrees  Current: progressing: Left knee AROM 4-97 deg. 6/1/18  3. Patient will demonstrate left knee extension MMT 5/5 to improve ease with curb management. Eval: Left Knee Extension MMT = 2+/5      Long Term Goals: To be accomplished in 6 weeks:  1. Patient will demonstrate a significant functional improvement as demonstrated by a score of >/= 58 on FOTO. Eval: FOTO = 43  2. 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 Patient will demonstrate left SLS (airex) >/=30 seconds to improve ease with ambulation on uneven surfaces.   Eval: Right SLS (non-Airex) 30 sec / Left SLS (non-Airex) 30 sec        PLAN  []  Upgrade activities as tolerated     [x]  Continue plan of care  []  Update interventions per flow sheet       []  Discharge due to:_  []  Other:_      Miranda Schuster, PT 6/1/2018  9:42 AM    Future Appointments  Date Time Provider Luiza Petit   6/4/2018 8:00 AM Mahi Reed PT [facility]   6/7/2018 8:30 AM Mahi Reed PT [facility]   6/13/2018 10:30 AM Landry Lux, PTA [facility]   6/15/2018 10:00 AM [facility] Lux, [facility]   [facility]   6/20/2018 10:30 AM Landry Lux, PTA [facility]   6/22/2018 10:00 AM Landry Lux, PTA [facility]   6/27/2018 10:30 AM Landry Lux, PTA [facility]   6/29/2018 10:00 AM Landry Lux, PTA [facility]   7/3/2018 10:30 AM Landry Lux, PTA [facility]   7/6/2018 9:30 AM Mahi Reed, PT [facility]   7/11/2018 10:30 AM [facility]   7/13/2018 10:00 AM Oumou Baumann PTA [facility]

## 2018-06-04 ENCOUNTER — HOSPITAL ENCOUNTER (OUTPATIENT)
Dept: PHYSICAL THERAPY | Age: 63
Discharge: HOME | End: 2018-06-04
Payer: COMMERCIAL

## 2018-06-04 DIAGNOSIS — G89.18 POST-OPERATIVE PAIN: ICD-10-CM

## 2018-06-04 PROCEDURE — 97140 MANUAL THERAPY 1/> REGIONS: CPT

## 2018-06-04 PROCEDURE — 97016 VASOPNEUMATIC DEVICE THERAPY: CPT

## 2018-06-04 PROCEDURE — 97112 NEUROMUSCULAR REEDUCATION: CPT

## 2018-06-04 PROCEDURE — 97110 THERAPEUTIC EXERCISES: CPT

## 2018-06-04 RX ORDER — OXYCODONE AND ACETAMINOPHEN 7.5; 325 MG/1; MG/1
1 TABLET ORAL
Qty: 40 TAB | Refills: 0 | OUTPATIENT
Start: 2018-06-04

## 2018-06-04 NOTE — PROGRESS NOTES
PT DAILY TREATMENT NOTE     Patient Name: Farnaz Núñez  Date:2018  : 1955  [x]  Patient  Verified  Payor: BLUE CROSS / Plan: [employer] [facility] / Product Type: PPO /    In time:808  Out time:915  Total Treatment Time (min): 67 (Total Timed Codes: 62)  Visit #: 5 of 12    Treatment Area: Left knee pain [M25.562]    SUBJECTIVE  Pain Level (0-10 scale): 4  Any medication changes, allergies to medications, adverse drug reactions, diagnosis change, or new procedure performed?: [x] No    [] Yes (see summary sheet for update)  Subjective functional status/changes:   [] No changes reported  Patient reports ambulating increased distances with good tolerance. OBJECTIVE    Modality rationale: decrease inflammation and decrease pain to improve the patients ability to improve ease with sleep   Min Type Additional Details   10 [x]  Vasopneumatic Device Pressure:       [x] lo [] med [] hi   Temperature: [x] lo [] med [] hi     30 min Therapeutic Exercise:  [x] See flow sheet : Emphasis placed on improving available knee AROM and strength of the LE musculature   Rationale: increase ROM and increase strength to improve the patients ability to improve ease with stair management     17 min Neuromuscular Re-education:  [x]  See flow sheet : Emphasis placed on improving activation and recruitment of the quadriceps and gluteal musculature and improving LE proprioceptive and kinesthetic awareness   Rationale: increase ROM, increase strength, improve balance and increase proprioception  to improve the patients ability to improve safety with community ambulation.     10 min Manual Therapy:    Supine, Left Patellar Superior Grade III-IV Mobilization (OPP)  Supine, Left Patellar Superior Grade III Static Mobilization with SAQ MWM  Supine, Left Knee Manually-Resisted TKE  Supine, Left Hamstring 90-90 Contract-Relax  Supine, Left Hamstring 90-90 STM   Rationale: decrease pain, increase ROM and increase tissue extensibility to improve ease with stair management. With   [] TE   [] TA   [] neuro   [] other: Patient Education: [x] Review HEP    [] Progressed/Changed HEP based on:   [] positioning   [] body mechanics   [] transfers   [] heat/ice application    [] other:      Other Objective/Functional Measures:     Pre-Manual Knee AROM: 0-5-95  Post-Manual Knee AROM: 0-3-95     Pain Level (0-10 scale) post treatment: 3    ASSESSMENT/Changes in Function: Reviewed completion of scar massage to improve scar mobility. Patient continues to demonstrate significant hypertonicity of the hamstring musculature, medial > lateral, with patient provided with updated HEP to address muscular length deficits. Patient will continue to benefit from skilled PT services to modify and progress therapeutic interventions, address functional mobility deficits, address ROM deficits, address strength deficits, analyze and address soft tissue restrictions, analyze and cue movement patterns, analyze and modify body mechanics/ergonomics and assess and modify postural abnormalities to attain remaining goals. []  See Plan of Care  []  See progress note/recertification  []  See Discharge Summary         Progress towards goals / Updated goals:    Short Term Goals: To be accomplished in 3 weeks:  1. Patient will subjectively report full compliance with prescribed HEP. Eval: HEP provided  Current: not met, \"I was in to much pain. I didn't sleep for two nights. \" (5/25/2018)  2. Patient will demonstrate left knee AROM 0-120 degrees in order to improve ease with stair management. Eval: Left Knee AROM 0-10-83 degrees  Current: progressing: Left knee AROM 4-97 deg. 6/1/18  3. Patient will demonstrate left knee extension MMT 5/5 to improve ease with curb management. Eval: Left Knee Extension MMT = 2+/5  Current: Remains, Left Knee Extension MMT = 2+/5, 6/4/2018      Long Term Goals: To be accomplished in 6 weeks:  1.  Patient will demonstrate a significant functional improvement as demonstrated by a score of >/= 58 on FOTO. Eval: FOTO = 43  2. 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 Patient will demonstrate left SLS (airex) >/=30 seconds to improve ease with ambulation on uneven surfaces.   Eval: Right SLS (non-Airex) 30 sec / Left SLS (non-Airex) 30 sec     PLAN  [x]  Upgrade activities as tolerated     [x]  Continue plan of care  []  Update interventions per flow sheet       []  Discharge due to:_  []  Other:_      Carly Winston, PT 6/4/2018  6:55 AM    Future Appointments  Date Time Provider Luiza Petit   6/4/2018 8:00 AM Carly Winston, PT [facility]   6/7/2018 9:00 AM Tania Osuna PT [facility]   6/11/2018 7:30 AM Carly Winston, PT [facility]   6/13/2018 10:30 AM Maryruth Im, PTA [facility]   6/15/2018 10:20 AM Delmy Stapleton PA-C [facility]   6/20/2018 10:30 AM Maryruth Im, PTA [facility]   6/22/2018 10:00 AM Maryruth Im, PTA [facility]   6/27/2018 10:30 AM Maryruth Im, PTA [facility]   6/29/2018 10:00 AM Maryruth Im, P[facility]   7/3/2018 10:30 AM Maryruth Im, PTA [facility]   7/6/2018 9:30 AM Carly Winston, PT [facility]   7/11/2018 10:30 AM Ralph Canchola, GILDA [facility]   7/13/2018 10:00 AM Ralph Canchola PTA [facility]

## 2018-06-04 NOTE — TELEPHONE ENCOUNTER
Last Visit: 05/31/2018 with LAMAR Blackburn   Date of Surgery: 05/02/2018 left knee replacement  Next Appointment: 06/15/2018 with LAMAR Blackburn   Previous Refill Encounters: 05/17/2018 per LAMAR Langley #[id number]     Requested Prescriptions     Pending Prescriptions Disp Refills    oxyCODONE-acetaminophen (PERCOCET) 7.5-325 mg per tablet 40 Tab 0     Sig: Take 1 Tab by mouth every six (6) hours as needed for Pain. Max Daily Amount: 4 Tabs.

## 2018-06-05 ENCOUNTER — TELEPHONE (OUTPATIENT)
Dept: ORTHOPEDIC SURGERY | Facility: CLINIC | Age: 63
End: 2018-06-05

## 2018-06-05 DIAGNOSIS — G89.18 POST-OPERATIVE PAIN: Primary | ICD-10-CM

## 2018-06-05 RX ORDER — OXYCODONE AND ACETAMINOPHEN 7.5; 325 MG/1; MG/1
1-2 TABLET ORAL
Qty: 42 TAB | Refills: 0 | Status: SHIPPED | OUTPATIENT
Start: 2018-06-05

## 2018-06-05 NOTE — TELEPHONE ENCOUNTER
Patient called again wanting to know when she can  the Oxycodone medication. Patient said she can pick it up from any location. That she is willing to go to either [facility] or [facility].    Patient tel. [phone].

## 2018-06-05 NOTE — TELEPHONE ENCOUNTER
Post op Patient called and said that she was supposed to get a refill on Celebrex Medication also , but that the pharmacy told her that she can only get 30 pills until they hear from [address] , and that only last her for 15 days. Patient states she was supposed to get 60 pills of the Celebrex , but the pharmacy told her she could only get 30 pills. [facility] [address]. [phone] . Patient tel. [phone].

## 2018-06-05 NOTE — TELEPHONE ENCOUNTER
Rx pended in orders only encounter to print at [facility] [employer] for [address] [place] [address].

## 2018-06-06 ENCOUNTER — APPOINTMENT (OUTPATIENT)
Dept: PHYSICAL THERAPY | Age: 63
End: 2018-06-06
Payer: COMMERCIAL

## 2018-06-07 ENCOUNTER — HOSPITAL ENCOUNTER (OUTPATIENT)
Dept: PHYSICAL THERAPY | Age: 63
Discharge: HOME | End: 2018-06-07
Payer: COMMERCIAL

## 2018-06-07 PROCEDURE — 97112 NEUROMUSCULAR REEDUCATION: CPT | Performed by: PHYSICAL THERAPIST

## 2018-06-07 PROCEDURE — 97140 MANUAL THERAPY 1/> REGIONS: CPT | Performed by: PHYSICAL THERAPIST

## 2018-06-07 PROCEDURE — 97016 VASOPNEUMATIC DEVICE THERAPY: CPT | Performed by: PHYSICAL THERAPIST

## 2018-06-07 PROCEDURE — 97110 THERAPEUTIC EXERCISES: CPT | Performed by: PHYSICAL THERAPIST

## 2018-06-07 NOTE — PROGRESS NOTES
PT DAILY TREATMENT NOTE - [facility]     Patient Name: Chu Parcel  Date:2018  : 1955  [x]  Patient  Verified  Payor: BLUE CROSS / Plan: [facility] [place] / Product Type: PPO /    In time:902  Out time:1015  Total Treatment Time (min): 73  Total Timed Codes (min): 63  1:1 Treatment Time ( only): 40   Visit #: 6 of 12    Treatment Area: Left knee pain [M25.562]    SUBJECTIVE  Pain Level (0-10 scale): 5/10  Any medication changes, allergies to medications, adverse drug reactions, diagnosis change, or new procedure performed?: [x] No    [] Yes (see summary sheet for update)  Subjective functional status/changes:   [] No changes reported  Pt reports she has more stiffness today.      OBJECTIVE    Modality rationale: decrease edema, decrease inflammation and decrease pain to improve the patients ability to improve gait and activity tolerance    Min Type Additional Details    [] Estim:  []Unatt       []IFC  []Premod                        []Other:  []w/ice   []w/heat  Position:  Location:    [] Estim: []Att    []TENS instruct  []NMES                    []Other:  []w/US   []w/ice   []w/heat  Position:  Location:    []  Traction: [] Cervical       []Lumbar                       [] Prone          []Supine                       []Intermittent   []Continuous Lbs:  [] before manual  [] after manual    []  Ultrasound: []Continuous   [] Pulsed                           []1MHz   []3MHz W/cm2:  Location:    []  Iontophoresis with dexamethasone         Location: [] Take home patch   [] In clinic    []  Ice     []  heat  []  Ice massage  []  Laser   []  Anodyne Position:  Location:    []  Laser with stim  []  Other:  Position:  Location:   10 [x]  Vasopneumatic Device Pressure:       [x] lo [] med [] hi   Temperature: [x] lo [] med [] hi   [] Skin assessment post-treatment:  []intact []redness- no adverse reaction    []redness - adverse reaction:      min []Eval                  []Re-Eval       43 min Therapeutic Exercise:  [x] See flow sheet : increased per flow sheet    Rationale: increase ROM, increase strength and improve coordination to improve the patients ability to improve activity tolerance and mobility      min Therapeutic Activity:  []  See flow sheet :   Rationale:   to improve the patients ability to      10 min Neuromuscular Re-education:  [x]  See flow sheet : balance and quad activation ex's per flow sheet   Rationale: increase strength, improve coordination, improve balance and increase proprioception  to improve the patients ability to improve gait and decrease pain     10 min Manual Therapy:  Patellar mobs, knee flex and ext mobs, scar massage   Rationale: decrease pain, increase ROM, increase tissue extensibility and decrease edema  to improve gait and ADLs     min Gait Training:  ___ feet with ___ device on level surfaces with ___ level of assist   Rationale: With   [] TE   [] TA   [] neuro   [] other: Patient Education: [x] Review HEP    [] Progressed/Changed HEP based on:   [] positioning   [] body mechanics   [] transfers   [] heat/ice application    [] other:      Other Objective/Functional Measures: AROM after manual 5-96 deg     Pain Level (0-10 scale) post treatment: 3/10    ASSESSMENT/Changes in Function: Pt w/ concerns about scar tissue and regressing. Educated on surgery and to expect some bad days along with the good, encouraged to focus on the positives and progressions instead. Incision is well healed and scar tissue is minimal, reviewed scar tissue massage and reiterated that her incision is doing well.      Patient will continue to benefit from skilled PT services to modify and progress therapeutic interventions, address functional mobility deficits, address ROM deficits, address strength deficits, analyze and address soft tissue restrictions, analyze and cue movement patterns, address imbalance/dizziness and instruct in home and community integration to attain remaining goals. []  See Plan of Care  []  See progress note/recertification  []  See Discharge Summary         Progress towards goals / Updated goals:  Short Term Goals: To be accomplished in 3 weeks:  1. Patient will subjectively report full compliance with prescribed HEP. Eval: HEP provided  Current: not met, \"I was in to much pain. I didn't sleep for two nights. \" (5/25/2018)  2. Patient will demonstrate left knee AROM 0-120 degrees in order to improve ease with stair management. Eval: Left Knee AROM 0-10-83 degrees  Current: progressing: Left knee AROM 4-97 deg. 6/1/18  3. Patient will demonstrate left knee extension MMT 5/5 to improve ease with curb management. Eval: Left Knee Extension MMT = 2+/5  Current: Remains, Left Knee Extension MMT = 2+/5, 6/4/2018  Long Term Goals: To be accomplished in 6 weeks:  1. Patient will demonstrate a significant functional improvement as demonstrated by a score of >/= 58 on FOTO. Eval: FOTO = 43  Current: progressing, FOTO = 49 6/7/18  2. 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 Patient will demonstrate left SLS (airex) >/=30 seconds to improve ease with ambulation on uneven surfaces.   Eval: Right SLS (non-Airex) 30 sec / Left SLS (non-Airex) 30 sec     PLAN  [x]  Upgrade activities as tolerated     [x]  Continue plan of care  []  Update interventions per flow sheet       []  Discharge due to:_  []  Other:_      Dora Amaya, PT 6/7/2018  9:08 AM    Future Appointments  Date Time Provider Luiza Petit   6/11/2018 7:30 AM Omar Davenport PT [facility]   6/13/2018 10:30 AM Marylene Plain, PTA [facility]   6/15/2018 10:20 AM NIKOS Kimball 70   6/20/2018 10:30 AM Marylene Plain, PTA [facility]   6/22/2018 10:00 AM Boubacar Navarro Rafi Auguste, [place] [facility]   6/27/2018 10:30 AM Sukhdev Li, PTA [facility]   6/29/2018 10:00 AM Sukhdev Li, PTA [facility]   7/3/2018 10:30 AM Sukhdev Li, PTA [facility]   7/6/2018 9:30 AM Melinda Branch, PT [facility]   7/11/2018 10:30 AM Sukhdev Li, PTA [facility]   7/13/2018 10:00 AM Sukhdev Li, PTA [facility]

## 2018-06-08 ENCOUNTER — APPOINTMENT (OUTPATIENT)
Dept: PHYSICAL THERAPY | Age: 63
End: 2018-06-08
Payer: COMMERCIAL

## 2018-06-11 ENCOUNTER — HOSPITAL ENCOUNTER (OUTPATIENT)
Dept: PHYSICAL THERAPY | Age: 63
Discharge: HOME | End: 2018-06-11
Payer: COMMERCIAL

## 2018-06-11 PROCEDURE — 97110 THERAPEUTIC EXERCISES: CPT

## 2018-06-11 PROCEDURE — 97140 MANUAL THERAPY 1/> REGIONS: CPT

## 2018-06-11 PROCEDURE — 97016 VASOPNEUMATIC DEVICE THERAPY: CPT

## 2018-06-11 NOTE — PROGRESS NOTES
PT DAILY TREATMENT NOTE - [facility]     Patient Name: Estiven   Date:2018  : 1955  [x]  Patient  Verified  Payor: BLUE CROSS / Plan: [facility] [place] / Product Type: PPO /    In time:740  Out LAAS:8693  Total Treatment Time (min): 68  Total Timed Codes (min): 58  1:1 Treatment Time: 23   Visit #: 7 of 12    Treatment Area: Left knee pain [M25.562]    SUBJECTIVE  Pain Level (0-10 scale): 5  Any medication changes, allergies to medications, adverse drug reactions, diagnosis change, or new procedure performed?: [x] No    [] Yes (see summary sheet for update)  Subjective functional status/changes:   [] No changes reported  Patient reports provocation of pain yesterday evening without known reason with patient reporting increased ambulation over the weekend.     OBJECTIVE    Modality rationale: decrease inflammation and decrease pain to improve the patients ability to improve ease with sleep   Min Type Additional Details   10 [x]  Vasopneumatic Device Pressure:       [x] lo [] med [] hi   Temperature: [x] lo [] med [] hi     28 min Therapeutic Exercise:  [x] See flow sheet : Emphasis placed on improving available knee AROM and strength of the LE musculature   Rationale: increase ROM and increase strength to improve the patients ability to improve ease with stair management      20 min Neuromuscular Re-education:  [x]  See flow sheet : Emphasis placed on improving activation and recruitment of the quadriceps and gluteal musculature and improving LE proprioceptive and kinesthetic awareness   Rationale: increase ROM, increase strength, improve balance and increase proprioception  to improve the patients ability to improve safety with community ambulation.     10 min Manual Therapy:    Supine, Left Patellar Superior Grade III-IV Mobilization (OPP)  Supine, Left Patellar Superior Grade III Static Mobilization with SAQ MWM  Supine, Left Hamstring 90-90 Contract-Relax  Supine, Left Hamstring 90-90 STM  Supine, Left Femur AP Grade III-IV Mobilization   Rationale: decrease pain, increase ROM and increase tissue extensibility to improve ease with stair management. With   [] TE   [] TA   [] neuro   [] other: Patient Education: [x] Review HEP    [] Progressed/Changed HEP based on:   [] positioning   [] body mechanics   [] transfers   [] heat/ice application    [] other:      Pain Level (0-10 scale) post treatment: 4    ASSESSMENT/Changes in Function: Patient with gradual improvement in left knee AROM demonstrated with patient educated to apply heat to the posterior thigh, to reduce muscular hypertonicity, with observation to ensure no change in knee swelling. Patient educated to gradually return to walking program, to patient's tolerance. Patient will continue to benefit from skilled PT services to modify and progress therapeutic interventions, address functional mobility deficits, address ROM deficits, address strength deficits, analyze and address soft tissue restrictions, analyze and cue movement patterns, analyze and modify body mechanics/ergonomics, assess and modify postural abnormalities and address imbalance/dizziness to attain remaining goals. []  See Plan of Care  []  See progress note/recertification  []  See Discharge Summary         Progress towards goals / Updated goals:    Short Term Goals: To be accomplished in 3 weeks:  1. Patient will subjectively report full compliance with prescribed HEP. Eval: HEP provided  Current: Met, HEP performance reported as prescribed, 6/11/2018  2. Patient will demonstrate left knee AROM 0-120 degrees in order to improve ease with stair management. Eval: Left Knee AROM 0-10-83 degrees  Current: Progressing, Left Knee AROM 0-5-100 degrees, 6/11/2018  3. Patient will demonstrate left knee extension MMT 5/5 to improve ease with curb management.   Eval: Left Knee Extension MMT = 2+/5  Current: Remains, Left Knee Extension MMT = 2+/5, 6/4/2018  Long Term Goals: To be accomplished in 6 weeks:  1. Patient will demonstrate a significant functional improvement as demonstrated by a score of >/= 58 on FOTO. Eval: FOTO = 43  Current: Progressing, FOTO = 49 6/7/18  2. 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 Patient will demonstrate left SLS (airex) >/=30 seconds to improve ease with ambulation on uneven surfaces.   Eval: Right SLS (non-Airex) 30 sec / Left SLS (non-Airex) 30 sec     PLAN  [x]  Upgrade activities as tolerated     [x]  Continue plan of care  []  Update interventions per flow sheet       []  Discharge due to:_  []  Other:_      Keegan Ibarra PT 6/11/2018  6:50 AM    Future Appointments  Date Time Provider Luiza Reyesi   6/11/2018 7:30 AM Keegan Ibarra PT [facility]   6/13/2018 10:30 AM Kendra Wells, PTA [facility]   6/18/2018 1:00 PM LAMAR Kimbrough [place] [id number]   6/20/2018 10:30 AM Kendra Beldashawn, PTA [facility]   6/22/2018 10:00 AM Kendra Belch, PTA [facility]   6/27/2018 10:30 AM Kendra Belch, PTA [facility]   6/29/2018 10:00 AM Kendra Belch, PTA [facility]   7/3/2018 10:30 AM Kendra Priscilla, PTA [facility]   7/6/2018 9:30 AM Keegan Ibarra, PT [facility]   7/11/2018 10:30 AM Kendra Wells, PTA [facility]   7/13/2018 10:00 AM Kendra Wells PTA [facility]

## 2018-06-13 ENCOUNTER — HOSPITAL ENCOUNTER (OUTPATIENT)
Dept: PHYSICAL THERAPY | Age: 63
Discharge: HOME | End: 2018-06-13
Payer: COMMERCIAL

## 2018-06-13 PROCEDURE — 97110 THERAPEUTIC EXERCISES: CPT

## 2018-06-13 PROCEDURE — 97016 VASOPNEUMATIC DEVICE THERAPY: CPT

## 2018-06-13 PROCEDURE — 97140 MANUAL THERAPY 1/> REGIONS: CPT

## 2018-06-13 NOTE — PROGRESS NOTES
PT DAILY TREATMENT NOTE - [facility]     Patient Name: Joaquin Poole  Date:2018  : 1955  [x]  Patient  Verified  Payor: BLUE CROSS / Plan:  [facility] [place] / Product Type: PPO /    In time:10:40  Out time:11:55  Total Treatment Time (min): 75  Total Timed Codes (min): 65  1:1 Treatment Time ( only): 40   Visit #: 8 of 12    Treatment Area: Left knee pain [M25.562]    SUBJECTIVE  Pain Level (0-10 scale): 4  Any medication changes, allergies to medications, adverse drug reactions, diagnosis change, or new procedure performed?: [x] No    [] Yes (see summary sheet for update)  Subjective functional status/changes:   [] No changes reported  Pt reports that she took a hot shower yesterday and it caused her knee to swell a lot. OBJECTIVE    Modality rationale: decrease pain to improve the patients ability to decrease difficulty while performing tasks.     Min Type Additional Details    [] Estim:  []Unatt       []IFC  []Premod                        []Other:  []w/ice   []w/heat  Position:  Location:    [] Estim: []Att    []TENS instruct  []NMES                    []Other:  []w/US   []w/ice   []w/heat  Position:  Location:    []  Traction: [] Cervical       []Lumbar                       [] Prone          []Supine                       []Intermittent   []Continuous Lbs:  [] before manual  [] after manual    []  Ultrasound: []Continuous   [] Pulsed                           []1MHz   []3MHz W/cm2:  Location:    []  Iontophoresis with dexamethasone         Location: [] Take home patch   [] In clinic    []  Ice     []  heat  []  Ice massage  []  Laser   []  Anodyne Position:  Location:    []  Laser with stim  []  Other:  Position:  Location:   10 [x]  Vasopneumatic Device Pressure:       [x] lo [] med [] hi   Temperature: [x] lo [] med [] hi   [] Skin assessment post-treatment:  []intact []redness- no adverse reaction    []redness - adverse reaction:        55 min Therapeutic Exercise:  [x] See flow sheet :   Rationale: increase ROM and increase strength to improve the patients ability to increase tolerance to activities.      10 min Manual Therapy:  Patellar mobs, knee flexion and extension mobs, STM/TPR to quads and hamstrings, PROM   Rationale: decrease pain, increase ROM, increase tissue extensibility and decrease trigger points to increase ease with ADLs.           With   [] TE   [] TA   [] neuro   [] other: Patient Education: [x] Review HEP    [] Progressed/Changed HEP based on:   [] positioning   [] body mechanics   [] transfers   [] heat/ice application    [] other:      Other Objective/Functional Measures: pre manual 2-104 deg, post manual 0-110 deg      Pain Level (0-10 scale) post treatment: 4    ASSESSMENT/Changes in Function: Pt is progressing well with AROM of left knee. Pt is able to perform SLR with no extension lag. Patient will continue to benefit from skilled PT services to modify and progress therapeutic interventions, address functional mobility deficits, address ROM deficits, address strength deficits and analyze and address soft tissue restrictions to attain remaining goals. []  See Plan of Care  []  See progress note/recertification  []  See Discharge Summary         Progress towards goals / Updated goals:  Short Term Goals: To be accomplished in 3 weeks:  1. Patient will subjectively report full compliance with prescribed HEP. Eval: HEP provided  Current: Met, HEP performance reported as prescribed, 6/11/2018  2. Patient will demonstrate left knee AROM 0-120 degrees in order to improve ease with stair management. Eval: Left Knee AROM 0-10-83 degrees  Current: Progressing, Left Knee AROM 0-5-100 degrees, 6/11/2018  3. Patient will demonstrate left knee extension MMT 5/5 to improve ease with curb management. Eval: Left Knee Extension MMT = 2+/5  Current: Remains, Left Knee Extension MMT = 2+/5, 6/4/2018  Long Term Goals: To be accomplished in 6 weeks:  1.  Patient will demonstrate a significant functional improvement as demonstrated by a score of >/= 58 on FOTO. Eval: FOTO = 43  Current: Progressing, FOTO = 49 6/7/18  2. 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 Patient will demonstrate left SLS (airex) >/=30 seconds to improve ease with ambulation on uneven surfaces.   Eval: Right SLS (non-Airex) 30 sec / Left SLS (non-Airex) 30 sec     PLAN  []  Upgrade activities as tolerated     [x]  Continue plan of care  []  Update interventions per flow sheet       []  Discharge due to:_  []  Other:_      Bandar Diggs PTA 6/13/2018  10:42 AM    Future Appointments  Date Time Provider Luiza Reyesi   6/18/2018 1:00 PM LAMAR Sanchez 76   6/20/2018 10:30 AM Bandar Diggs PTA [facility]   6/22/2018 10:00 AM Bandar Diggs PTA [facility]   6/27/2018 10:30 AM Bandar Diggs PTA [facility]   6/29/2018 10:00 AM Bandar Diggs PTA [facility]   7/3/2018 10:30 AM Bandar Diggs PTA [facility]   7/6/2018 9:30 AM Corinna Knight, PT [facility]   7/11/2018 10:30 AM Bandar Diggs PTA [facility]   7/13/2018 10:00 AM Bandar Diggs PTA [facility]

## 2018-06-15 ENCOUNTER — APPOINTMENT (OUTPATIENT)
Dept: PHYSICAL THERAPY | Age: 63
End: 2018-06-15
Payer: COMMERCIAL

## 2018-06-18 ENCOUNTER — OFFICE VISIT (OUTPATIENT)
Dept: ORTHOPEDIC SURGERY | Age: 63
End: 2018-06-18

## 2018-06-18 VITALS
HEART RATE: 93 BPM | SYSTOLIC BLOOD PRESSURE: 146 MMHG | WEIGHT: 188 LBS | HEIGHT: 64 IN | BODY MASS INDEX: 32.1 KG/M2 | RESPIRATION RATE: 16 BRPM | OXYGEN SATURATION: 96 % | DIASTOLIC BLOOD PRESSURE: 73 MMHG | TEMPERATURE: 97.3 F

## 2018-06-18 DIAGNOSIS — Z96.652 STATUS POST TOTAL LEFT KNEE REPLACEMENT: Primary | ICD-10-CM

## 2018-06-18 DIAGNOSIS — G89.18 POST-OPERATIVE PAIN: ICD-10-CM

## 2018-06-18 RX ORDER — OXYCODONE AND ACETAMINOPHEN 5; 325 MG/1; MG/1
1 TABLET ORAL
Qty: 40 TAB | Refills: 0 | Status: SHIPPED | OUTPATIENT
Start: 2018-06-18

## 2018-06-18 NOTE — PROGRESS NOTES
Patient: Naren Graham  YOB: 1955       HISTORY:  The patient presents for reevaluation of her s/p left total knee arthroplasty on 5/2/18. Patient is improved, states pain is a 4 out of 10.  she has participated in outpt physical therapy. has been doing knee exercises at home. She is very happy with the results of her surgery so far. She has occasional night pain but otherwise is doing well. Patient denies any fever, chills, chest pain, shortness of breath or calf pain. There are no new illness or injuries to report since last seen in the office. PHYSICAL EXAMINATION:    Visit Vitals    /73    Pulse 93    Temp 97.3 °F (36.3 °C)    Resp 16    Ht 5' 4\" (1.626 m)    Wt 188 lb (85.3 kg)    SpO2 96%    BMI 32.27 kg/m2     The patient is a well-developed, well-nourished female in no acute distress. The patient is alert and oriented times three. The patient appears to be well groomed. Mood and affect are normal.   ORTHOPEDIC EXAM of Left knee: Inspection: Effusion present,  Incision well healed  TTP: none  Range of motion: -2-100 flexion  Stability: Stable   Strength: 5/5  2+ distal pulses  Peroneal nerve intact      XR: 3 views of the left knee show total knee components in good placement. No evidence for loosening or fracture. IMPRESSION:  Status post Left total knee arthroplasty. PLAN:  Pt doing well post operatively  Patient is weight bearing as tolerated. Talked about needing to work on hr ROM especially flexion  Will continue outpt physical therapy. Given new order today. Patient given a refill of pain medication. Percocet 5mg Patient given pain medication for short term acute pain relief. Goal is to treat patient according to above plan and to ultimately have patient off all pain medications once appropriate. If chronic pain management is required beyond what is expected for current orthopedic problem, will refer patient to pain management.   was reviewed and will be reviewed with every medication refill request.   Patient will follow up in 4 weeks.     [facility]

## 2018-06-19 ENCOUNTER — HOSPITAL ENCOUNTER (OUTPATIENT)
Dept: PHYSICAL THERAPY | Age: 63
Discharge: HOME | End: 2018-06-19
Payer: COMMERCIAL

## 2018-06-19 PROCEDURE — 97140 MANUAL THERAPY 1/> REGIONS: CPT

## 2018-06-19 PROCEDURE — 97110 THERAPEUTIC EXERCISES: CPT

## 2018-06-19 PROCEDURE — 97016 VASOPNEUMATIC DEVICE THERAPY: CPT

## 2018-06-19 NOTE — PROGRESS NOTES
PT DAILY TREATMENT NOTE - [facility]     Patient Name: Cleve Padilla  Date:2018  : 1955  [x]  Patient  Verified  Payor: BLUE CROSS / Plan: [employer] [facility] [place] / Product Type: PPO /    In time:10:07  Out time:11:14  Total Treatment Time (min): 67  Total Timed Codes (min): 57  1:1 Treatment Time ( only): 40   Visit #: 9 of 12    Treatment Area: Left knee pain [M25.562]    SUBJECTIVE  Pain Level (0-10 scale): 3  Any medication changes, allergies to medications, adverse drug reactions, diagnosis change, or new procedure performed?: [x] No    [] Yes (see summary sheet for update)  Subjective functional status/changes:   [] No changes reported  Pt reports that her knee was bothering her some this weekend. OBJECTIVE    Modality rationale: decrease pain to improve the patients ability to decrease difficulty while performing tasks.     Min Type Additional Details    [] Estim:  []Unatt       []IFC  []Premod                        []Other:  []w/ice   []w/heat  Position:  Location:    [] Estim: []Att    []TENS instruct  []NMES                    []Other:  []w/US   []w/ice   []w/heat  Position:  Location:    []  Traction: [] Cervical       []Lumbar                       [] Prone          []Supine                       []Intermittent   []Continuous Lbs:  [] before manual  [] after manual    []  Ultrasound: []Continuous   [] Pulsed                           []1MHz   []3MHz W/cm2:  Location:    []  Iontophoresis with dexamethasone         Location: [] Take home patch   [] In clinic    []  Ice     []  heat  []  Ice massage  []  Laser   []  Anodyne Position:  Location:    []  Laser with stim  []  Other:  Position:  Location:   10 [x]  Vasopneumatic Device Pressure:       [x] lo [] med [] hi   Temperature: [x] lo [] med [] hi   [] Skin assessment post-treatment:  []intact []redness- no adverse reaction    []redness - adverse reaction:     47 min Therapeutic Exercise:  [x] See flow sheet :   Rationale: increase ROM and increase strength to improve the patients ability to increase tolerance to activities.     10 min Manual Therapy:  Patellar mobs, knee flexion and extension mobs, STM/TPR to quads and hamstrings, PROM   Rationale: decrease pain, increase ROM, increase tissue extensibility and decrease trigger points to increase ease with ADLs.               With   [] TE   [] TA   [] neuro   [] other: Patient Education: [x] Review HEP    [] Progressed/Changed HEP based on:   [] positioning   [] body mechanics   [] transfers   [] heat/ice application    [] other:      Other Objective/Functional Measures: Left Knee AROM 0-106 degrees post manual,      Pain Level (0-10 scale) post treatment: 0    ASSESSMENT/Changes in Function: After manual pt reported sharp pain in posterior knee. Pt only had pain with heel slides exercises, no standing or other exercises bothered her. Patient will continue to benefit from skilled PT services to modify and progress therapeutic interventions, address functional mobility deficits, address ROM deficits, address strength deficits and analyze and address soft tissue restrictions to attain remaining goals. []  See Plan of Care  []  See progress note/recertification  []  See Discharge Summary         Progress towards goals / Updated goals:  Short Term Goals: To be accomplished in 3 weeks:  1. Patient will subjectively report full compliance with prescribed HEP. Eval: HEP provided  Current: Met, HEP performance reported as prescribed, 6/11/2018  2. Patient will demonstrate left knee AROM 0-120 degrees in order to improve ease with stair management. Eval: Left Knee AROM 0-10-83 degrees  Current: Progressing, Left Knee AROM 0-106 degrees post manual, 6/19/2018  3. Patient will demonstrate left knee extension MMT 5/5 to improve ease with curb management.   Eval: Left Knee Extension MMT = 2+/5  Current: Remains, Left Knee Extension MMT = 2+/5, 6/4/2018  Long Term Goals: To be accomplished in 6 weeks:  1. Patient will demonstrate a significant functional improvement as demonstrated by a score of >/= 58 on FOTO. Eval: FOTO = 43  Current: Progressing, FOTO = 49 6/7/18  2. 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 Patient will demonstrate left SLS (airex) >/=30 seconds to improve ease with ambulation on uneven surfaces.   Eval: Right SLS (non-Airex) 30 sec / Left SLS (non-Airex) 30 sec        PLAN  []  Upgrade activities as tolerated     [x]  Continue plan of care  []  Update interventions per flow sheet       []  Discharge due to:_  []  Other:_      Mitcheal Drown, PTA 6/19/2018  10:28 AM    Future Appointments  Date Time Provider Luiza Petit   6/21/2018 1:00 PM Mitcheal Drown, PTA [facility]   6/27/2018 10:30 AM Mitcheal Drown, PTA [facility]   6/29/2018 10:00 AM Mitcheal Drown, PTA [facility]   7/3/2018 10:30 AM Mitcheal Drown, PTA [facility]   7/6/2018 9:30 AM Jose Caceres, PT [facility]   7/11/2018 10:30 AM Mitcheal Drown, PTA [facility]   7/13/2018 10:00 AM Mitcheal Drown, PTA [facility]   7/17/2018 9:00 AM LAMAR Church 70

## 2018-06-20 ENCOUNTER — APPOINTMENT (OUTPATIENT)
Dept: PHYSICAL THERAPY | Age: 63
End: 2018-06-20
Payer: COMMERCIAL

## 2018-06-21 ENCOUNTER — HOSPITAL ENCOUNTER (OUTPATIENT)
Dept: PHYSICAL THERAPY | Age: 63
Discharge: HOME | End: 2018-06-21
Payer: COMMERCIAL

## 2018-06-21 PROCEDURE — 97016 VASOPNEUMATIC DEVICE THERAPY: CPT

## 2018-06-21 PROCEDURE — 97110 THERAPEUTIC EXERCISES: CPT

## 2018-06-21 PROCEDURE — 97140 MANUAL THERAPY 1/> REGIONS: CPT

## 2018-06-21 NOTE — PROGRESS NOTES
[facility] - [facility]              [address]        [place], [address]   [phone] fax    Progress Note  Patient name: Kofi Anderson Start of Care: 2018   Referral source: Zhanna Romero MD : [id number]   Medical/Treatment Diagnosis: Left knee pain [M25.562] Onset Date:DoS 2018     Prior Hospitalization: see medical history Provider#: [id number]   Medications: Verified on Patient Summary List    Comorbidities: Arthritis, BMI>30   Prior Level of Function: Ambulation without AD, Work full-time, (I) Functional ADLs, (I) Driving  Visits from Start of Care: 10    Missed Visits: 0    Established Goals:            Short Term Goals: To be accomplished in 3 weeks:  1. Patient will subjectively report full compliance with prescribed HEP. Eval: HEP provided  At PN: Met, HEP performance reported as prescribed  2. Patient will demonstrate left knee AROM 0-120 degrees in order to improve ease with stair management. Eval: Left Knee AROM 0-10-83 degrees  At PN: Progressing, Left Knee AROM 0-106 degrees post manual  3. Patient will demonstrate left knee extension MMT 5/5 to improve ease with curb management. Eval: Left Knee Extension MMT = 2+/5  At PN: Progressing, Left Knee Extension MMT = 3/5    Long Term Goals: To be accomplished in 6 weeks:  1. Patient will demonstrate a significant functional improvement as demonstrated by a score of >/= 58 on FOTO. Eval: FOTO = 43  At PN: Progressing, FOTO = 49   2. 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 Patient will demonstrate left SLS (airex) >/=30 seconds to improve ease with ambulation on uneven surfaces.   Eval: Right SLS (non-Airex) 30 sec / Left SLS (non-Airex) 30 sec  At PN: Goal met: SLS B on airex 30 sec without HHA    Key Functional Changes: See above goals. Updated  Continue with unmet goals above. ASSESSMENT/RECOMMENDATIONS:    Pt has progressed well toward LTGs. Pt has progressed SLS B on airex for 30 sec without HHA. Pt is able to perform 15 sit to stand with UE assist in 30 seconds. Pt's AROM is 0-106 deg. Pt continues to lack hip extension strength.      Patient will continue to benefit from skilled PT services to modify and progress therapeutic interventions, address functional mobility deficits, address ROM deficits, address strength deficits and analyze and address soft tissue restrictions to attain remaining goals. [x]Continue therapy per initial plan/protocol at a frequency of  2 x per week for 6 weeks  []Continue therapy with the following recommended changes:_____________________      _____________________________________________________________________  []Discontinue therapy progressing towards or have reached established goals  []Discontinue therapy due to lack of appreciable progress towards goals  []Discontinue therapy due to lack of attendance or compliance  []Await Physician's recommendations/decisions regarding therapy  []Other:________________________________________________________________    Thank you for this referral.    Chuck Brunner, PT 6/21/2018 2:14 PM  NOTE TO PHYSICIAN:  PLEASE COMPLETE THE ORDERS BELOW AND   FAX TO [facility]: [phone]  If you are unable to process this request in 24 hours please contact our office: [phone]    []  I have read the above report and request that my patient continue as recommended. []  I have read the above report and request that my patient continue therapy with the following changes/special instructions:________________________________________  []I have read the above report and request that my patient be discharged from therapy.     [phone] signature: ________________________________Date: _____Time:_____

## 2018-06-21 NOTE — PROGRESS NOTES
PT DAILY TREATMENT NOTE - [facility]     Patient Name: Leon Lee  Date:2018  : 1955  [x]  Patient  Verified  Payor: BLUE CROSS / Plan: [employer] [facility] [place] / Product Type: PPO /    In time:1:05  Out time:2:07  Total Treatment Time (min): 62  Total Timed Codes (min): 52  1:1 Treatment Time ( only): 40   Visit #:  of 18 (new script)    Treatment Area: Left knee pain [M25.562]    SUBJECTIVE  Pain Level (0-10 scale): 6  Any medication changes, allergies to medications, adverse drug reactions, diagnosis change, or new procedure performed?: [x] No    [] Yes (see summary sheet for update)  Subjective functional status/changes:   [] No changes reported  Pt reports that she feels her knee stiffness up as soon as she stops doing her stretches at home. OBJECTIVE    Modality rationale: decrease pain to improve the patients ability to decrease difficulty while performing tasks.     Min Type Additional Details    [] Estim:  []Unatt       []IFC  []Premod                        []Other:  []w/ice   []w/heat  Position:  Location:    [] Estim: []Att    []TENS instruct  []NMES                    []Other:  []w/US   []w/ice   []w/heat  Position:  Location:    []  Traction: [] Cervical       []Lumbar                       [] Prone          []Supine                       []Intermittent   []Continuous Lbs:  [] before manual  [] after manual    []  Ultrasound: []Continuous   [] Pulsed                           []1MHz   []3MHz W/cm2:  Location:    []  Iontophoresis with dexamethasone         Location: [] Take home patch   [] In clinic    []  Ice     []  heat  []  Ice massage  []  Laser   []  Anodyne Position:  Location:    []  Laser with stim  []  Other:  Position:  Location:   10 [x]  Vasopneumatic Device Pressure:       [x] lo [] med [] hi   Temperature: [x] lo [] med [] hi   [] Skin assessment post-treatment:  []intact []redness- no adverse reaction    []redness - adverse reaction:     44 min Therapeutic Exercise:  [x] See flow sheet :   Rationale: increase ROM and increase strength to improve the patients ability to increase tolerance to activities.     8 min Manual Therapy:  Patellar mobs, knee flexion and extension mobs, STM/TPR to quads and hamstrings, PROM   Rationale: decrease pain, increase ROM, increase tissue extensibility and decrease trigger points to increase ease with ADLs.                  With   [] TE   [] TA   [] neuro   [] other: Patient Education: [x] Review HEP    [] Progressed/Changed HEP based on:   [] positioning   [] body mechanics   [] transfers   [] heat/ice application    [] other:      Other Objective/Functional Measures:see goals     Pain Level (0-10 scale) post treatment: 5    ASSESSMENT/Changes in Function: Pt has progressed well toward LTGs. Pt has progressed SLS B on airex for 30 sec without HHA. Pt is able to perform 15 sit to stand with UE assist in 30 seconds. Pt's AROM is 0-106 deg. Pt continues to lack hip extension strength. Patient will continue to benefit from skilled PT services to modify and progress therapeutic interventions, address functional mobility deficits, address ROM deficits, address strength deficits and analyze and address soft tissue restrictions to attain remaining goals. []  See Plan of Care  []  See progress note/recertification  []  See Discharge Summary         Progress towards goals / Updated goals:  Short Term Goals: To be accomplished in 3 weeks:  1. Patient will subjectively report full compliance with prescribed HEP. Eval: HEP provided  Current: Met, HEP performance reported as prescribed, 6/11/2018  2. Patient will demonstrate left knee AROM 0-120 degrees in order to improve ease with stair management. Eval: Left Knee AROM 0-10-83 degrees  Current: Progressing, Left Knee AROM 0-106 degrees post manual, 6/19/2018  3. Patient will demonstrate left knee extension MMT 5/5 to improve ease with curb management.   Eval: Left Knee Extension MMT = 2+/5  Current: Progressing, Left Knee Extension MMT = 3/5, 6/21/2018  Long Term Goals: To be accomplished in 6 weeks:  1. Patient will demonstrate a significant functional improvement as demonstrated by a score of >/= 58 on FOTO. Eval: FOTO = 43  Current: Progressing, FOTO = 49 6/7/18  2. 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 6/21/18  3. Patient will demonstrate left SLS (airex) >/=30 seconds to improve ease with ambulation on uneven surfaces. Eval: Right SLS (non-Airex) 30 sec / Left SLS (non-Airex) 30 sec  Current: Goal met: SLS B on airex 30 sec without HHA.  6/21/18        PLAN  []  Upgrade activities as tolerated     [x]  Continue plan of care  []  Update interventions per flow sheet       []  Discharge due to:_  []  Other:_      Chana Diggs PTA 6/21/2018  1:07 PM    Future Appointments  Date Time Provider Luiza Petit   6/27/2018 10:30 AM Chana Diggs PTA [facility] [id number]   6/29/2018 10:00 AM Chana Diggs PTA [facility] [address]   7/3/2018 10:30 AM Chana Diggs PTA [facility] [address]   7/6/2018 9:30 AM Willy Moore PT [facility] [address]   7/11/2018 10:30 AM Chana Diggs PTA [facility] [address]   7/13/2018 10:00 AM Chana Diggs PTA [facility] [address]   7/17/2018 9:00 AM LAMAR Barry

## 2018-06-22 ENCOUNTER — APPOINTMENT (OUTPATIENT)
Dept: PHYSICAL THERAPY | Age: 63
End: 2018-06-22
Payer: COMMERCIAL

## 2018-06-27 ENCOUNTER — APPOINTMENT (OUTPATIENT)
Dept: PHYSICAL THERAPY | Age: 63
End: 2018-06-27
Payer: COMMERCIAL

## 2018-06-29 ENCOUNTER — HOSPITAL ENCOUNTER (OUTPATIENT)
Dept: PHYSICAL THERAPY | Age: 63
Discharge: HOME | End: 2018-06-29
Payer: COMMERCIAL

## 2018-06-29 PROCEDURE — 97110 THERAPEUTIC EXERCISES: CPT

## 2018-06-29 PROCEDURE — 97140 MANUAL THERAPY 1/> REGIONS: CPT

## 2018-06-29 NOTE — PROGRESS NOTES
PT DAILY TREATMENT NOTE - [facility]     Patient Name: Romi Cruz  Date:2018  : 1955  [x]  Patient  Verified  Payor: BLUE CROSS / Plan: [employer] [facility] [place] / Product Type: PPO /    In time:10:08  Out time:11:15  Total Treatment Time (min): 67  Total Timed Codes (min): 57  1:1 Treatment Time ( only): 40   Visit #: 2 of 18    Treatment Area: Left knee pain [M25.562]    SUBJECTIVE  Pain Level (0-10 scale): 4.5  Any medication changes, allergies to medications, adverse drug reactions, diagnosis change, or new procedure performed?: [x] No    [] Yes (see summary sheet for update)  Subjective functional status/changes:   [] No changes reported  Pt reports that her knee is feeling stiff today. OBJECTIVE    Modality rationale: decrease pain to improve the patients ability to decrease difficulty while performing tasks.     Min Type Additional Details    [] Estim:  []Unatt       []IFC  []Premod                        []Other:  []w/ice   []w/heat  Position:  Location:    [] Estim: []Att    []TENS instruct  []NMES                    []Other:  []w/US   []w/ice   []w/heat  Position:  Location:    []  Traction: [] Cervical       []Lumbar                       [] Prone          []Supine                       []Intermittent   []Continuous Lbs:  [] before manual  [] after manual    []  Ultrasound: []Continuous   [] Pulsed                           []1MHz   []3MHz W/cm2:  Location:    []  Iontophoresis with dexamethasone         Location: [] Take home patch   [] In clinic   10 [x]  Ice     []  heat  []  Ice massage  []  Laser   []  Anodyne Position:sitting  Location:left knee    []  Laser with stim  []  Other:  Position:  Location:    []  Vasopneumatic Device Pressure:       [] lo [] med [] hi   Temperature: [] lo [] med [] hi   [] Skin assessment post-treatment:  []intact []redness- no adverse reaction    []redness - adverse reaction:       49 min Therapeutic Exercise:  [x] See flow sheet :   Rationale: increase ROM and increase strength to improve the patients ability to increase tolerance to activities.     8 min Manual Therapy:  Patellar mobs, knee flexion and extension mobs, STM/TPR to quads and ITband, PROM   Rationale: decrease pain, increase ROM, increase tissue extensibility and decrease trigger points to increase ease with ADLs.               With   [] TE   [] TA   [] neuro   [] other: Patient Education: [x] Review HEP    [] Progressed/Changed HEP based on:   [] positioning   [] body mechanics   [] transfers   [] heat/ice application    [] other:      Other Objective/Functional Measures: pre manual 0-106 deg. Pain Level (0-10 scale) post treatment: 5    ASSESSMENT/Changes in Function: Educated pt on importance of performing HEP 3x a day. Told pt to focus on knee flexion with HEP. Patient will continue to benefit from skilled PT services to modify and progress therapeutic interventions, address functional mobility deficits, address ROM deficits, address strength deficits and analyze and address soft tissue restrictions to attain remaining goals. []  See Plan of Care  []  See progress note/recertification  []  See Discharge Summary         Progress towards goals / Updated goals:  Short Term Goals: To be accomplished in 3 weeks:  1. Patient will subjectively report full compliance with prescribed HEP. Eval: HEP provided  Current: Met, HEP performance reported as prescribed, 6/11/2018  2. Patient will demonstrate left knee AROM 0-120 degrees in order to improve ease with stair management. Eval: Left Knee AROM 0-10-83 degrees  Current: Progressing, Left Knee AROM 0-106 degrees post manual, 6/19/2018  3. Patient will demonstrate left knee extension MMT 5/5 to improve ease with curb management. Eval: Left Knee Extension MMT = 2+/5  Current: Progressing, Left Knee Extension MMT = 3/5, 6/21/2018  Long Term Goals: To be accomplished in 6 weeks:  1.  Patient will demonstrate a significant functional improvement as demonstrated by a score of >/= 58 on FOTO. Eval: FOTO = 43  Current: Progressing, FOTO = 49 6/7/18  2. 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 6/21/18  3. Patient will demonstrate left SLS (airex) >/=30 seconds to improve ease with ambulation on uneven surfaces. Eval: Right SLS (non-Airex) 30 sec / Left SLS (non-Airex) 30 sec  Current: Goal met: SLS B on airex 30 sec without HHA.  6/21/18     PLAN  []  Upgrade activities as tolerated     [x]  Continue plan of care  []  Update interventions per flow sheet       []  Discharge due to:_  []  Other:_      Stephen Henriquez PTA 6/29/2018  10:13 AM    Future Appointments  Date Time Provider Luiza Petit   7/3/2018 10:30 AM Stephen Henriquez PTA [facility]   7/6/2018 9:30 AM Brielle Bates PT [facility]   7/11/2018 10:30 AM Stephen Henriquez PTA [facility]   7/13/2018 10:00 AM Stephen Henriquez PTA [facility]   7/17/2018 9:00 AM LAMAR Hurst 70

## 2018-07-03 ENCOUNTER — APPOINTMENT (OUTPATIENT)
Dept: PHYSICAL THERAPY | Age: 63
End: 2018-07-03
Payer: COMMERCIAL

## 2018-07-03 ENCOUNTER — TELEPHONE (OUTPATIENT)
Dept: ORTHOPEDIC SURGERY | Facility: CLINIC | Age: 63
End: 2018-07-03

## 2018-07-03 ENCOUNTER — OFFICE VISIT (OUTPATIENT)
Dept: ORTHOPEDIC SURGERY | Facility: CLINIC | Age: 63
End: 2018-07-03

## 2018-07-03 VITALS
HEIGHT: 64 IN | WEIGHT: 182.4 LBS | BODY MASS INDEX: 31.14 KG/M2 | HEART RATE: 96 BPM | RESPIRATION RATE: 18 BRPM | OXYGEN SATURATION: 99 % | DIASTOLIC BLOOD PRESSURE: 69 MMHG | SYSTOLIC BLOOD PRESSURE: 116 MMHG | TEMPERATURE: 97.7 F

## 2018-07-03 DIAGNOSIS — Z96.652 STATUS POST TOTAL LEFT KNEE REPLACEMENT: Primary | ICD-10-CM

## 2018-07-03 DIAGNOSIS — G89.18 POST-OPERATIVE PAIN: ICD-10-CM

## 2018-07-03 RX ORDER — OXYCODONE AND ACETAMINOPHEN 5; 325 MG/1; MG/1
1 TABLET ORAL
Qty: 21 TAB | Refills: 0 | Status: SHIPPED | OUTPATIENT
Start: 2018-07-03

## 2018-07-03 NOTE — PROGRESS NOTES
Patient: Saba Cain                MRN: [id number]       SSN: [id number]  YOB: 1955        AGE: 59 y.o. SEX: female  Body mass index is 31.31 kg/(m^2). PCP: Gerhardt Johann, MD  07/03/18    HISTORY: Rosalia Saleem is about eight weeks following total knee replacement. She had a very severe valgus deformity. She feels the knee feels a little bit stiff and tight. PHYSICAL EXAMINATION:  On examination today, the calf is nontender. She is actually bending her knee quite nicely to about an easy 110° or so. Extension-wise she is about -3° to -4°, and I think the issue is she has not been sitting with the leg out straight often enough. We would like 10 times a day for about 10 minutes, and I demonstrated in front of her significant other how to do this as well. The wound looks good. There is no evidence for infection or DVT. The calf is nontender. Yoshi's sign is negative. There is no shortness of breath noted either. RADIOGRAPHS:  X-rays show the implants are beautifully well-aligned and well-fixed. PLAN:  Essentially, to bend and straighten the knee more frequently, I think, will be helpful for her. She will continue Celebrex. A prescription was written. She will continue therapy. We will see her back in three weeks time. We will be checking on the straightness of the knee. REVIEW OF SYSTEMS:      CON: negative for weight loss, fever  EYE: negative for double vision  ENT: negative for hoarseness  RS:   negative for Tb  GI:    negative for blood in stool  :  negative for blood in urine  Other systems reviewed and noted below. Past Medical History:   Diagnosis Date    Arthritis        History reviewed. No pertinent family history. Current Outpatient Prescriptions   Medication Sig Dispense Refill    oxyCODONE-acetaminophen (PERCOCET) 5-325 mg per tablet Take 1 Tab by mouth every eight (8) hours as needed for Pain (Hold if sedated. ).  Max Daily Amount: 3 Tabs. 21 Tab 0    oxyCODONE-acetaminophen (PERCOCET) 7.5-325 mg per tablet Take 1 Tab by mouth every six (6) hours as needed for Pain. Max Daily Amount: 4 Tabs. 40 Tab 0    celecoxib (CELEBREX) 200 mg capsule Take 1 Cap by mouth two (2) times a day for 90 days. 60 Cap 2    ferrous sulfate 325 mg (65 mg iron) tablet Take 1 Tab by mouth two (2) times daily (with meals). 60 Tab 2    oxyCODONE-acetaminophen (PERCOCET) 5-325 mg per tablet Take 1 Tab by mouth every eight (8) hours as needed for Pain. Max Daily Amount: 3 Tabs. 40 Tab 0    oxyCODONE-acetaminophen (PERCOCET) 7.5-325 mg per tablet Take 1-2 Tabs by mouth every eight (8) hours as needed for Pain. Max Daily Amount: 6 Tabs. 42 Tab 0    polyethylene glycol (MIRALAX) 17 gram/dose powder Take 17 g by mouth daily.  buffered aspirin (BUFFERIN) 325 mg tablet Take 1 Tab by mouth two (2) times a day. 60 Tab 0    oxyCODONE-acetaminophen (PERCOCET)  mg per tablet Take 1-2 Tabs by mouth every four (4) hours as needed for Pain. Max Daily Amount: 12 Tabs. 60 Tab 0    VITAMIN D2 50,000 unit capsule Take 50,000 Units by mouth every Monday, Wednesday, Friday.  0       No Known Allergies    Past Surgical History:   Procedure Laterality Date    HX COLONOSCOPY  2009    HX HEMORRHOIDECTOMY  2016    HX HYSTERECTOMY         Social History     Social History    Marital status:      Spouse name: N/A    Number of children: N/A    Years of education: N/A     Occupational History    Not on file.      Social History Main Topics    Smoking status: Former Smoker    Smokeless tobacco: Never Used    Alcohol use No    Drug use: No    Sexual activity: Not on file     Other Topics Concern    Not on file     Social History Narrative       Visit Vitals    /69    Pulse 96    Temp 97.7 °F (36.5 °C) (Oral)    Resp 18    Ht 5' 4\" (1.626 m)    Wt 182 lb 6.4 oz (82.7 kg)    SpO2 99%    BMI 31.31 kg/m2         PHYSICAL EXAMINATION:  GENERAL: Alert and oriented x3, in no acute distress, well-developed, well-nourished, afebrile. HEART: No JVD. EYES: No scleral icterus   NECK: No significant lymphadenopathy   LUNGS: No respiratory compromise or indrawing  ABDOMEN: Soft, non-tender, non-distended. Electronically signed by:  Dora Rowland MD

## 2018-07-03 NOTE — TELEPHONE ENCOUNTER
Pt states she needs a letter for her work for when she can return back to work. Please advise pt at [phone].

## 2018-07-06 ENCOUNTER — HOSPITAL ENCOUNTER (OUTPATIENT)
Dept: PHYSICAL THERAPY | Age: 63
Discharge: HOME | End: 2018-07-06
Payer: COMMERCIAL

## 2018-07-06 PROCEDURE — 97016 VASOPNEUMATIC DEVICE THERAPY: CPT

## 2018-07-06 PROCEDURE — 97110 THERAPEUTIC EXERCISES: CPT

## 2018-07-06 PROCEDURE — 97140 MANUAL THERAPY 1/> REGIONS: CPT

## 2018-07-06 NOTE — PROGRESS NOTES
PT DAILY TREATMENT NOTE - [facility]     Patient Name: Kike Loza  Date:2018  : 1955  [x]  Patient  Verified  Payor: BLUE CROSS / Plan: [facility] / Product Type: PPO /    In MIDS:[id number]  Out time:1041  Total Treatment Time (min): 66  Total Timed Codes (min): 64  Therapist Time: 25   Visit #: 3 of 18    Treatment Area: Left knee pain [M25.562]    SUBJECTIVE  Pain Level (0-10 scale): 4  Any medication changes, allergies to medications, adverse drug reactions, diagnosis change, or new procedure performed?: [x] No    [] Yes (see summary sheet for update)  Subjective functional status/changes:   [] No changes reported  Patient reports MD follow up with MD confirming that patient progressing appropriately post-surgically.     OBJECTIVE    Modality rationale: decrease inflammation and decrease pain to improve the patients ability to improve ease with sleep   Min Type Additional Details   10 [x]  Vasopneumatic Device Pressure:       [x] lo [] med [] hi   Temperature: [x] lo [] med [] hi     28 min Therapeutic Exercise:  [x] See flow sheet : Emphasis placed on improving available knee AROM and strength of the LE musculature   Rationale: increase ROM and increase strength to improve the patients ability to improve ease with stair management      20 min Neuromuscular Re-education:  [x]  See flow sheet : Emphasis placed on improving activation and recruitment of the quadriceps and gluteal musculature and improving LE proprioceptive and kinesthetic awareness   Rationale: increase ROM, increase strength, improve balance and increase proprioception  to improve the patients ability to improve safety with community ambulation.      8 min Manual Therapy:    Supine, Left Patellar Superior Grade III-IV Mobilization (OPP)  Supine, Left Femur AP Grade III-IV Mobilization  Supine, Manually Resisted Knee TKE   Rationale: decrease pain, increase ROM and increase tissue extensibility to improve ease with stair management. With   [] TE   [] TA   [] neuro   [] other: Patient Education: [x] Review HEP    [] Progressed/Changed HEP based on:   [] positioning   [] body mechanics   [] transfers   [] heat/ice application    [] other:      Pain Level (0-10 scale) post treatment: 5    ASSESSMENT/Changes in Function: Patient continues to demonstrate fear regarding progression with patient with belief that she is not progressing as required therefore patient benefits from reassurance regarding post-surgical progression and educated regarding the importance of continued full compliance with HEP and within clinic treatment. Patient will continue to benefit from skilled PT services to modify and progress therapeutic interventions, address functional mobility deficits, address ROM deficits, address strength deficits, analyze and address soft tissue restrictions, analyze and cue movement patterns, analyze and modify body mechanics/ergonomics and assess and modify postural abnormalities to attain remaining goals. []  See Plan of Care  []  See progress note/recertification  []  See Discharge Summary         Progress towards goals / Updated goals:    Short Term Goals: To be accomplished in 3 weeks:  1. Patient will subjectively report full compliance with prescribed HEP. Eval: HEP provided  Current: Met, HEP performance reported as prescribed, 6/11/2018  2. Patient will demonstrate left knee AROM 0-120 degrees in order to improve ease with stair management. Eval: Left Knee AROM 0-10-83 degrees  Current: Progressing, Left Knee AROM 0-8-107 degrees post manual, 7/6/2018  3. Patient will demonstrate left knee extension MMT 5/5 to improve ease with curb management. Eval: Left Knee Extension MMT = 2+/5  Current: Progressing, Left Knee Extension MMT = 3/5, 6/21/2018  Long Term Goals: To be accomplished in 6 weeks:  1.  Patient will demonstrate a significant functional improvement as demonstrated by a score of >/= 58 on FOTO.  Eval: FOTO = 43  Current: Progressing, FOTO = 53, 7/6/2018  2. 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 6/21/18  3. Patient will demonstrate left SLS (airex) >/=30 seconds to improve ease with ambulation on uneven surfaces. Eval: Right SLS (non-Airex) 30 sec / Left SLS (non-Airex) 30 sec  Current: Goal met: SLS B on airex 30 sec without HHA.  6/21/18     PLAN  [x]  Upgrade activities as tolerated     [x]  Continue plan of care  []  Update interventions per flow sheet       []  Discharge due to:_  []  Other:_      Andra Bass PT 7/6/2018  6:54 AM    Future Appointments  Date Time Provider Luiza Reyesi   7/6/2018 9:30 AM Andra Bass PT [facility]   7/11/2018 10:30 AM Sindy Garcia PTA [facility]   7/13/2018 10:00 AM Sindy Garcia PTA [facility]   7/17/2018 9:00 AM LAMAR Ugarte [facility]   8/1/2018 9:15 AM [facility]

## 2018-07-10 ENCOUNTER — TELEPHONE (OUTPATIENT)
Dept: ORTHOPEDIC SURGERY | Age: 63
End: 2018-07-10

## 2018-07-10 NOTE — TELEPHONE ENCOUNTER
Pt wants to discuss date to return to work   States her return to work date is not correct.   Pt p#[phone]

## 2018-07-10 NOTE — TELEPHONE ENCOUNTER
Called and spoke to patient. I advised the return to work date is based on her next OV with Dr Prado Done. Patient then inquired about her [place] paperwork. I advised it was filled out in April.  Patient states she will  the letter at the [facility] office on 7/11

## 2018-07-11 ENCOUNTER — HOSPITAL ENCOUNTER (OUTPATIENT)
Dept: PHYSICAL THERAPY | Age: 63
Discharge: HOME | End: 2018-07-11
Payer: COMMERCIAL

## 2018-07-11 PROCEDURE — 97110 THERAPEUTIC EXERCISES: CPT

## 2018-07-11 PROCEDURE — 97112 NEUROMUSCULAR REEDUCATION: CPT

## 2018-07-11 NOTE — PROGRESS NOTES
PT DAILY TREATMENT NOTE - [facility]     Patient Name: Val Li  Date:2018  : 1955  [x]  Patient  Verified  Payor: BLUE CROSS / Plan:  [facility] / Product Type: PPO /    In time:10:32  Out time:11:35  Total Treatment Time (min): 63  Total Timed Codes (min): 53  1:1 Treatment Time ( only): 48   Visit #: 4 of 18    Treatment Area: Left knee pain [M25.562]    SUBJECTIVE  Pain Level (0-10 scale): 5  Any medication changes, allergies to medications, adverse drug reactions, diagnosis change, or new procedure performed?: [x] No    [] Yes (see summary sheet for update)  Subjective functional status/changes:   [] No changes reported  Pt reports that her knee feels stiff a lot throughout the day. OBJECTIVE    Modality rationale: decrease pain to improve the patients ability to decrease difficulty while performing tasks.     Min Type Additional Details    [] Estim:  []Unatt       []IFC  []Premod                        []Other:  []w/ice   []w/heat  Position:  Location:    [] Estim: []Att    []TENS instruct  []NMES                    []Other:  []w/US   []w/ice   []w/heat  Position:  Location:    []  Traction: [] Cervical       []Lumbar                       [] Prone          []Supine                       []Intermittent   []Continuous Lbs:  [] before manual  [] after manual    []  Ultrasound: []Continuous   [] Pulsed                           []1MHz   []3MHz W/cm2:  Location:    []  Iontophoresis with dexamethasone         Location: [] Take home patch   [] In clinic   10 [x]  Ice     []  heat  []  Ice massage  []  Laser   []  Anodyne Position:sitting  Location:left knee    []  Laser with stim  []  Other:  Position:  Location:    []  Vasopneumatic Device Pressure:       [] lo [] med [] hi   Temperature: [] lo [] med [] hi   [] Skin assessment post-treatment:  []intact []redness- no adverse reaction    []redness - adverse reaction:       38 min Therapeutic Exercise:  [x] See flow sheet : Rationale: increase ROM and increase strength to improve the patients ability to increase tolerance to activities.     15 min Neuromuscular Re-education:  [x]  See flow sheet : glut and quad activation. Rationale: increase ROM, increase strength, improve balance and increase proprioception  to improve the patients ability to improve safety with community ambulation. With   [] TE   [] TA   [] neuro   [] other: Patient Education: [x] Review HEP    [] Progressed/Changed HEP based on:   [] positioning   [] body mechanics   [] transfers   [] heat/ice application    [] other:      Other Objective/Functional Measures: Left Knee AROM 0-110 degrees post manual     Pain Level (0-10 scale) post treatment: 4    ASSESSMENT/Changes in Function: Pt was able to ascend/descend stairs with step over step pattern without increase in pain. Patient will continue to benefit from skilled PT services to modify and progress therapeutic interventions, address functional mobility deficits, address ROM deficits, address strength deficits and analyze and address soft tissue restrictions to attain remaining goals. []  See Plan of Care  []  See progress note/recertification  []  See Discharge Summary         Progress towards goals / Updated goals:     Short Term Goals: To be accomplished in 3 weeks:  1. Patient will subjectively report full compliance with prescribed HEP. Eval: HEP provided  Current: Met, HEP performance reported as prescribed, 6/11/2018  2. Patient will demonstrate left knee AROM 0-120 degrees in order to improve ease with stair management. Eval: Left Knee AROM 0-10-83 degrees  Current: Progressing, Left Knee AROM 0-110 degrees post manual, 7/10/2018  3. Patient will demonstrate left knee extension MMT 5/5 to improve ease with curb management. Eval: Left Knee Extension MMT = 2+/5  Current: Progressing, Left Knee Extension MMT = 3/5, 6/21/2018  Long Term Goals: To be accomplished in 6 weeks:  1.  Patient will demonstrate a significant functional improvement as demonstrated by a score of >/= 58 on FOTO. Eval: FOTO = 43  Current: Progressing, FOTO = 53, 7/6/2018  2. 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 6/21/18  3. Patient will demonstrate left SLS (airex) >/=30 seconds to improve ease with ambulation on uneven surfaces. Eval: Right SLS (non-Airex) 30 sec / Left SLS (non-Airex) 30 sec  Current: Goal met: SLS B on airex 30 sec without HHA.  6/21/18     PLAN  []  Upgrade activities as tolerated     [x]  Continue plan of care  []  Update interventions per flow sheet       []  Discharge due to:_  []  Other:_      Jinny Winn PTA 7/11/2018  10:31 AM    Future Appointments  Date Time Provider Luiza Petit   7/13/2018 10:00 AM Jinny Winn PTA [facility]   8/1/2018 9:15 AM MD Tiffanie Carlson 70

## 2018-07-12 ENCOUNTER — TELEPHONE (OUTPATIENT)
Dept: ORTHOPEDIC SURGERY | Facility: CLINIC | Age: 63
End: 2018-07-12

## 2018-07-12 NOTE — TELEPHONE ENCOUNTER
Mercedes Nick (Key: Mary Gray)    This request has received a Favorable outcome. The Prior Authorization request has been approved for Celecoxib 200MG OR CAPS. The authorization is valid from 06/12/2018 through 07/12/2019. Pharmacy notified.

## 2018-07-13 ENCOUNTER — HOSPITAL ENCOUNTER (OUTPATIENT)
Dept: PHYSICAL THERAPY | Age: 63
Discharge: HOME | End: 2018-07-13
Payer: COMMERCIAL

## 2018-07-13 PROCEDURE — 97112 NEUROMUSCULAR REEDUCATION: CPT

## 2018-07-13 PROCEDURE — 97016 VASOPNEUMATIC DEVICE THERAPY: CPT

## 2018-07-13 PROCEDURE — 97110 THERAPEUTIC EXERCISES: CPT

## 2018-07-13 NOTE — PROGRESS NOTES
PT DAILY TREATMENT NOTE - [facility]     Patient Name: Maria Hashimoto  Date:2018  : 1955  [x]  Patient  Verified  Payor: BLUE CROSS / Plan: [facility] [place] / Product Type: PPO /    In time:10:05  Out time:11:01  Total Treatment Time (min): 56  Total Timed Codes (min): 46  1:1 Treatment Time ( only): 35   Visit #: 5 of 18    Treatment Area: Left knee pain [M25.562]    SUBJECTIVE  Pain Level (0-10 scale): 4  Any medication changes, allergies to medications, adverse drug reactions, diagnosis change, or new procedure performed?: [x] No    [] Yes (see summary sheet for update)  Subjective functional status/changes:   [] No changes reported  Pt reports her knee feels stiff in the mornings. OBJECTIVE    Modality rationale: decrease pain to improve the patients ability to decrease difficulty while performing tasks.     Min Type Additional Details    [] Estim:  []Unatt       []IFC  []Premod                        []Other:  []w/ice   []w/heat  Position:  Location:    [] Estim: []Att    []TENS instruct  []NMES                    []Other:  []w/US   []w/ice   []w/heat  Position:  Location:    []  Traction: [] Cervical       []Lumbar                       [] Prone          []Supine                       []Intermittent   []Continuous Lbs:  [] before manual  [] after manual    []  Ultrasound: []Continuous   [] Pulsed                           []1MHz   []3MHz W/cm2:  Location:    []  Iontophoresis with dexamethasone         Location: [] Take home patch   [] In clinic    []  Ice     []  heat  []  Ice massage  []  Laser   []  Anodyne Position:  Location:    []  Laser with stim  []  Other:  Position:  Location:   10 [x]  Vasopneumatic Device Pressure:       [x] lo [] med [] hi   Temperature: [x] lo [] med [] hi   [] Skin assessment post-treatment:  []intact []redness- no adverse reaction    []redness - adverse reaction:       31 min Therapeutic Exercise:  [x] See flow sheet :   Rationale: increase ROM and increase strength to improve the patients ability to increase tolerance to activities.     15 min Neuromuscular Re-education:  [x]  See flow sheet : glut and quad activation. Rationale: increase ROM, increase strength, improve balance and increase proprioception  to improve the patients ability to improve safety with community ambulation.           With   [] TE   [] TA   [] neuro   [] other: Patient Education: [x] Review HEP    [] Progressed/Changed HEP based on:   [] positioning   [] body mechanics   [] transfers   [] heat/ice application    [] other:      Other Objective/Functional Measures: see goals. Pain Level (0-10 scale) post treatment: 4    ASSESSMENT/Changes in Function: Pt has met most LTGs. Pt's strength in left knee is WNL. Pt's AROM of left knee 0-110 deg. Pt is able to balance on SLS B on airex for 30 sec without HHA. Pt is able to ascend/descend stairs with step over step pattern. Pt is able to sit to stand 15x within 30 sec. []  See Plan of Care  []  See progress note/recertification  [x]  See Discharge Summary         Progress towards goals / Updated goals:  Short Term Goals: To be accomplished in 3 weeks:  1. Patient will subjectively report full compliance with prescribed HEP. Eval: HEP provided  Current: Met, HEP performance reported as prescribed, 6/11/2018  2. Patient will demonstrate left knee AROM 0-120 degrees in order to improve ease with stair management. Eval: Left Knee AROM 0-10-83 degrees  Current: Progressing, Left Knee AROM 0-110 degrees post manual, 7/13/2018  3. Patient will demonstrate left knee extension MMT 5/5 to improve ease with curb management. Eval: Left Knee Extension MMT = 2+/5  Current: Progressing, Left Knee Extension MMT = 5/5, 7/13/2018  Long Term Goals: To be accomplished in 6 weeks:  1. Patient will demonstrate a significant functional improvement as demonstrated by a score of >/= 58 on FOTO.   Eval: FOTO = 43  Current: Progressing, FOTO = 53, 7/6/2018  2. 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 6/21/18  3. Patient will demonstrate left SLS (airex) >/=30 seconds to improve ease with ambulation on uneven surfaces. Eval: Right SLS (non-Airex) 30 sec / Left SLS (non-Airex) 30 sec  Current: Goal met: SLS B on airex 30 sec without HHA. 6/21/18        PLAN  []  Upgrade activities as tolerated     []  Continue plan of care  []  Update interventions per flow sheet       [x]  Discharge due to:_pt has met LTGs.  7/13/18  []  Other:_      Stephen Henriquez, PTA 7/13/2018  10:11 AM    Future Appointments  Date Time Provider Luiza Petit   8/1/2018 9:15 AM Werner Salazar MD [facility] [address] 70

## 2018-07-13 NOTE — PROGRESS NOTES
[facility]              [address]        [place], [address]   [phone] fax    Discharge Summary  Patient name: Jan Damon Start of Care: 2018   Referral source: Maru Ogden MD : 1955   Medical/Treatment Diagnosis: Left knee pain [M25.562] Onset Date:DoS 2018     Prior Hospitalization: see medical history Provider#: [id number]   Medications: Verified on Patient Summary List    Comorbidities: Arthritis, BMI>30   Prior Level of Function: Ambulation without AD, Work full-time, (I) Functional ADLs, (I) Driving  Visits from Start of Care: 14    Missed Visits: 2  Reporting Period : 2018 to 2018    Summary of Care:    Short Term Goals: To be accomplished in 3 weeks:  1. Patient will subjectively report full compliance with prescribed HEP. Eval: HEP provided  At DC: Met, HEP performance reported as prescribed  2. Patient will demonstrate left knee AROM 0-120 degrees in order to improve ease with stair management. Eval: Left Knee AROM 0-10-83 degrees  At DC: Progressing, Left Knee AROM 0-110 degrees post manual  3. Patient will demonstrate left knee extension MMT 5/5 to improve ease with curb management. Eval: Left Knee Extension MMT = 2+/5  At DC: Progressing, Left Knee Extension MMT = 5/5  Long Term Goals: To be accomplished in 6 weeks:  1. Patient will demonstrate a significant functional improvement as demonstrated by a score of >/= 58 on FOTO. Eval: FOTO = 43  At DC: Progressing, FOTO = 53  2. 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 Eval: 30-second-sit-to-stand: 6 repetitions (unweighting of L LE, completion without UE assistance)  At DC: Goal met: 30 sec sit to stand 15x  3. Patient will demonstrate left SLS (airex) >/=30 seconds to improve ease with ambulation on uneven surfaces.   Eval: Right SLS (non-Airex) 30 sec / Left SLS (non-Airex) 30 sec   DCl met: SLS B on airex 30 sec without HHA    ASSESSMENT/RECOMMENDATIONS:    Pt has met most LTGs. Pt's strength in left knee is WNL. Pt's AROM of left knee 0-110 deg. Pt is able to balance on SLS B on airex for 30 sec without HHA. Pt is able to ascend/descend stairs with step over step pattern. Pt is able to sit to stand 15x within 30 sec. Patient with continued moderate pain intensity reported with functional limitations secondarily.  Patient would benefit from continuation of physical therapy services to improve functional mobility but at this time patient with desire to be discharged therefore patient to be discharged in accordance with patient request.    [x]Discontinue therapy: [x]Patient has reached or is progressing toward set goals      []Patient is non-compliant or has abdicated      []Due to lack of appreciable progress towards set goals    Daily Colon PT 7/13/2018 1:04 PM

## 2018-08-01 ENCOUNTER — OFFICE VISIT (OUTPATIENT)
Dept: ORTHOPEDIC SURGERY | Facility: CLINIC | Age: 63
End: 2018-08-01

## 2018-08-01 VITALS
DIASTOLIC BLOOD PRESSURE: 63 MMHG | WEIGHT: 181.8 LBS | SYSTOLIC BLOOD PRESSURE: 146 MMHG | RESPIRATION RATE: 18 BRPM | BODY MASS INDEX: 31.04 KG/M2 | TEMPERATURE: 96.7 F | HEART RATE: 87 BPM | HEIGHT: 64 IN | OXYGEN SATURATION: 99 %

## 2018-08-01 DIAGNOSIS — Z96.652 STATUS POST TOTAL LEFT KNEE REPLACEMENT: ICD-10-CM

## 2018-08-01 DIAGNOSIS — G89.18 POST-OPERATIVE PAIN: Primary | ICD-10-CM

## 2018-08-01 RX ORDER — OXYCODONE AND ACETAMINOPHEN 5; 325 MG/1; MG/1
1 TABLET ORAL
Qty: 30 TAB | Refills: 0 | Status: SHIPPED | OUTPATIENT
Start: 2018-08-01

## 2018-08-01 NOTE — LETTER
8/1/2018 10:15 AM 
 
Ms. Kirit Joel [id number] [address] [address] It is advised to prescribe antibiotic medication Amoxil 500 mg, take 3 by mouth prior and one after procedure. Sincerely, Shani Moya MD

## 2018-08-01 NOTE — PROGRESS NOTES
Patient: Kirit Overton                MRN: [id number]       SSN: [id number] YOB: 1955        AGE: 59 y.o. SEX: female Body mass index is 31.21 kg/(m^2). PCP: Neelam Andres MD 
08/01/18 HISTORY:  Waldemar Show returns in follow up and is about three months following her total knee replacement, overall doing well. Having some difficulty in getting the legs really straight, and she is bending it quite nicely and I would like her to have a little bit more physical therapy. PHYSICAL EXAMINATION:  On the examination she is afebrile. She is bending well to about 115 degrees, about - 3 or 4 on extension. Knee itself is very stable. At this point the hips rotate nicely and no cyanosis, peripheral edema or clubbing. She is not taking any narcotics, just Celebrex recently, and calf non tender, Homans' sign negative. Minimal effusion. No evidence for infection or DVT. She does have some exophthalmus as well and thus may need her thyroid checked again. RADIOGRAPHS:  Xrays confirm anatomic placement of the components. PLAN:  We did discuss manipulation. We are going to try with some therapy for a couple weeks and should be able to get the leg a little bit straighter. I'll see her back in about 2-3 weeks' time to have a look at the extension, will give her some pain medication as well to help her with therapy. C:   Mildred Cleary MD  
 
 
 
 
REVIEW OF SYSTEMS:   
 
CON: negative for weight loss, fever EYE: negative for double vision ENT: negative for hoarseness RS:   negative for Tb 
GI:    negative for blood in stool :  negative for blood in urine Other systems reviewed and noted below. Past Medical History:  
Diagnosis Date  Arthritis History reviewed. No pertinent family history. Current Outpatient Prescriptions Medication Sig Dispense Refill  polyethylene glycol (MIRALAX) 17 gram/dose powder Take 17 g by mouth daily.     
 celecoxib (CELEBREX) 200 mg capsule Take 1 Cap by mouth two (2) times a day for 90 days. 60 Cap 2  
 VITAMIN D2 50,000 unit capsule Take 5,000 Units by mouth daily. 0  
 oxyCODONE-acetaminophen (PERCOCET) 5-325 mg per tablet Take 1 Tab by mouth every eight (8) hours as needed for Pain (Hold if sedated. ). Max Daily Amount: 3 Tabs. 21 Tab 0  
 celecoxib (CELEBREX) 200 mg capsule Take 1 Cap by mouth two (2) times a day for 90 days. 60 Cap 2  
 oxyCODONE-acetaminophen (PERCOCET) 5-325 mg per tablet Take 1 Tab by mouth every eight (8) hours as needed for Pain. Max Daily Amount: 3 Tabs. 40 Tab 0  
 oxyCODONE-acetaminophen (PERCOCET) 7.5-325 mg per tablet Take 1-2 Tabs by mouth every eight (8) hours as needed for Pain. Max Daily Amount: 6 Tabs. 42 Tab 0  
 oxyCODONE-acetaminophen (PERCOCET) 7.5-325 mg per tablet Take 1 Tab by mouth every six (6) hours as needed for Pain. Max Daily Amount: 4 Tabs. 40 Tab 0  
 buffered aspirin (BUFFERIN) 325 mg tablet Take 1 Tab by mouth two (2) times a day. 60 Tab 0  
 ferrous sulfate 325 mg (65 mg iron) tablet Take 1 Tab by mouth two (2) times daily (with meals). 60 Tab 2  
 oxyCODONE-acetaminophen (PERCOCET)  mg per tablet Take 1-2 Tabs by mouth every four (4) hours as needed for Pain. Max Daily Amount: 12 Tabs. 60 Tab 0 No Known Allergies Past Surgical History:  
Procedure Laterality Date  HX COLONOSCOPY  2009  HX HEMORRHOIDECTOMY  2016  HX HYSTERECTOMY Social History Social History  Marital status:  Spouse name: N/A  
 Number of children: N/A  
 Years of education: N/A Occupational History  Not on file. Social History Main Topics  Smoking status: Former Smoker  Smokeless tobacco: Never Used  Alcohol use No  
 Drug use: No  
 Sexual activity: Not on file Other Topics Concern  Not on file Social History Narrative Visit Vitals  /63  Pulse 87  Temp 96.7 °F (35.9 °C) (Oral)  Resp 18  Ht 5' 4\" (1.626 m)  Wt 181 lb 12.8 oz (82.5 kg)  SpO2 99%  BMI 31.21 kg/m2 PHYSICAL EXAMINATION: 
GENERAL: Alert and oriented x3, in no acute distress, well-developed, well-nourished, afebrile. HEART: No JVD. EYES: No scleral icterus NECK: No significant lymphadenopathy LUNGS: No respiratory compromise or indrawing ABDOMEN: Soft, non-tender, non-distended. Electronically signed by:  Michela Salmon MD

## 2018-08-02 ENCOUNTER — TELEPHONE (OUTPATIENT)
Dept: ORTHOPEDIC SURGERY | Facility: CLINIC | Age: 63
End: 2018-08-02

## 2018-08-02 NOTE — TELEPHONE ENCOUNTER
Patient is asking for Dr. Feliberto Carranza to call in the antibiotic that she needs to pre medicate with prior to her dental appts.   Patient can be reached at [phone]

## 2018-08-02 NOTE — TELEPHONE ENCOUNTER
Patient called in states that she needed to talk with Dr. Ezra Mix b/c it is a very urgent matter. Patient can be reached [phone].

## 2018-08-03 RX ORDER — AMOXICILLIN 500 MG/1
CAPSULE ORAL
Qty: 4 CAP | Refills: 3 | Status: SHIPPED | OUTPATIENT
Start: 2018-08-03 | End: 2018-10-31 | Stop reason: SDUPTHER

## 2018-08-03 NOTE — TELEPHONE ENCOUNTER
Patient states she needs an antibiotic prior to dental procedures. Please send medication to [facility] [address]. Patient also asking for [facility] paper work. Instructed patient to get paperwork from her job and that she may drop it off at one of our office locations.

## 2018-08-03 NOTE — TELEPHONE ENCOUNTER
Patient was attempting to reach [facility] back. Please advise patient back at [phone].

## 2018-08-08 ENCOUNTER — HOSPITAL ENCOUNTER (OUTPATIENT)
Dept: PHYSICAL THERAPY | Age: 63
Discharge: HOME | End: 2018-08-08
Payer: COMMERCIAL

## 2018-08-08 PROCEDURE — 97162 PT EVAL MOD COMPLEX 30 MIN: CPT

## 2018-08-08 PROCEDURE — 97140 MANUAL THERAPY 1/> REGIONS: CPT

## 2018-08-08 PROCEDURE — 97161 PT EVAL LOW COMPLEX 20 MIN: CPT

## 2018-08-08 PROCEDURE — 97110 THERAPEUTIC EXERCISES: CPT

## 2018-08-08 NOTE — PROGRESS NOTES
PT DAILY TREATMENT NOTE/KNEE EVAL 3-16    Patient Name: Dread Reed  Date:2018  : 1955  [x]  Patient  Verified  Payor: BLUE CROSS / Plan: [employer] [facility] [place] / Product Type: PPO /    In time:12:00  Out time: 12:55  Total Treatment Time (min): 55  Total Timed Codes (min): 25  1:1 Treatment Time ( only): 54  Visit #: 1 of 6    Treatment Area: Presence of left artificial knee joint [Z96.652]    SUBJECTIVE  Pain Level (0-10 scale):   1  []constant [x]intermittent [x]improving []worsening []no change since onset    Any medication changes, allergies to medications, adverse drug reactions, diagnosis change, or new procedure performed?: [x] No    [] Yes (see summary sheet for update)  Subjective functional status/changes:     PLOF: Ambulation without AD, Work full-time, (I) Functional ADLs, (I) Driving  Limitations to PLOF:  Notes occasional difficulty with walking and stair navigation when her knee is swollen   Mechanism of Injury: left TKA on 18  Current symptoms/Complaints:Pt complaints of continued swelling after working out for about 15 minutes. Notes when she experiences swelling has difficulty walking. Notes MD has not cleared her to return to work. 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   Pt Goals: reduce   OBJECTIVE/EXAMINATION  Domestic Life: (I)  Activity/Recreational Limitations: (I), notes some difficulty if she has swelling  Mobility: (I)  Self Care: (I)      30 min [x]Eval                  []Re-Eval        15 min Therapeutic Exercise:  [x] See flow sheet : Includes pt education on HEP   Rationale: increase ROM and increase strength to improve the patients ability to increase ease with ambulation    10 min Manual Therapy:  Knee extension mobs, STM to gastroc and hamstring, manual hamstring and gastroc stretch   Rationale: decrease pain, increase ROM and decrease edema  to to increase ease with ambulation          With   [] TE   [] TA   [] neuro   [] other: Patient Education: [x] Review HEP    [] Progressed/Changed HEP based on:   [] positioning   [] body mechanics   [] transfers   [] heat/ice application    [] other:          Physical Therapy Evaluation - Knee      Gait:  [] Normal    [] Abnormal    [x] Antalgic    [] NWB    Device:    Describe:  Currently ambulates without AD.    ROM / Strength  [] Unable to assess           Strength (1-5)                  AROM                            PROM                       Left Right Left Right Left Right   Hip Flexion 4 4        Extension 4 4-        Abduction 4- 4-        Adduction         Knee Flexion 5 5 110 130 113     Extension 5 5 -3 (pre-manual)  0 (post manual) 0     Ankle Plantarflexion          Dorsiflexion             Flexibility:   Hamstrings:    (L) Tightness= [] WNL   [] Min   [x] Mod   [] Severe  left slightly more than right   (R) Tightness= [] WNL   [] Min   [x] Mod   [] Severe  Quadriceps:    (L) Tightness= [] WNL   [] Min   [x] Mod   [] Severe Left slightly more than right   (R) Tightness= [] WNL   [] Min   [x] Mod   [] Severe  Gastroc:      (L) Tightness= [] WNL   [] Min   [x] Mod   [] Severe Left slightly more than right   (R) Tightness= [] WNL   [] Min   [x] Mod   [] Severe  Other:    Palpation:   Neg/Pos  Neg/Pos  Neg/Pos   Joint Line neg Quad tendon neg Patellar ligament neg   Patella neg Fibular head neg Pes Anserinus neg   Tibial tubercle neg Hamstring tendons pos Infrapatellar fat pad neg     Optional Tests:  Patellar Positioning (Static)   [x]L [x]R Normal []L []R Lateral   []L []R Amaya Clem      []L []R Medial   []L []R Baja    Patellar Tracking   []L []R Glide (Lat)   []L []R Tilt (Lat)     []L []R Glide (Med)  []L []R Tilt (Med)      []L []R Tile (Inf)     Patellar Mobility   []L []R Hypermobile [x]L []R Hypomobile           Other tests/comments:    SLS: 30 seconds on both sides with EO, minimal sway       Pain Level (0-10 scale) post treatment: 0    ASSESSMENT/Changes in Function: Pt is a 58 yo female being referred to physical therapy secondary to ROM deficits following a left TKA on 5/02/18. Pt reports an uncomplicated hospital stay following surgery and was previously discharged  from physical therapy about 3 weeks ago. Pt reports significant progress while in therapy and has met most long term goals. Pt notes she was referred back to physical therapy after a follow up with her Dr to further increase left knee extension. Pt currently presents with mild swelling, moderate tightness in left hamstring and gastrocnemius which could be contributing to lack of knee extension ROM. Pt notes she no longer experiences pain and describes her sensation as \" tight and stiff\". Pt notes that her knee is more stiff first thing in the morning and at the end of the day. Pt notes she is (I) with self care tasks, ADLs and household/community ambulation and no longer uses an AD. Pt reports she has started an exercise routine  and occasionaly notices increased swelling following exercises and prolonged walking. Pt notes she ices to help reduce the swelling and continues to perform her  previous HEP on a regular basis. Due to impairments listed above pt has trouble with prolonged community ambulation and stair navigation. Patient will continue to benefit from skilled PT services to modify and progress therapeutic interventions, address functional mobility deficits, address ROM deficits, address strength deficits and analyze and address soft tissue restrictions to attain remaining goals.      [x]  See Plan of Care  []  See progress note/recertification  []  See Discharge Summary         Progress towards goals / Updated goals:  Short term goals to be achieved in 1 week   1) Pt will report compliance with HEP to increase progress in therapy  At al: reviewed HEP    Long term goals to be achieved in 3 weeks  1) Pt will demonstrate full left knee extension pre manual to increase ease with community ambulation. At eval: left knee extension pre-manual= -3 deg left knee extension post-manual=0 deg   2) Pt will report an overall improvement in function to >/=80% in order to increase ease with functional daily activities   At eval: 0%   3) Pt will have an increased FOTO to >/= 63 in order to improve function.   At eval: FOTO= 53     PLAN  [x]  Upgrade activities as tolerated     [x]  Continue plan of care  []  Update interventions per flow sheet       []  Discharge due to:_  []  Other:_      Stephanie Villasenor 8/8/2018  12:03 PM

## 2018-08-08 NOTE — PROGRESS NOTES
[facility]              [address]        [place], [address] [place]   [phone] fax    Plan of Care/ Statement of Necessity for Physical Therapy Services  Patient name: Zena Espinosa Start of Care: 2018   Referral source: Irene Mensah MD : 1955    Medical Diagnosis: Presence of left artificial knee joint [Z96.652]   Onset Date:left TKA 3/02/18   Treatment Diagnosis: left TKA   Prior Hospitalization: see medical history Provider#: [id number]   Medications: Verified on Patient summary List    Comorbidities: Arthritis, BMI>30   Prior Level of Function: Ambulation without AD, Work full-time, (I) Functional ADLs, (I) Driving     The Plan of Care and following information is based on the information from the initial evaluation. Assessment/ key information:     Pt is a 60 yo female being referred to physical therapy secondary to ROM deficits following a left TKA on 18. Pt reports an uncomplicated hospital stay following surgery and was previously discharged  from physical therapy about 3 weeks ago. Pt reports significant progress while in therapy and has met most long term goals. Pt notes she was referred back to physical therapy after a follow up with her Dr to further increase left knee extension. Pt currently presents with mild swelling, moderate tightness in left hamstring and gastrocnemius which could be contributing to lack of knee extension ROM. Pt notes she no longer experiences pain and describes her sensation as \" tight and stiff\". Pt notes that her knee is more stiff first thing in the morning and at the end of the day. Pt notes she is (I) with self care tasks, ADLs and household/community ambulation and no longer uses an AD. Pt reports she has started an exercise routine  and occasionaly notices increased swelling following exercises and prolonged walking.  Pt notes she ices to help reduce the swelling and continues to perform her previous HEP on a regular basis. Due to impairments listed above pt has trouble with prolonged community ambulation and stair navigation. Patient will continue to benefit from skilled PT services to modify and progress therapeutic interventions, address functional mobility deficits, address ROM deficits, address strength deficits and analyze and address soft tissue restrictions to attain remaining goals.     Physical Therapy Evaluation - Knee        Gait:                                    [] Normal    [] Abnormal    [x] Antalgic    [] NWB    Device:                                              Describe:  Currently ambulates without AD.     ROM / Strength  [] Unable to assess           Strength (1-5)                  AROM                            PROM                         Left Right Left Right Left Right   Hip Flexion 4 4             Extension 4 4-             Abduction 4- 4-             Adduction               Knee Flexion 5 5 110 130 113       Extension 5 5 -3 (pre-manual)  0 (post manual) 0       Ankle Plantarflexion                 Dorsiflexion                     Flexibility:   Hamstrings:                         (L) Tightness= [] WNL   [] Min   [x] Mod   [] Severe             left slightly more than right                        (R) Tightness= [] WNL   [] Min   [x] Mod   [] Severe  Quadriceps:                         (L) Tightness= [] WNL   [] Min   [x] Mod   [] Severe            Left slightly more than right                        (R) Tightness= [] WNL   [] Min   [x] Mod   [] Severe  Gastroc:                           (L) Tightness= [] WNL   [] Min   [x] Mod   [] Severe            Left slightly more than right                        (R) Tightness= [] WNL   [] Min   [x] Mod   [] Severe  Other:     Palpation:    Neg/Pos   Neg/Pos   Neg/Pos   Joint Line neg Quad tendon neg Patellar ligament neg   Patella neg Fibular head neg Pes Anserinus neg   Tibial tubercle neg Hamstring tendons pos Infrapatellar fat pad neg      Optional Tests:  Patellar Positioning (Static)                        [x]L [x]R Normal                []L []R Lateral                        []L []R Hung Moll                     []L []R Medial                        []L []R Baja        Patellar Mobility                        []L []R Hypermobile                   [x]L []R Hypomobile             Other tests/comments:     SLS: 30 seconds on both sides with EO, minimal sway        Evaluation Complexity History MEDIUM  Complexity : 1-2 comorbidities / personal factors will impact the outcome/ POC ; Examination MEDIUM Complexity : 3 Standardized tests and measures addressing body structure, function, activity limitation and / or participation in recreation  ;Presentation LOW Complexity : Stable, uncomplicated  ;Clinical Decision Making MEDIUM Complexity : FOTO score of 26-74  Overall Complexity Rating: LOW   Problem List: pain affecting function, decrease ROM, edema affecting function, decrease ADL/ functional abilitiies, decrease activity tolerance and decrease flexibility/ joint mobility   Treatment Plan may include any combination of the following: Therapeutic exercise, Therapeutic activities, Neuromuscular re-education, Physical agent/modality, Gait/balance training, Manual therapy, Patient education, Functional mobility training, Home safety training and Stair training  Patient / Family readiness to learn indicated by: asking questions  Persons(s) to be included in education: patient (P)  Barriers to Learning/Limitations: None  Patient Goal (s): increase ROM to return to PLOF  Patient Self Reported Health Status: good  Rehabilitation Potential: good    Short term goals to be achieved in 1 week   1) Pt will report compliance with HEP to increase progress in therapy     Long term goals to be achieved in 3 weeks  1) Pt will demonstrate full left knee extension pre manual to increase ease with community ambulation.    2) Pt will report an overall improvement in function to >/=80% in order to increase ease with functional daily activities    3) Pt will have an increased FOTO to >/= 63 in order to improve function. Frequency / Duration: Patient to be seen 2 times per week for 3 weeks. Patient/ Caregiver education and instruction: Diagnosis, prognosis, exercises   [x]  Plan of care has been reviewed with GILDA Benjamin 8/8/2018 2:25 PM  ________________________________________________________________________    I certify that the above Therapy Services are being furnished while the patient is under my care. I agree with the treatment plan and certify that this therapy is necessary.     Physician's Signature:____________Date:_________TIME:________    ** Signature, Date and Time must be completed for valid certification **  Please sign and return to [facility]/ [address]              [address]        [place], [address] [place]   [phone] fax

## 2018-08-09 ENCOUNTER — HOSPITAL ENCOUNTER (OUTPATIENT)
Dept: PHYSICAL THERAPY | Age: 63
Discharge: HOME | End: 2018-08-09
Payer: COMMERCIAL

## 2018-08-09 PROCEDURE — 97140 MANUAL THERAPY 1/> REGIONS: CPT

## 2018-08-09 PROCEDURE — 97110 THERAPEUTIC EXERCISES: CPT

## 2018-08-09 NOTE — PROGRESS NOTES
PT DAILY TREATMENT NOTE - [facility]     Patient Name: Cece Abernathy  Date:2018  : 1955  [x]  Patient  Verified  Payor: BLUE CROSS / Plan: [employer] [facility] [place] / Product Type: PPO /    In time:8:34  Out time:9:24  Total Treatment Time (min): 50  Total Timed Codes (min): 50  1:1 Treatment Time (MC only): 40   Visit #: 2 of 6    Treatment Area: Presence of left artificial knee joint [Z96.652]    SUBJECTIVE  Pain Level (0-10 scale): 2  Any medication changes, allergies to medications, adverse drug reactions, diagnosis change, or new procedure performed?: [x] No    [] Yes (see summary sheet for update)  Subjective functional status/changes:   [] No changes reported  Pt reports she does not feel limited with any activities she performs throughout the day.      OBJECTIVE        Min Type Additional Details    [] Estim:  []Unatt       []IFC  []Premod                        []Other:  []w/ice   []w/heat  Position:  Location:    [] Estim: []Att    []TENS instruct  []NMES                    []Other:  []w/US   []w/ice   []w/heat  Position:  Location:    []  Traction: [] Cervical       []Lumbar                       [] Prone          []Supine                       []Intermittent   []Continuous Lbs:  [] before manual  [] after manual    []  Ultrasound: []Continuous   [] Pulsed                           []1MHz   []3MHz W/cm2:  Location:    []  Iontophoresis with dexamethasone         Location: [] Take home patch   [] In clinic    []  Ice     []  heat  []  Ice massage  []  Laser   []  Anodyne Position:  Location:    []  Laser with stim  []  Other:  Position:  Location:    []  Vasopneumatic Device Pressure:       [] lo [] med [] hi   Temperature: [] lo [] med [] hi   [] Skin assessment post-treatment:  []intact []redness- no adverse reaction    []redness  adverse reaction:       40 min Therapeutic Exercise:  [x] See flow sheet :   Rationale: increase ROM and increase strength to improve the patients ability to increase tolerance to activities. 10 min Manual Therapy:  Patellar mobs, knee extension mobs, knee extension stretch, hs stretch, contract/releax hs stretch, gastroc stretch. Rationale: decrease pain, increase ROM, increase tissue extensibility and decrease trigger points to increase ease with ADLs. With   [] TE   [] TA   [] neuro   [] other: Patient Education: [x] Review HEP    [] Progressed/Changed HEP based on:   [] positioning   [] body mechanics   [] transfers   [] heat/ice application    [] other:      Other Objective/Functional Measures: Pt reports performing HEP. Pain Level (0-10 scale) post treatment: 3    ASSESSMENT/Changes in Function: Initiated exercises per POC. Pt has poor quad contraction, provided verbal and tactile cues for proper form. Educated pt on importance of maintaining hamstring and gastroc flexibility to be able to maintain terminal knee extension. Patient will continue to benefit from skilled PT services to modify and progress therapeutic interventions, address functional mobility deficits, address ROM deficits, address strength deficits and analyze and address soft tissue restrictions to attain remaining goals. []  See Plan of Care  []  See progress note/recertification  []  See Discharge Summary         Progress towards goals / Updated goals:  Short term goals to be achieved in 1 week   1) Pt will report compliance with HEP to increase progress in therapy  At [facility]: reviewed HEP  Current: Goal met: Pt reports performing HEP. 8/9/18     Long term goals to be achieved in 3 weeks  1) Pt will demonstrate full left knee extension pre manual to increase ease with community ambulation.   At [facility]: left knee extension pre-manual= -3 deg left knee extension post-manual=0 deg   2) Pt will report an overall improvement in function to >/=80% in order to increase ease with functional daily activities   At [facility]: 0%   3) Pt will have an increased FOTO to >/= 63 in order to improve function.   At eval: FOTO= 53        PLAN  []  Upgrade activities as tolerated     [x]  Continue plan of care  []  Update interventions per flow sheet       []  Discharge due to:_  []  Other:_      Valentina Wheeler PTA 8/9/2018  8:34 AM    Future Appointments  Date Time Provider Luiza Petit   8/29/2018 8:45 AM David Hopper MD [place] [id number]

## 2018-08-14 ENCOUNTER — HOSPITAL ENCOUNTER (OUTPATIENT)
Dept: PHYSICAL THERAPY | Age: 63
Discharge: HOME | End: 2018-08-14
Payer: COMMERCIAL

## 2018-08-14 PROCEDURE — 97110 THERAPEUTIC EXERCISES: CPT

## 2018-08-14 PROCEDURE — 97140 MANUAL THERAPY 1/> REGIONS: CPT

## 2018-08-14 PROCEDURE — 97112 NEUROMUSCULAR REEDUCATION: CPT

## 2018-08-14 NOTE — PROGRESS NOTES
PT DAILY TREATMENT NOTE - [facility]     Patient Name: Romi Cruz  Date:2018  : 1955  [x]  Patient  Verified  Payor: BLUE CROSS / Plan: [employer] [facility] [place] / Product Type: PPO /    In time:1202  Out time:1258  Total Treatment Time (min): 56  Total Timed Codes (min): 56  1:1 Treatment Time ( only): 45   Visit #: 3 of 6    Treatment Area: Presence of left artificial knee joint [Z96.652]    SUBJECTIVE  Pain Level (0-10 scale): 2  Any medication changes, allergies to medications, adverse drug reactions, diagnosis change, or new procedure performed?: [x] No    [] Yes (see summary sheet for update)  Subjective functional status/changes:   [] No changes reported  Patient reports no change since last treatment session. OBJECTIVE    26 min Therapeutic Exercise:  [x] See flow sheet : Emphasis placed on improving available knee AROM and strength   Rationale: increase ROM and increase strength to improve the patients ability to improve ease with return to work. 20 min Neuromuscular Re-education:  [x]  See flow sheet : Emphasis placed on improving LE proprioceptive and kinesthetic awareness and improving LE static and dynamic stability.     Rationale: increase ROM and increase strength  to improve the patients ability to improve ease with community ambulation    10 min Manual Therapy:    Supine, Left Popliteus and Distal Hamstring STM  Supine, Left Femur AP Grade III Mobilization (TKE)  Supine, Left Knee Manually-Resisted TKE   Rationale: decrease pain, increase ROM and increase tissue extensibility to improve ease with stair management     With   [] TE   [] TA   [] neuro   [] other: Patient Education: [x] Review HEP    [] Progressed/Changed HEP based on:   [] positioning   [] body mechanics   [] transfers   [] heat/ice application    [] other:      Pain Level (0-10 scale) post treatment: 0    ASSESSMENT/Changes in Function: Patient noted with continued gluteal compensation with more quadriceps recruitment. Femoral internal rotation demonstrated with quadriceps activation with improved mechanics with provision of verbal and tactile cuing. Patient will continue to benefit from skilled PT services to modify and progress therapeutic interventions, address functional mobility deficits, address ROM deficits, address strength deficits, analyze and address soft tissue restrictions, analyze and cue movement patterns and analyze and modify body mechanics/ergonomics to attain remaining goals. []  See Plan of Care  []  See progress note/recertification  []  See Discharge Summary         Progress towards goals / Updated goals:    Short term goals to be achieved in 1 week   1) Pt will report compliance with HEP to increase progress in therapy  At [facility]: reviewed HEP  Current: Goal met: Pt reports performing HEP. 8/9/18      Long term goals to be achieved in 3 weeks  1) Pt will demonstrate full left knee extension pre manual to increase ease with community ambulation. At [facility]: left knee extension pre-manual= -3 deg left knee extension post-manual=0 deg  Current: Remains, Pre-Manual Knee Extension = (0)3 deg, 8/14/2018   2) Pt will report an overall improvement in function to >/=80% in order to increase ease with functional daily activities   At [facility]: 0%   3) Pt will have an increased FOTO to >/= 63 in order to improve function.   At [facility]: FOTO= 53     PLAN  [x]  Upgrade activities as tolerated     [x]  Continue plan of care  []  Update interventions per flow sheet       []  Discharge due to:_  []  Other:_      Rekha Cervantes PT 8/14/2018  8:26 AM    Future Appointments  Date Time Provider Luiza Petit   8/14/2018 12:00 PM Rekha Cervantes PT [facility]   8/16/2018 8:00 AM Rekha Cervantes PT [facility]   8/29/2018 8:45 AM Ivanna Sorto MD [facility] 69

## 2018-08-16 ENCOUNTER — HOSPITAL ENCOUNTER (OUTPATIENT)
Dept: PHYSICAL THERAPY | Age: 63
Discharge: HOME | End: 2018-08-16
Payer: COMMERCIAL

## 2018-08-16 PROCEDURE — 97112 NEUROMUSCULAR REEDUCATION: CPT

## 2018-08-16 PROCEDURE — 97140 MANUAL THERAPY 1/> REGIONS: CPT

## 2018-08-16 PROCEDURE — 97110 THERAPEUTIC EXERCISES: CPT

## 2018-08-16 NOTE — PROGRESS NOTES
PT DAILY TREATMENT NOTE - [facility]     Patient Name: Marvin Macias  Date:2018  : 1955  [x]  Patient  Verified  Payor: BLUE CROSS / Plan: [employer] [facility] [place] / Product Type: PPO /    In time:0800  Out time:0915  Total Treatment Time (min): 75  Total Timed Codes (min): 65  1:1 Treatment Time (MC only): 45   Visit #: 4 of 6    Treatment Area: Presence of left artificial knee joint [Z96.652]    SUBJECTIVE  Pain Level (0-10 scale): 3  Any medication changes, allergies to medications, adverse drug reactions, diagnosis change, or new procedure performed?: [x] No    [] Yes (see summary sheet for update)  Subjective functional status/changes:   [] No changes reported  Patient reports slight increase in pain this AM secondary to increased activity level yesterday    OBJECTIVE    Modality rationale: decrease inflammation and decrease pain to improve the patients ability to improve ease with sleep   Min Type Additional Details   10 [x]  Ice     []  heat  []  Ice massage Position: Reclined  Location: Left Knee, Post-Tx     30 min Therapeutic Exercise:  [x] See flow sheet : Emphasis placed on improving available knee AROM and strength   Rationale: increase ROM and increase strength to improve the patients ability to improve ease with return to work.     25 min Neuromuscular Re-education:  [x]  See flow sheet : Emphasis placed on improving LE proprioceptive and kinesthetic awareness and improving LE static and dynamic stability.     Rationale: increase ROM and increase strength  to improve the patients ability to improve ease with community ambulation     10 min Manual Therapy:    Supine, Left Popliteus and Distal Hamstring STM  Supine, Left Femur AP Grade III Mobilization (TKE)  Supine, Left Knee Manually-Resisted TKE   Rationale: decrease pain, increase ROM and increase tissue extensibility to improve ease with stair management          With   [] TE   [] TA   [] neuro   [] other: Patient Education: [x] Review HEP    [] Progressed/Changed HEP based on:   [] positioning   [] body mechanics   [] transfers   [] heat/ice application    [] other:      Pain Level (0-10 scale) post treatment: 3    ASSESSMENT/Changes in Function: Patient continues to demonstrate significant hypertonicity of musculature of popliteal fossa with patient educated regarding heat application to posterior thigh with stretching post-completion to aid in improvements in knee extension AROM. Continued tendency to create abductor moment with step up with improved form with provision of verbal cuing. Patient will continue to benefit from skilled PT services to modify and progress therapeutic interventions, address functional mobility deficits, address ROM deficits, address strength deficits, analyze and address soft tissue restrictions and analyze and cue movement patterns to attain remaining goals. []  See Plan of Care  []  See progress note/recertification  []  See Discharge Summary         Progress towards goals / Updated goals:    Short term goals to be achieved in 1 week   1) Pt will report compliance with HEP to increase progress in therapy  At [facility]: reviewed HEP  Current: Goal met: Pt reports performing HEP. 8/9/18      Long term goals to be achieved in 3 weeks  1) Pt will demonstrate full left knee extension pre manual to increase ease with community ambulation. At eval: left knee extension pre-manual= -3 deg left knee extension post-manual=0 deg  Current: Remains, Pre-Manual Knee Extension = (0)3 deg, 8/14/2018   2) Pt will report an overall improvement in function to >/=80% in order to increase ease with functional daily activities   At eval: 0%   3) Pt will have an increased FOTO to >/= 63 in order to improve function.   At eval: FOTO= 53     PLAN  [x]  Upgrade activities as tolerated     [x]  Continue plan of care  []  Update interventions per flow sheet       []  Discharge due to:_  []  Other:_      Eusebia Fraction, PT 8/16/2018  7:50 AM    Future Appointments  Date Time Provider Luiza Marisabel   8/16/2018 8:00 AM [place] [facility]   8/20/2018 7:30 AM Lily Skelton PTA [facility]   8/23/2018 11:00 [place] [facility]   8/29/2018 8:45 AM Keara Obrien MD [facility] 69

## 2018-08-20 ENCOUNTER — HOSPITAL ENCOUNTER (OUTPATIENT)
Dept: PHYSICAL THERAPY | Age: 63
Discharge: HOME | End: 2018-08-20
Payer: COMMERCIAL

## 2018-08-20 PROCEDURE — 97110 THERAPEUTIC EXERCISES: CPT

## 2018-08-20 PROCEDURE — 97112 NEUROMUSCULAR REEDUCATION: CPT

## 2018-08-20 NOTE — PROGRESS NOTES
PT DAILY TREATMENT NOTE - [facility]     Patient Name: Bobbi Johnson  Date:2018  : 1955  [x]  Patient  Verified  Payor: BLUE CROSS / Plan:  [facility] / Product Type: PPO /    In time:7:38  Out time:8:37  Total Treatment Time (min): 59  Total Timed Codes (min): 49  1:1 Treatment Time ( only): 52   Visit #: 5 of 6    Treatment Area: Presence of left artificial knee joint [Z96.652]    SUBJECTIVE  Pain Level (0-10 scale): 0  Any medication changes, allergies to medications, adverse drug reactions, diagnosis change, or new procedure performed?: [x] No    [] Yes (see summary sheet for update)  Subjective functional status/changes:   [] No changes reported  Pt reports that her knee gave her problems this weekend due to the weather. Pt reports that stairs continue to bother her. OBJECTIVE    Modality rationale: decrease pain to improve the patients ability to decrease difficulty while performing tasks.     Min Type Additional Details    [] Estim:  []Unatt       []IFC  []Premod                        []Other:  []w/ice   []w/heat  Position:  Location:    [] Estim: []Att    []TENS instruct  []NMES                    []Other:  []w/US   []w/ice   []w/heat  Position:  Location:    []  Traction: [] Cervical       []Lumbar                       [] Prone          []Supine                       []Intermittent   []Continuous Lbs:  [] before manual  [] after manual    []  Ultrasound: []Continuous   [] Pulsed                           []1MHz   []3MHz W/cm2:  Location:    []  Iontophoresis with dexamethasone         Location: [] Take home patch   [] In clinic   10 [x]  Ice     []  heat  []  Ice massage  []  Laser   []  Anodyne Position:sitting  Location:left knee    []  Laser with stim  []  Other:  Position:  Location:    []  Vasopneumatic Device Pressure:       [] lo [] med [] hi   Temperature: [] lo [] med [] hi   [] Skin assessment post-treatment:  []intact []redness- no adverse reaction    []redness  adverse reaction:       34 min Therapeutic Exercise:  [x] See flow sheet :   Rationale: increase ROM and increase strength to improve the patients ability to increase tolerance to activities. 15 min Neuromuscular Re-education:  [x]  See flow sheet :quad activation exercises. Rationale: increase ROM and increase strength  to improve the patients ability to increase ease with ADLs. With   [] TE   [] TA   [] neuro   [] other: Patient Education: [x] Review HEP    [] Progressed/Changed HEP based on:   [] positioning   [] body mechanics   [] transfers   [] heat/ice application    [] other:      Other Objective/Functional Measures: pre manual 0-106 deg. Pain Level (0-10 scale) post treatment: 2    ASSESSMENT/Changes in Function: Pt plans to DC [place]. Pt gained terminal knee extension without manual being performed. Patient will continue to benefit from skilled PT services to modify and progress therapeutic interventions, address functional mobility deficits, address ROM deficits, address strength deficits and analyze and address soft tissue restrictions to attain remaining goals. []  See Plan of Care  []  See progress note/recertification  []  See Discharge Summary         Progress towards goals / Updated goals:     Short term goals to be achieved in 1 week   1) Pt will report compliance with HEP to increase progress in therapy  At [facility]: reviewed HEP  Current: Goal met: Pt reports performing HEP. 8/9/18      Long term goals to be achieved in 3 weeks  1) Pt will demonstrate full left knee extension pre manual to increase ease with community ambulation.   At [facility]: left knee extension pre-manual= -3 deg left knee extension post-manual=0 deg  Current: Remains, Pre-Manual Knee Extension = 0 deg, 8/20/2018   2) Pt will report an overall improvement in function to >/=80% in order to increase ease with functional daily activities   At [facility]: 0%   3) Pt will have an increased FOTO to >/= 63 in order to improve function.   At eval: FOTO= 53     PLAN  []  Upgrade activities as tolerated     [x]  Continue plan of care  []  Update interventions per flow sheet       []  Discharge due to:_  []  Other:_      Dorene Decker PTA 8/20/2018  7:27 AM    Future Appointments  Date Time Provider Luiza Marisabel   8/20/2018 7:30 AM Dorene Decker PTA [facility]   8/23/2018 11:00 AM Oliva Scanlon PT [facility]   8/29/2018 8:45 AM Harsh Morales MD [facility] 69

## 2018-08-23 ENCOUNTER — HOSPITAL ENCOUNTER (OUTPATIENT)
Dept: PHYSICAL THERAPY | Age: 63
Discharge: HOME | End: 2018-08-23
Payer: COMMERCIAL

## 2018-08-23 PROCEDURE — 97110 THERAPEUTIC EXERCISES: CPT

## 2018-08-23 PROCEDURE — 97112 NEUROMUSCULAR REEDUCATION: CPT

## 2018-08-23 NOTE — PROGRESS NOTES
[facility]              [address]        [place], [address] [place]   [phone] fax    Discharge Summary  Patient name: Allen Matos Start of Care: 2018   Referral source: Arcadio Coombs MD : 1955   Medical/Treatment Diagnosis: Presence of left artificial knee joint [Z96.652] Onset Date:Left TKA 3/2/2018     Prior Hospitalization: see medical history Provider#: [id number]   Medications: Verified on Patient Summary List    Comorbidities: Arthritis, BMI>30   Prior Level of Function: Ambulation without AD, Work full-time, (I) Functional ADLs, (I) Driving  Visits from Start of Care: 6    Missed Visits: 0  Reporting Period : 2018 to 2018    Summary of Care:    Short term goals to be achieved in 1 week   1) Pt will report compliance with HEP to increase progress in therapy  At [facility]: reviewed HEP  At [facility]: Goal met: Pt reports performing HEP      Long term goals to be achieved in 3 weeks  1) Pt will demonstrate full left knee extension pre manual to increase ease with community ambulation. At [facility]: left knee extension pre-manual= -3 deg left knee extension post-manual=0 deg  At [facility]: Met, Pre-Manual Knee Extension = 0 deg   2) Pt will report an overall improvement in function to >/=80% in order to increase ease with functional daily activities   At [facility]: 0%   At [facility]: Remains, 0% improvement  3) Pt will have an increased FOTO to >/= 63 in order to improve function. At [facility]: FOTO= 53  At [facility]: Regressing, FOTO = 48     ASSESSMENT/RECOMMENDATIONS:    At this time patient suitable for discharge with patient having demonstrated a slight improvement in knee AROM with AROM 0-106 degrees but patient subjectively denying any functional improvements. Patient has demonstrated good compliance with within clinic treatment.  At this time patient to be discharged with patient having been provided with HEP to allow for continued independent progression with patient having voiced non-desire to continue with PT services.     [x]Discontinue therapy: [x]Patient has reached or is progressing toward set goals      []Patient is non-compliant or has abdicated      []Due to lack of appreciable progress towards set 55 Melvi Aldana PT 8/23/2018 7:58 AM

## 2018-08-23 NOTE — PROGRESS NOTES
PT DAILY TREATMENT NOTE - [facility]     Patient Name: Romi Cruz  Date:2018  : 1955  [x]  Patient  Verified  Payor: BLUE CROSS / Plan: [employer] [facility] [place] / Product Type: PPO /    In time:1101  Out time:1200  Total Treatment Time (min): 59  Total Timed Codes (min): 59  1:1 Treatment Time ( only): 29   Visit #: 6 of 6    Treatment Area: Presence of left artificial knee joint [Z96.652]    SUBJECTIVE  Pain Level (0-10 scale): 2  Any medication changes, allergies to medications, adverse drug reactions, diagnosis change, or new procedure performed?: [x] No    [] Yes (see summary sheet for update)  Subjective functional status/changes:   [] No changes reported  Patient reports desire to be discharged at this time. OBJECTIVE    30 min Therapeutic Exercise:  [x] See flow sheet : Emphasis placed on improving available knee AROM and strength   Rationale: increase ROM and increase strength to improve the patients ability to improve ease with return to work.      29 min Neuromuscular Re-education:  [x]  See flow sheet : Emphasis placed on improving LE proprioceptive and kinesthetic awareness and improving LE static and dynamic stability. Rationale: increase ROM and increase strength  to improve the patients ability to improve ease with community ambulation          With   [] TE   [] TA   [] neuro   [] other: Patient Education: [x] Review HEP    [] Progressed/Changed HEP based on:   [] positioning   [] body mechanics   [] transfers   [] heat/ice application    [] other:      Pain Level (0-10 scale) post treatment: 3    ASSESSMENT/Changes in Function: At this time patient suitable for discharge with patient having demonstrated a slight improvement in knee AROM with AROM 0-106 degrees but patient subjectively denying any functional improvements. Patient has demonstrated good compliance with within clinic treatment.  At this time patient to be discharged with patient having been provided with HEP to allow for continued independent progression with patient having voiced non-desire to continue with PT services. []  See Plan of Care  []  See progress note/recertification  [x]  See Discharge Summary         Progress towards goals / Updated goals:    Short term goals to be achieved in 1 week   1) Pt will report compliance with HEP to increase progress in therapy  At eval: reviewed HEP  Current: Goal met: Pt reports performing HEP. 8/9/18      Long term goals to be achieved in 3 weeks  1) Pt will demonstrate full left knee extension pre manual to increase ease with community ambulation. At eval: left knee extension pre-manual= -3 deg left knee extension post-manual=0 deg  Current: Met, Pre-Manual Knee Extension = 0 deg, 8/20/2018   2) Pt will report an overall improvement in function to >/=80% in order to increase ease with functional daily activities   At eval: 0%   Current: Remains, 0% improvement, 8/23/2018  3) Pt will have an increased FOTO to >/= 63 in order to improve function.   At eval: FOTO= 53  Current: Regressing, FOTO = 48, 8/23/2018     PLAN  []  Upgrade activities as tolerated     []  Continue plan of care  []  Update interventions per flow sheet       [x]  Discharge due to:_  []  Other:_      Eliel Gibbs, PT 8/23/2018  7:57 AM    Future Appointments  Date Time Provider Luiza Petit   8/23/2018 11:00 AM Amrita Leonard [facility]   8/29/2018 8:45 AM MD Tiffanie Mora 69

## 2018-08-29 ENCOUNTER — OFFICE VISIT (OUTPATIENT)
Dept: ORTHOPEDIC SURGERY | Facility: CLINIC | Age: 63
End: 2018-08-29

## 2018-08-29 VITALS
DIASTOLIC BLOOD PRESSURE: 73 MMHG | TEMPERATURE: 97.1 F | HEIGHT: 64 IN | SYSTOLIC BLOOD PRESSURE: 135 MMHG | OXYGEN SATURATION: 98 % | HEART RATE: 94 BPM | WEIGHT: 186.2 LBS | BODY MASS INDEX: 31.79 KG/M2

## 2018-08-29 DIAGNOSIS — G89.29 CHRONIC PAIN OF LEFT KNEE: Primary | ICD-10-CM

## 2018-08-29 DIAGNOSIS — Z96.652 HISTORY OF TOTAL LEFT KNEE REPLACEMENT: ICD-10-CM

## 2018-08-29 DIAGNOSIS — M25.562 CHRONIC PAIN OF LEFT KNEE: Primary | ICD-10-CM

## 2018-08-29 RX ORDER — HYDROCODONE BITARTRATE AND ACETAMINOPHEN 5; 325 MG/1; MG/1
1 TABLET ORAL
Qty: 21 TAB | Refills: 0 | Status: SHIPPED | OUTPATIENT
Start: 2018-08-29 | End: 2018-11-02 | Stop reason: SDUPTHER

## 2018-08-29 NOTE — PROGRESS NOTES
ADDENDUM:  Ms. Chas Graham had some further questions for me, which I'm happy to answer. She was concerned with the work note and wanted clarification. 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 She now tells me that she needs money and would like to start work. I think it's really up to her and the reason I wrote a note for her was at her request she stated she wasn't comfortable in climbing the stairs to the bus. I would like of course our patients to feel comfortable when they return to work. She also tells me she's going to be applying for disability as well. From my point of view I think it's very reasonable to try returning to work and certainly if she's not comfortable in doing so then I'd be happy to write a work note. I have explained that I only write work notes when patients state that they cannot return to work. I'm going to order infectious labs and we'll see her back in a week or two. I do have some concerns with her with regards to motivational factors.

## 2018-08-29 NOTE — PROGRESS NOTES
Patient: Allen Matos                MRN: [id number]       SSN: [id number]  YOB: 1955        AGE: 59 y.o. SEX: female  Body mass index is 31.96 kg/(m^2). PCP: Karishma Kebede MD  08/29/18        REVIEW OF SYSTEMS:      CON: negative for weight loss, fever  EYE: negative for double vision  ENT: negative for hoarseness  RS:   negative for Tb  GI:    negative for blood in stool  :  negative for blood in urine  Other systems reviewed and noted below. Past Medical History:   Diagnosis Date    Arthritis        History reviewed. No pertinent family history. Current Outpatient Prescriptions   Medication Sig Dispense Refill    amoxicillin (AMOXIL) 500 mg capsule 4 tabs by mouth 1 hour prior to appointment 4 Cap 3    celecoxib (CELEBREX) 200 mg capsule Take 1 Cap by mouth two (2) times a day for 90 days. 60 Cap 2    oxyCODONE-acetaminophen (PERCOCET) 5-325 mg per tablet Take 1 Tab by mouth every eight (8) hours as needed for Pain. Max Daily Amount: 3 Tabs. 40 Tab 0    polyethylene glycol (MIRALAX) 17 gram/dose powder Take 17 g by mouth daily.  buffered aspirin (BUFFERIN) 325 mg tablet Take 1 Tab by mouth two (2) times a day. 60 Tab 0    ferrous sulfate 325 mg (65 mg iron) tablet Take 1 Tab by mouth two (2) times daily (with meals). 60 Tab 2    VITAMIN D2 50,000 unit capsule Take 5,000 Units by mouth daily. 0    oxyCODONE-acetaminophen (PERCOCET) 5-325 mg per tablet Take 1 Tab by mouth every eight (8) hours as needed for Pain (Hold if sedated). Max Daily Amount: 3 Tabs. 30 Tab 0    oxyCODONE-acetaminophen (PERCOCET) 5-325 mg per tablet Take 1 Tab by mouth every eight (8) hours as needed for Pain (Hold if sedated. ). Max Daily Amount: 3 Tabs. 21 Tab 0    oxyCODONE-acetaminophen (PERCOCET) 7.5-325 mg per tablet Take 1-2 Tabs by mouth every eight (8) hours as needed for Pain. Max Daily Amount: 6 Tabs.  42 Tab 0    oxyCODONE-acetaminophen (PERCOCET) 7.5-325 mg per tablet Take 1 Tab by mouth every six (6) hours as needed for Pain. Max Daily Amount: 4 Tabs. 40 Tab 0    oxyCODONE-acetaminophen (PERCOCET)  mg per tablet Take 1-2 Tabs by mouth every four (4) hours as needed for Pain. Max Daily Amount: 12 Tabs. 61 Tab 0       No Known Allergies    Past Surgical History:   Procedure Laterality Date    HX COLONOSCOPY  2009    HX HEMORRHOIDECTOMY  2016    HX HYSTERECTOMY         Social History     Social History    Marital status:      Spouse name: N/A    Number of children: N/A    Years of education: N/A     Occupational History    Not on file. Social History Main Topics    Smoking status: Former Smoker    Smokeless tobacco: Never Used    Alcohol use No    Drug use: No    Sexual activity: Not on file     Other Topics Concern    Not on file     Social History Narrative       Visit Vitals    /73    Pulse 94    Temp 97.1 °F (36.2 °C) (Oral)    Ht 5' 4\" (1.626 m)    Wt 186 lb 3.2 oz (84.5 kg)    SpO2 98%    BMI 31.96 kg/m2         PHYSICAL EXAMINATION:  GENERAL: Alert and oriented x3, in no acute distress, well-developed, well-nourished, afebrile. HEART: No JVD. EYES: No scleral icterus   NECK: No significant lymphadenopathy   LUNGS: No respiratory compromise or indrawing  ABDOMEN: Soft, non-tender, non-distended. Electronically signed by: Sebastian Cabrera MD  HISTORY:  Katelynn Conroy is about 3 1/2 months following her total knee replacement. She complains of pain and stiffness. She's concerned that the knee has shifted. It hurts her in a semi flexed position and stairs are still uncomfortable. 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 The pain is moderate, aching in nature, non radicular. On questioning about sitting with the leg out straight, she states she sits with the leg out straight every day. She has done some more therapy. She's frustrated, feels she should be more healed by this point in time. Denies fevers or chills, night sweats or weight loss. PHYSICAL EXAMINATION:  Upon entering, she is sitting with her knee flexed and the femur internally rotated in a valgus type position, which I think is not helpful. I examined the knee today, there is a mild effusion. It's not hot or red. She's bending to about 110 degrees. The extension is about -3 or 4. When I place her leg in extension to sit it's uncomfortable for her and she doesn't want to do this. On further questioning it's only when sitting with the leg out straight, perhaps once day I would recommend once an hour, about 10x a day as per the previous visit as well. The calf is non tender . Homans sign negative. No cyanosis, peripheral edema or clubbing. The knee itself is quite stable in mid flexion. She actually has a constrained prosthesis and just slight effusion, slight warmth to the knee. It's not hot or red. PLAN:  At this point I would recommend actually sitting with the leg in extension on an hourly basis, not once a day. I'm going to get some infectious labs on the knee as well. If any of the labs are positive she will require an aspiration of the knee. Hopefully that won't be necessary. I really think it's healing. I think a little more compliance with extension would be helpful. She states she's considering applying for disability as well. 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 We will give her a note for work for the time being and I'll see her back in a week or two after the blood tests, and if the blood tests are elevated she'll require an aspiration. I don't think the knee is infected currently and I think a little bit more sitting with the leg straight and compliance will be helpful for her as well. The xrays look terrific and we'll see her back after the blood tests.     RADIOGRAPHS:  *    IMPRESSION:  My overall impression is *    PROCEDURE:  Under aseptic conditions and after informed, written consent with a time out, the * was injected with 1 cc of the Celestone preparation, i.e. 6 mg, which was well tolerated.     PLAN:  R

## 2018-08-29 NOTE — LETTER
NOTIFICATION RETURN TO WORK / SCHOOL 
 
8/29/2018 9:22 AM 
 
Ms. Marvin Macias [place] [phone] [address] [address] To Whom It May Concern: 
 
Marvin Macias is currently under the care of [address] [place] [address]. She will remain out of work until September 29, 2018, due to medical issues. If there are questions or concerns please have the patient contact our office. Sincerely, Chadwick Cherry MD

## 2018-08-30 ENCOUNTER — TELEPHONE (OUTPATIENT)
Dept: ORTHOPEDIC SURGERY | Age: 63
End: 2018-08-30

## 2018-08-30 NOTE — TELEPHONE ENCOUNTER
Patient called and is asking for a note to be out of work from [address] until her next visit with him on 09/14/18. Patient would like the note as soon as possible , because she is supposed to return to work on Tuesday . Patient tel. [phone].

## 2018-08-31 NOTE — TELEPHONE ENCOUNTER
Note written as requested and patient notified she may pick it up at whichever office is convenient for her and to have the  print it out for her. Patient verbalized understanding.

## 2018-09-04 LAB
ABSOLUTE LYMPHOCYTE COUNT, 10803: 1.7 K/UL (ref 1–4.8)
BASOPHILS # BLD: 0 K/UL (ref 0–0.2)
BASOPHILS NFR BLD: 1 % (ref 0–2)
C-REACTIVE PROTEIN, QT, 006627: 0.3 MG/DL (ref 0–0.5)
EOSINOPHIL # BLD: 0.1 K/UL (ref 0–0.5)
EOSINOPHIL NFR BLD: 1 % (ref 0–6)
ERYTHROCYTE [DISTWIDTH] IN BLOOD BY AUTOMATED COUNT: 13.7 % (ref 10–15.5)
GRANULOCYTES,GRANS: 68 % (ref 40–75)
HCT VFR BLD AUTO: 43.3 % (ref 35.1–48)
HGB BLD-MCNC: 13.7 G/DL (ref 11.7–16)
IL-6, 10347: 3.2 PG/ML
LYMPHOCYTES, LYMLT: 25 % (ref 20–45)
MCH RBC QN AUTO: 27 PG (ref 26–34)
MCHC RBC AUTO-ENTMCNC: 32 G/DL (ref 31–36)
MCV RBC AUTO: 84 FL (ref 80–95)
MONOCYTES # BLD: 0.4 K/UL (ref 0.1–1)
MONOCYTES NFR BLD: 6 % (ref 3–12)
NEUTROPHILS # BLD AUTO: 4.7 K/UL (ref 1.8–7.7)
PLATELET # BLD AUTO: 299 K/UL (ref 140–440)
PMV BLD AUTO: 9.8 FL (ref 9–13)
RBC # BLD AUTO: 5.16 M/UL (ref 3.8–5.2)
SED RATE (ESR): 16 MM/HR (ref 0–30)
URATE SERPL-MCNC: 4.8 MG/DL (ref 2.2–7.7)
WBC # BLD AUTO: 6.9 K/UL (ref 4–11)

## 2018-09-18 ENCOUNTER — OFFICE VISIT (OUTPATIENT)
Dept: ORTHOPEDIC SURGERY | Facility: CLINIC | Age: 63
End: 2018-09-18

## 2018-09-18 VITALS
TEMPERATURE: 97.8 F | DIASTOLIC BLOOD PRESSURE: 67 MMHG | HEIGHT: 64 IN | BODY MASS INDEX: 29.88 KG/M2 | OXYGEN SATURATION: 98 % | WEIGHT: 175 LBS | HEART RATE: 91 BPM | SYSTOLIC BLOOD PRESSURE: 126 MMHG

## 2018-09-18 DIAGNOSIS — Z96.652 HISTORY OF TOTAL LEFT KNEE REPLACEMENT: ICD-10-CM

## 2018-09-18 DIAGNOSIS — G89.29 CHRONIC PAIN OF LEFT KNEE: Primary | ICD-10-CM

## 2018-09-18 DIAGNOSIS — M25.662 KNEE STIFFNESS, LEFT: ICD-10-CM

## 2018-09-18 DIAGNOSIS — M25.562 CHRONIC PAIN OF LEFT KNEE: Primary | ICD-10-CM

## 2018-09-18 NOTE — PROGRESS NOTES
Patient: Poly Hoover                MRN: [id number]       SSN: [id number] YOB: 1955        AGE: 59 y.o. SEX: female Body mass index is 30.04 kg/(m^2). PCP: Vida Linares MD 
09/18/18 REVIEW OF SYSTEMS:   
 
CON: negative for weight loss, fever EYE: negative for double vision ENT: negative for hoarseness RS:   negative for Tb 
GI:    negative for blood in stool :  negative for blood in urine Other systems reviewed and noted below. Past Medical History:  
Diagnosis Date  Arthritis No family history on file. Current Outpatient Prescriptions Medication Sig Dispense Refill  
 HYDROcodone-acetaminophen (NORCO) 5-325 mg per tablet Take 1 Tab by mouth every eight (8) hours as needed for Pain. Max Daily Amount: 3 Tabs. 21 Tab 0  
 amoxicillin (AMOXIL) 500 mg capsule 4 tabs by mouth 1 hour prior to appointment 4 Cap 3  
 oxyCODONE-acetaminophen (PERCOCET) 5-325 mg per tablet Take 1 Tab by mouth every eight (8) hours as needed for Pain (Hold if sedated). Max Daily Amount: 3 Tabs. 30 Tab 0  
 oxyCODONE-acetaminophen (PERCOCET) 5-325 mg per tablet Take 1 Tab by mouth every eight (8) hours as needed for Pain (Hold if sedated. ). Max Daily Amount: 3 Tabs. 21 Tab 0  
 celecoxib (CELEBREX) 200 mg capsule Take 1 Cap by mouth two (2) times a day for 90 days. 60 Cap 2  
 oxyCODONE-acetaminophen (PERCOCET) 5-325 mg per tablet Take 1 Tab by mouth every eight (8) hours as needed for Pain. Max Daily Amount: 3 Tabs. 40 Tab 0  
 oxyCODONE-acetaminophen (PERCOCET) 7.5-325 mg per tablet Take 1-2 Tabs by mouth every eight (8) hours as needed for Pain. Max Daily Amount: 6 Tabs. 42 Tab 0  
 oxyCODONE-acetaminophen (PERCOCET) 7.5-325 mg per tablet Take 1 Tab by mouth every six (6) hours as needed for Pain. Max Daily Amount: 4 Tabs. 40 Tab 0  
 polyethylene glycol (MIRALAX) 17 gram/dose powder Take 17 g by mouth daily.  buffered aspirin (BUFFERIN) 325 mg tablet Take 1 Tab by mouth two (2) times a day. 60 Tab 0  
 ferrous sulfate 325 mg (65 mg iron) tablet Take 1 Tab by mouth two (2) times daily (with meals). 60 Tab 2  
 oxyCODONE-acetaminophen (PERCOCET)  mg per tablet Take 1-2 Tabs by mouth every four (4) hours as needed for Pain. Max Daily Amount: 12 Tabs. 60 Tab 0  
 VITAMIN D2 50,000 unit capsule Take 5,000 Units by mouth daily. 0 No Known Allergies Past Surgical History:  
Procedure Laterality Date  HX COLONOSCOPY  2009  HX HEMORRHOIDECTOMY  2016  HX HYSTERECTOMY Social History Social History  Marital status:  Spouse name: N/A  
 Number of children: N/A  
 Years of education: N/A Occupational History  Not on file. Social History Main Topics  Smoking status: Former Smoker  Smokeless tobacco: Never Used  Alcohol use No  
 Drug use: No  
 Sexual activity: Not on file Other Topics Concern  Not on file Social History Narrative Visit Vitals  /67 (BP 1 Location: Left arm)  Pulse 91  Temp 97.8 °F (36.6 °C)  Ht 5' 4\" (1.626 m)  Wt 175 lb (79.4 kg)  SpO2 98%  BMI 30.04 kg/m2 PHYSICAL EXAMINATION: 
GENERAL: Alert and oriented x3, in no acute distress, well-developed, well-nourished, afebrile. HEART: No JVD. EYES: No scleral icterus NECK: No significant lymphadenopathy LUNGS: No respiratory compromise or indrawing ABDOMEN: Soft, non-tender, non-distended. Electronically signed by: Sebastian Cabrera MD 
HISTORY: Ms. Leyda Hart returns in follow up with regards to her knee replacement and knee pain. She describes stiffness associated with it. We sent her for infectious labs, which were thankfully negative.   We had a lengthy discussion at the last visit in terms of her sitting with the leg straight, and I do not believe that she is sitting with the leg straight at all at home. In fact, I think she uses a recliner chair and her pillow underneath her knee. Her  is accompanying her today. She has no start-up pain and no complaints of instability. She has no complaints of calf pain or shortness of breath, just some diffuse, achy pain. She denies slight swelling. PHYSICAL EXAMINATION:  On examination today, the knee is not hot or red. There is a well healed incision. There is a minimal effusion. She is bending quite well to about 115°, and her fixed flexion deformity of the knee has worsened from the last visit, and she has roughly about 8° perhaps 9° or so fixed flexion deformity. The knee itself is stable. The hips rotate nicely. Again, there is no evidence for infection or DVT. PLAN:  My advice is for her to sit with the leg out straight, which I do not think she is doing. We will keep her out of work for the time being. She could do sedentary work if it was available. I would recommend physical therapy and sitting with the leg out straight 10 times a day to avoid arthrofibrosis and stiffness. She should avoid the recliner chair and avoid pillows underneath the knee. We will see her back after another month or so physical therapy. Hopefully, she will be more compliant with sitting with the leg out straight.

## 2018-09-18 NOTE — LETTER
NOTIFICATION RETURN TO WORK / SCHOOL 
 
9/18/2018 3:03 PM 
 
Ms. Atiya Joel [phone] [address] [address] To Whom It May Concern: 
 
Atiya Spann is currently under the care of [address] [place] [address]. She will return to work/school on: November 01, 2018. If there are questions or concerns please have the patient contact our office. Sincerely, Richrd Schirmer, MD

## 2018-10-15 ENCOUNTER — HOSPITAL ENCOUNTER (OUTPATIENT)
Dept: PHYSICAL THERAPY | Age: 63
Discharge: HOME | End: 2018-10-15
Payer: COMMERCIAL

## 2018-10-15 PROCEDURE — 97110 THERAPEUTIC EXERCISES: CPT | Performed by: PHYSICAL THERAPIST

## 2018-10-15 PROCEDURE — 97161 PT EVAL LOW COMPLEX 20 MIN: CPT | Performed by: PHYSICAL THERAPIST

## 2018-10-15 NOTE — PROGRESS NOTES
[facility] - [facility]              [address] [facility] [address]        [place], [address] [place]   [phone] fax    Plan of Care/ Statement of Necessity for Physical Therapy Services  Patient name: Fidel Rodriguez Start of Care: 10/15/2018   Referral source: Christiano Rodgers MD : 1955    Medical Diagnosis: Pain in left knee [M25.562]  Presence of left artificial knee joint [Z96.652]  Stiffness of left knee, not elsewhere classified [M25.662]   Onset Date:2018    Treatment Diagnosis: Left knee pain   Prior Hospitalization: see medical history Provider#: [id number]   Medications: Verified on Patient summary List    Comorbidities: Arthritis, BMI>30   Prior Level of Function: Ambulation without AD, Work full-time, (I) Functional ADLs, (I) Driving     The Plan of Care and following information is based on the information from the initial evaluation. Assessment/ key information: Patient with signs and symptoms consistent with left knee pain. Patient is 5 months post left HELEN. She c/o pain at worst 7-8/10 and at best 3/10. She has PROM 7-100 degrees. Gait is without any significant deviation, she has a normal jeromy. She does exhibit some left hip weakness but knee flex and ext are 5/5. She has a fair quad set on the left only slightly better on the right. She has not returned to her job as a  for [employer] however, she voices that she would like to return. She is also concerned about arthritis in the left knee at this time. Patient will benefit from a program of skilled physical therapy to include therapeutic exercises to address strength deficits, therapeutic activities to improve functional mobility, neuromuscular reeducation to address balance, coordination and proprioception, manual therapy to address ROM and tissue extensibility and modalities as indicated. All questions were answered.     Evaluation Complexity History MEDIUM  Complexity : 1-2 comorbidities / personal factors will impact the outcome/ POC ; Examination LOW Complexity : 1-2 Standardized tests and measures addressing body structure, function, activity limitation and / or participation in recreation  ;Presentation LOW Complexity : Stable, uncomplicated  ;Clinical Decision Making MEDIUM Complexity : FOTO score of 26-74  Overall Complexity Rating: LOW   Problem List: pain affecting function, decrease ROM, decrease strength, decrease ADL/ functional abilitiies, decrease activity tolerance and decrease flexibility/ joint mobility   Treatment Plan may include any combination of the following: Therapeutic exercise, Therapeutic activities, Neuromuscular re-education, Physical agent/modality and Manual therapy  Patient / Family readiness to learn indicated by: asking questions, trying to perform skills and interest  Persons(s) to be included in education: patient (P) and family support person (FSP);list patient's . Barriers to Learning/Limitations: Renetta  Patient Goal (s): bet the stiffness ot of the knee; doing stairs good; and the pain  Patient Self Reported Health Status: good  Rehabilitation Potential: good    Short Term Goals: To be accomplished in 1 weeks:  1. Patient will become proficient in her HEP and will be compliant in performing that program.    Long Term Goals: To be accomplished in 6 weeks:  1. Patient's pain level will be 1-2/10 with activity in order to improve patient's ability to perform normal ADLs. 2. Patient will demonstrate 5-110 degrees AROM left knee to increase ease of ADLs. 3. Patient will increase FOTO score to 58 to increase functional mobility. 4. Patient will ascend and descend steps with reciprocal pattern to increase ease of entry into home. Frequency / Duration: Patient to be seen 3 times per week for 6 weeks.     Patient/ Caregiver education and instruction: Diagnosis, prognosis, exercises   [x]  Plan of care has been reviewed with GILDA Rolle, PT 10/15/2018 1:22 PM  ________________________________________________________________________    I certify that the above Therapy Services are being furnished while the patient is under my care. I agree with the treatment plan and certify that this therapy is necessary.     Physician's Signature:____________Date:_________TIME:________    ** Signature, Date and Time must be completed for valid certification **  Please sign and return to [facility] - [address]        [place], [address] [place]   [phone] fax

## 2018-10-15 NOTE — PROGRESS NOTES
PT DAILY TREATMENT NOTE 3-16    Patient Name: Erica Reis  Date:10/15/2018  : 1955  [x]  Patient  Verified  Payor: BLUE CROSS / Plan: [employer] [facility] / Product Type: PPO /    In time:10:00  Out time:11:00  Total Treatment Time (min): 60  Visit #: 1 of 17    Treatment Area: Pain in left knee [M25.562]  Presence of left artificial knee joint [Z96.652]  Stiffness of left knee, not elsewhere classified [M25.662]    SUBJECTIVE  Pain Level (0-10 scale): 310  Any medication changes, allergies to medications, adverse drug reactions, diagnosis change, or new procedure performed?: [x] No    [] Yes (see summary sheet for update)  Subjective functional status/changes:   [] No changes reported  See Evaluation. OBJECTIVE    20 min [x]Eval                  []Re-Eval     40 min Therapeutic Exercise:  [] See flow sheet :   Rationale: increase ROM and increase strength to improve the patients ability to increase their functional activity level. With   [] TE   [] TA   [] neuro   [] other: Patient Education: [x] Review HEP    [] Progressed/Changed HEP based on:   [] positioning   [] body mechanics   [] transfers   [] heat/ice application    [] other:      Other Objective/Functional Measures: See Evaluation. Discussed with patient the anatomy of the knee and the TKA as it relates to the arthritis that she did have prior to the surgery. Knee model was used to explain this to the patient. Pain Level (0-10 scale) post treatment: 2/10    ASSESSMENT/Changes in Function:  Patient with signs and symptoms consistent with left knee pain. Patient is 5 months post left HELEN. She c/o pain at worst 7-8/10 and at best 3/10. She has PROM 7-100 degrees. Gait is without any significant deviation, she has a normal jeromy. She does exhibit some left hip weakness but knee flex and ext are 5/5. She has a fair quad set on the left only slightly better on the right.   She has not returned to her job as a  for the [employer] however, she voices that she would like to return. She is also concerned about arthritis in the left knee at this time    Patient will continue to benefit from skilled PT services to modify and progress therapeutic interventions, address functional mobility deficits, address ROM deficits and address strength deficits to attain remaining goals. [x]  See Plan of Care  []  See progress note/recertification  []  See Discharge Summary         Progress towards goals / Updated goals:  Short Term Goals: To be accomplished in 1 weeks:  1. Patient will become proficient in her HEP and will be compliant in performing that program.  Evalluation:  Patient given a written/illustrated HEP.     Long Term Goals: To be accomplished in 6 weeks:  1. Patient's pain level will be 1-2/10 with activity in order to improve patient's ability to perform normal ADLs. Evaluation:  3/10-8/10  2. Patient will demonstrate 5-110 degrees AROM left knee to increase ease of ADLs. Evaluation:  7-100 degrees. 3. Patient will increase FOTO score to 58 to increase functional mobility. Evaluation:  38  4. Patient will ascend and descend steps with reciprocal pattern to increase ease of entry into home. Evaluation:  Patient notes she sometimes can do stairs reciprocally but she usually does them one at a time.     PLAN  [x]  Upgrade activities as tolerated     [x]  Continue plan of care  []  Update interventions per flow sheet       []  Discharge due to:_  []  Other:_      Ynes Block, PT 10/15/2018  1:31 PM    Future Appointments  Date Time Provider Luiza Petit   10/19/2018 9:00 AM Ynes Block, PT [facility]   10/22/2018 11:30 AM Ynes Block, PT [facility]   10/24/2018 10:00 AM Ynes Block, PT [facility]

## 2018-10-19 ENCOUNTER — HOSPITAL ENCOUNTER (OUTPATIENT)
Dept: PHYSICAL THERAPY | Age: 63
Discharge: HOME | End: 2018-10-19
Payer: COMMERCIAL

## 2018-10-19 PROCEDURE — 97140 MANUAL THERAPY 1/> REGIONS: CPT | Performed by: PHYSICAL THERAPIST

## 2018-10-19 PROCEDURE — 97110 THERAPEUTIC EXERCISES: CPT | Performed by: PHYSICAL THERAPIST

## 2018-10-19 NOTE — PROGRESS NOTES
PT DAILY TREATMENT NOTE 3-16    Patient Name: Janett Garibay  Date:10/19/2018  : 1955  [x]  Patient  Verified  Payor: BLUE CROSS / Plan: [employer] [facility] [place] / Product Type: PPO /    In time:9:06  Out time:10:03  Total Treatment Time (min): 57  1 on 1 treatment time (min): 25  Visit #: 2 of 17    Treatment Area: Pain in left knee [M25.562]  Presence of left artificial knee joint [Z96.652]  Stiffness of left knee, not elsewhere classified [M25.662]    SUBJECTIVE  Pain Level (0-10 scale): /10  Any medication changes, allergies to medications, adverse drug reactions, diagnosis change, or new procedure performed?: [x] No    [] Yes (see summary sheet for update)  Subjective functional status/changes:   [] No changes reported  Patient continues to c/o pain in the left knee. OBJECTIVE    47 min Therapeutic Exercise:  [] See flow sheet :   Rationale: increase ROM, increase strength, improve coordination, improve balance and increase proprioception to improve the patients ability to increase their functional activity level. 10 min Manual Therapy:  Patellar mobs; manual stretching HS, Quads to increase flex/ext. Rationale: decrease pain, increase ROM and increase tissue extensibility to increase ease of motion to improve function. With   [] TE   [] TA   [] neuro   [] other: Patient Education: [x] Review HEP    [] Progressed/Changed HEP based on:   [] positioning   [] body mechanics   [] transfers   [] heat/ice application    [] other:      Other Objective/Functional Measures: Patient has no significant gait dysfunction. She is full weightbearing and does not use any assistive device. She tolerates equal WB with squatting. Pain Level (0-10 scale) post treatment: 3/10    ASSESSMENT/Changes in Function: Patient continues to c/o pain in the left knee.     Patient will continue to benefit from skilled PT services to modify and progress therapeutic interventions, address functional mobility deficits, address ROM deficits and address strength deficits to attain remaining goals. [x]  See Plan of Care  []  See progress note/recertification  []  See Discharge Summary         Progress towards goals / Updated goals:  Short Term Goals: To be accomplished in 1 weeks:  1.  Patient will become proficient in her HEP and will be compliant in performing that program.  Evalluation:  Patient given a written/illustrated HEP.     Long Term Goals: To be accomplished in 6 weeks:  1. Patient's pain level will be 1-2/10 with activity in order to improve patient's ability to perform normal ADLs. Evaluation:  3/10-8/10  2. Patient will demonstrate 5-110 degrees AROM left knee to increase ease of ADLs. Evaluation:  7-100 degrees. 3. Patient will increase FOTO score to 58 to increase functional mobility. Evaluation:  38  4. Patient will ascend and descend steps with reciprocal pattern to increase ease of entry into home. Evaluation:  Patient notes she sometimes can do stairs reciprocally but she usually does them one at a time.     PLAN  [x]  Upgrade activities as tolerated     [x]  Continue plan of care  []  Update interventions per flow sheet       []  Discharge due to:_  []  Other:_      Joyce Manzanares PT 10/19/2018  9:16 AM    Future Appointments   Date Time Provider Luiza Petit   10/22/2018 11:30 AM Zack Horton PT [facility]   10/24/2018 10:00 AM Zack Horton, PT [facility]

## 2018-10-22 ENCOUNTER — HOSPITAL ENCOUNTER (OUTPATIENT)
Dept: PHYSICAL THERAPY | Age: 63
Discharge: HOME | End: 2018-10-22
Payer: COMMERCIAL

## 2018-10-22 PROCEDURE — 97140 MANUAL THERAPY 1/> REGIONS: CPT | Performed by: PHYSICAL THERAPIST

## 2018-10-22 PROCEDURE — 97110 THERAPEUTIC EXERCISES: CPT | Performed by: PHYSICAL THERAPIST

## 2018-10-22 NOTE — PROGRESS NOTES
PT DAILY TREATMENT NOTE 3-16    Patient Name: Olga Burt  Date:10/22/2018  : 1955  [x]  Patient  Verified  Payor: BLUE CROSS / Plan: [employer] [facility] [place] / Product Type: PPO /    In time:11:34  Out time:12:40  Total Treatment Time (min): 66  1 on 1 treatment time (min): 30  Visit #: 3 of 17    Treatment Area: Pain in left knee [M25.562]  Presence of left artificial knee joint [Z96.652]  Stiffness of left knee, not elsewhere classified [M25.662]    SUBJECTIVE  Pain Level (0-10 scale): 3/10  Any medication changes, allergies to medications, adverse drug reactions, diagnosis change, or new procedure performed?: [x] No    [] Yes (see summary sheet for update)  Subjective functional status/changes:   [] No changes reported  Patient continues to c/o pain in the left knee. C/o stiffness and decreased mobiltiy. OBJECTIVE    Modality rationale: decrease edema, decrease inflammation and decrease pain to improve the patients ability to increase ease of motion to improve function.    Min Type Additional Details    [] Estim:  []Unatt       []IFC  []Premod                        []Other:  []w/ice   []w/heat  Position:  Location:    [] Estim: []Att    []TENS instruct  []NMES                    []Other:  []w/US   []w/ice   []w/heat  Position:  Location:    []  Traction: [] Cervical       []Lumbar                       [] Prone          []Supine                       []Intermittent   []Continuous Lbs:  [] before manual  [] after manual    []  Ultrasound: []Continuous   [] Pulsed                           []1MHz   []3MHz Location:  W/cm2:    []  Iontophoresis with dexamethasone         Location: [] Take home patch   [] In clinic   10 [x]  Ice     []  heat  []  Ice massage  []  Laser   []  Anodyne Position: sitting reclined  Location: left knee    []  Laser with stim  []  Other: Position:  Location:    []  Vasopneumatic Device Pressure:       [] lo [] med [] hi   Temperature: [] lo [] med [] hi   [] Skin assessment post-treatment:  []intact []redness- no adverse reaction    []redness - adverse reaction:       46 min Therapeutic Exercise:  [] See flow sheet :   Rationale: increase ROM, increase strength and improve coordination to improve the patients ability to increase their functional activity level. 10 min Manual Therapy:  Patellar mobs; manual stretching HS, Quads to increase flex and ext. Rationale: decrease pain, increase ROM and increase tissue extensibility to increase ease of motion to improve function. With   [] TE   [] TA   [] neuro   [] other: Patient Education: [x] Review HEP    [] Progressed/Changed HEP based on:   [] positioning   [] body mechanics   [] transfers   [] heat/ice application    [] other:      Other Objective/Functional Measures: Gait is without deviation. She has pain at end range of passive flex and extension. She has a springy end feel to flexion and extension. Pain Level (0-10 scale) post treatment: 3/10    ASSESSMENT/Changes in Function: Patient continues with pain and decreased ROM of the left knee. Patient will continue to benefit from skilled PT services to modify and progress therapeutic interventions, address functional mobility deficits, address ROM deficits and address strength deficits to attain remaining goals. [x]  See Plan of Care  []  See progress note/recertification  []  See Discharge Summary         Progress towards goals / Updated goals:  Short Term Goals: To be accomplished in 1 weeks:  1.  Patient will become proficient in her HEP and will be compliant in performing that program.  Evalluation:  Patient given a written/illustrated HEP.     Long Term Goals: To be accomplished in 6 weeks:  1. Patient's pain level will be 1-2/10 with activity in order to improve patient's ability to perform normal ADLs. Evaluation:  3/10-8/10  2. Patient will demonstrate 5-110 degrees AROM left knee to increase ease of ADLs. Evaluation:  7-100 degrees.   3. Patient will increase FOTO score to 58 to increase functional mobility. Evaluation:  38  4. Patient will ascend and descend steps with reciprocal pattern to increase ease of entry into home. Evaluation:  Patient notes she sometimes can do stairs reciprocally but she usually does them one at a time.     PLAN  [x]  Upgrade activities as tolerated     [x]  Continue plan of care  []  Update interventions per flow sheet       []  Discharge due to:_  []  Other:_      Khalida Elena PT 10/22/2018  2:13 PM    Future Appointments   Date Time Provider Luiza Petit   10/24/2018 10:00 AM Rosemarie Rainey, PT [facility]

## 2018-10-24 ENCOUNTER — HOSPITAL ENCOUNTER (OUTPATIENT)
Dept: PHYSICAL THERAPY | Age: 63
Discharge: HOME | End: 2018-10-24
Payer: COMMERCIAL

## 2018-10-24 PROCEDURE — 97140 MANUAL THERAPY 1/> REGIONS: CPT

## 2018-10-24 PROCEDURE — 97110 THERAPEUTIC EXERCISES: CPT

## 2018-10-24 NOTE — PROGRESS NOTES
PT DAILY TREATMENT NOTE - [facility]     Patient Name: Issac Dunn  Date:10/24/2018  : 1955  [x]  Patient  Verified  Payor: BLUE CROSS / Plan: [employer] [facility] [place] [facility] / Product Type: PPO /    In time:10:03  Out time:11:02  Total Treatment Time (min): 59  Total Timed Codes (min): 49  1:1 Treatment Time (MC only): 40   Visit #: 4 of 17    Treatment Area: Pain in left knee [M25.562]  Presence of left artificial knee joint [Z96.652]  Stiffness of left knee, not elsewhere classified [M25.662]    SUBJECTIVE  Pain Level (0-10 scale): 3  Any medication changes, allergies to medications, adverse drug reactions, diagnosis change, or new procedure performed?: [x] No    [] Yes (see summary sheet for update)  Subjective functional status/changes:   [] No changes reported  Pt reports she is able to drive herself to therapy, and feels she would be able to return to work driving. OBJECTIVE    Modality rationale: decrease pain to improve the patients ability to decrease difficulty while performing tasks. Type Additional Details     10 [x]  Ice     []  heat  []  Ice massage  []  Laser   []  Anodyne Position: sitting reclined  Location: left knee             41 min Therapeutic Exercise:  [x] See flow sheet :   Rationale: increase ROM and increase strength to improve the patients ability to increase tolerance to activities.         8 min Manual Therapy:  Patellar mobs, knee extension mobs, knee extension stretch PROM   Rationale: decrease pain, increase ROM, increase tissue extensibility and decrease trigger points to increase ease with ADLs.               With   [] TE   [] TA   [] neuro   [] other: Patient Education: [x] Review HEP    [] Progressed/Changed HEP based on:   [] positioning   [] body mechanics   [] transfers   [] heat/ice application    [] other:      Other Objective/Functional Measures:      Pain Level (0-10 scale) post treatment:3    ASSESSMENT/Changes in Function: Pt continues to lack quad strength when performing 12\" step up. Pt express concern about standing for long periods of time when returning to work. Pt reports she plans to do one more week of therapy and then return to work. Initiated step up with TKE resistance to focus on strengthening quads. Patient will continue to benefit from skilled PT services to modify and progress therapeutic interventions, address functional mobility deficits, address ROM deficits, address strength deficits and analyze and address soft tissue restrictions to attain remaining goals. []  See Plan of Care  []  See progress note/recertification  []  See Discharge Summary         Progress towards goals / Updated goals:  Short Term Goals: To be accomplished in 1 weeks:  1.  Patient will become proficient in her HEP and will be compliant in performing that program.  Evalluation:  Patient given a written/illustrated HEP. Current: Goal met: pt reports performing HEP. 10/24/18     Long Term Goals: To be accomplished in 6 weeks:  1. Patient's pain level will be 1-2/10 with activity in order to improve patient's ability to perform normal ADLs. Evaluation:  3/10-8/10  2. Patient will demonstrate 5-110 degrees AROM left knee to increase ease of ADLs. Evaluation:  7-100 degrees. 3. Patient will increase FOTO score to 58 to increase functional mobility. Evaluation:  38  4. Patient will ascend and descend steps with reciprocal pattern to increase ease of entry into home. Evaluation:  Patient notes she sometimes can do stairs reciprocally but she usually does them one at a time.           PLAN  []  Upgrade activities as tolerated     [x]  Continue plan of care  []  Update interventions per flow sheet       []  Discharge due to:_  []  Other:_      Toma Hinson, PTA 10/24/2018  10:07 AM    No future appointments.

## 2018-10-26 ENCOUNTER — TELEPHONE (OUTPATIENT)
Dept: ORTHOPEDIC SURGERY | Facility: CLINIC | Age: 63
End: 2018-10-26

## 2018-10-26 NOTE — TELEPHONE ENCOUNTER
Ok for celebrex    No percocet    Ok for norco 5  1 po q8h prn pain  #21, 0rf      Ok to provide note

## 2018-11-01 NOTE — TELEPHONE ENCOUNTER
Patient called in states that she just left the [facility] trying to  her two rx's and letter but she was told nothing was there and ready. Patients name and number were given to Vanessa . Please contact patient at [phone].

## 2018-11-02 ENCOUNTER — OFFICE VISIT (OUTPATIENT)
Dept: ORTHOPEDIC SURGERY | Age: 63
End: 2018-11-02

## 2018-11-02 VITALS
OXYGEN SATURATION: 99 % | WEIGHT: 180 LBS | RESPIRATION RATE: 16 BRPM | BODY MASS INDEX: 30.73 KG/M2 | HEIGHT: 64 IN | HEART RATE: 103 BPM | SYSTOLIC BLOOD PRESSURE: 113 MMHG | DIASTOLIC BLOOD PRESSURE: 82 MMHG | TEMPERATURE: 97.3 F

## 2018-11-02 DIAGNOSIS — Z96.652 HISTORY OF LEFT KNEE REPLACEMENT: Primary | ICD-10-CM

## 2018-11-02 DIAGNOSIS — M25.562 CHRONIC PAIN OF LEFT KNEE: ICD-10-CM

## 2018-11-02 DIAGNOSIS — G89.29 CHRONIC PAIN OF LEFT KNEE: ICD-10-CM

## 2018-11-02 DIAGNOSIS — G89.18 POST-OP PAIN: ICD-10-CM

## 2018-11-02 RX ORDER — HYDROCODONE BITARTRATE AND ACETAMINOPHEN 5; 325 MG/1; MG/1
1 TABLET ORAL
Qty: 28 TAB | Refills: 0 | Status: SHIPPED | OUTPATIENT
Start: 2018-11-02 | End: 2019-01-10 | Stop reason: SDUPTHER

## 2018-11-02 RX ORDER — CELECOXIB 200 MG/1
CAPSULE ORAL
Qty: 30 CAP | Refills: 2 | Status: SHIPPED | OUTPATIENT
Start: 2018-11-02

## 2018-11-02 RX ORDER — DICLOFENAC SODIUM 10 MG/G
GEL TOPICAL 4 TIMES DAILY
Qty: 100 G | Refills: 4 | Status: SHIPPED | OUTPATIENT
Start: 2018-11-02

## 2018-11-02 NOTE — PROGRESS NOTES
[address] [place] [address]  [phone]           Patient: Otis Osorio                MRN: [id number]       SSN: [id number]  YOB: 1955        AGE: 59 y.o. SEX: female  Body mass index is 30.9 kg/m². PCP: Vandana Madden MD  11/02/18      This office note has been dictated. REVIEW OF SYSTEMS:  Constitutional: Negative for fever, chills, weight loss and malaise/fatigue. HENT: Negative. Eyes: Negative. Respiratory: Negative. Cardiovascular: Negative. Gastrointestinal: No bowel incontinence or constipation. Genitourinary: No bladder incontinence or saddle anesthesia. Skin: Negative. Neurological: Negative. Endo/Heme/Allergies: Negative. Psychiatric/Behavioral: Negative. Musculoskeletal: As per HPI above. Past Medical History:   Diagnosis Date    Arthritis          Current Outpatient Medications:     HYDROcodone-acetaminophen (NORCO) 5-325 mg per tablet, Take 1 Tab by mouth every eight (8) hours as needed for Pain. Max Daily Amount: 3 Tabs., Disp: 21 Tab, Rfl: 0    oxyCODONE-acetaminophen (PERCOCET) 5-325 mg per tablet, Take 1 Tab by mouth every eight (8) hours as needed for Pain (Hold if sedated). Max Daily Amount: 3 Tabs., Disp: 30 Tab, Rfl: 0    oxyCODONE-acetaminophen (PERCOCET) 5-325 mg per tablet, Take 1 Tab by mouth every eight (8) hours as needed for Pain (Hold if sedated. ). Max Daily Amount: 3 Tabs., Disp: 21 Tab, Rfl: 0    oxyCODONE-acetaminophen (PERCOCET) 5-325 mg per tablet, Take 1 Tab by mouth every eight (8) hours as needed for Pain. Max Daily Amount: 3 Tabs., Disp: 40 Tab, Rfl: 0    oxyCODONE-acetaminophen (PERCOCET) 7.5-325 mg per tablet, Take 1-2 Tabs by mouth every eight (8) hours as needed for Pain.  Max Daily Amount: 6 Tabs., Disp: 42 Tab, Rfl: 0    oxyCODONE-acetaminophen (PERCOCET) 7.5-325 mg per tablet, Take 1 Tab by mouth every six (6) hours as needed for Pain. Max Daily Amount: 4 Tabs., Disp: 40 Tab, Rfl: 0    polyethylene glycol (MIRALAX) 17 gram/dose powder, Take 17 g by mouth daily. , Disp: , Rfl:     buffered aspirin (BUFFERIN) 325 mg tablet, Take 1 Tab by mouth two (2) times a day., Disp: 60 Tab, Rfl: 0    ferrous sulfate 325 mg (65 mg iron) tablet, Take 1 Tab by mouth two (2) times daily (with meals). , Disp: 60 Tab, Rfl: 2    oxyCODONE-acetaminophen (PERCOCET)  mg per tablet, Take 1-2 Tabs by mouth every four (4) hours as needed for Pain. Max Daily Amount: 12 Tabs., Disp: 60 Tab, Rfl: 0    VITAMIN D2 50,000 unit capsule, Take 5,000 Units by mouth daily. , Disp: , Rfl: 0    amoxicillin (AMOXIL) 500 mg capsule, 4 caps by mouth 1 hour prior to appointment, Disp: 4 Cap, Rfl: 3    No Known Allergies    Social History     Socioeconomic History    Marital status:      Spouse name: Not on file    Number of children: Not on file    Years of education: Not on file    Highest education level: Not on file   Social Needs    Financial resource strain: Not on file    Food insecurity - worry: Not on file    Food insecurity - inability: Not on file    Transportation needs - medical: Not on file   [employer] needs - non-medical: Not on file   Occupational History    Not on file   Tobacco Use    Smoking status: Former Smoker    Smokeless tobacco: Never Used   Substance and Sexual Activity    Alcohol use: No    Drug use: No    Sexual activity: Not on file   Other Topics Concern    Not on file   Social History Narrative    Not on file       Past Surgical History:   Procedure Laterality Date    HX COLONOSCOPY  2009    HX HEMORRHOIDECTOMY  2016    HX HYSTERECTOMY           SUBJECTIVE:   We did see Ms. Angeles for followup in regard to her left knee replacement. Patient is now 6 months status post surgery. She has had a little soreness. She was worked up for infection. Fortunately, it has been negative.   Mainly just stiffness after being seated. She does continue on Celebrex. No fevers or chills, systemic changes. No injuries to report. She is requesting a note to return to work. PHYSICAL EXAMINATION: In general, the patient is alert and oriented x3 and is in no acute distress. The patient is well-developed and well-nourished. The patient is afebrile. HEENT:  Head is normocephalic and atraumatic. Extraocular eye movements are intact. No JVD is present. Breathing is nonlabored. Examination of lower extremities reveals pain-free range of motion of the hips. There is no pain to palpation to the trochanteric bursa. Negative straight leg raise. Negative calf tenderness. No Homans. No signs of DVT present. Right knee reveals skin intact. No ecchymosis. No warmth. She has full range of motion with good stability. Patella tracks nicely without rubs or crepitus. She has a little bit of tenderness to palpation in the medial joint line. ASSESSMENT:  Status post left knee replacement. PLAN:  At this point, the patient is doing well. She is progressing nicely. Her knee stiffness is quite common at this point. She will continue on Celebrex. We will order Voltaren Gel for her. She was given a note return to work on 11/05/2018. Restrictions:  No extended standing or walking greater than 20 minutes, sedentary work. She was given a prescription for Norco, 1 week supply. We will see her back in the office in about 3 months' time for evaluation she will call with any questions or if concerns arise.       CC: MD JR German Hardin MPAS, PA-C, ATC

## 2018-11-02 NOTE — LETTER
11/2/2018 5:10 PM 
 
Ms. Pratibha Robin [address] [address] To Whom It May Concern: 
 
Pratibha Robin is currently under the care of [address] [place] [address] [facility]. She may return to work 11/5/18. She has the following accomodations:  
No extended standing or walking greater than 20 minutes. Please allow for sedentary work. If there are questions or concerns please have the patient contact our office.  
 
 
 
Sincerely, 
 
 
 
LAMAR Keyes

## 2018-12-04 NOTE — PROGRESS NOTES
[facility]              [address]        [place], [address]   [phone] fax    Discharge Summary  Patient name: Cristal Tabares Start of Care: 10/15/18   Referral source: Diego Bain MD : 1955   Medical/Treatment Diagnosis: Pain in left knee [M25.562]  Presence of left artificial knee joint [Z96.652]  Stiffness of left knee, not elsewhere classified [M25.662]  Payor: BLUE CROSS / Plan: [email or web address] [facility] [place] / Product Type: PPO /  Onset Date:2018     Prior Hospitalization: see medical history Provider#: [id number]   Medications: Verified on Patient Summary List    Comorbidities: Arthritis, BMI>30   Prior Level of Function: Ambulation without AD, Work full-time, (I) Functional ADLs, (I) Driving          Visits from Start of Care: 4    Missed Visits: 0  Reporting Period : 10/15/2018 to 2018 (last visit 10/24/18)    Summary of Care:  Goal: Patient's pain level will be 1-2/10 with activity in order to improve patient's ability to perform normal ADLs. Status at last note/certification: 0/45-2/69  Status at discharge: not met    Goal: Patient will demonstrate 5-110 degrees AROM left knee to increase ease of ADLs. Status at last note/certification: 2-178 degrees. Status at discharge: not met    Goal: Patient will increase FOTO score to 58 to increase functional mobility. Status at last note/certification: 38  Status at discharge: not met    Goal: Patient will ascend and descend steps with reciprocal pattern to increase ease of entry into home. Status at last note/certification: Patient notes she sometimes can do stairs reciprocally but she usually does them one at a time.   Status at discharge: Progressing,not met      ASSESSMENT/RECOMMENDATIONS:  []Discontinue therapy: []Patient has reached or is progressing toward set goals      [x]Patient has abdicated, it is our understanding that she was cleared to return to work with restrictions.       []Due to lack of appreciable progress towards set goals    Jay Rolle, PT 12/4/2018 9:51 AM

## 2019-01-10 ENCOUNTER — OFFICE VISIT (OUTPATIENT)
Dept: ORTHOPEDIC SURGERY | Facility: CLINIC | Age: 64
End: 2019-01-10

## 2019-01-10 VITALS
SYSTOLIC BLOOD PRESSURE: 133 MMHG | RESPIRATION RATE: 18 BRPM | HEIGHT: 64 IN | TEMPERATURE: 96.6 F | DIASTOLIC BLOOD PRESSURE: 70 MMHG | WEIGHT: 182.8 LBS | OXYGEN SATURATION: 99 % | BODY MASS INDEX: 31.21 KG/M2 | HEART RATE: 84 BPM

## 2019-01-10 DIAGNOSIS — M25.562 CHRONIC PAIN OF LEFT KNEE: ICD-10-CM

## 2019-01-10 DIAGNOSIS — G89.29 CHRONIC PAIN OF LEFT KNEE: ICD-10-CM

## 2019-01-10 DIAGNOSIS — Z96.652 HISTORY OF LEFT KNEE REPLACEMENT: ICD-10-CM

## 2019-01-10 DIAGNOSIS — G89.18 POST-OP PAIN: ICD-10-CM

## 2019-01-10 DIAGNOSIS — Z96.652 STATUS POST TOTAL LEFT KNEE REPLACEMENT: Primary | ICD-10-CM

## 2019-01-10 DIAGNOSIS — M65.9 SYNOVITIS OF KNEE: ICD-10-CM

## 2019-01-10 RX ORDER — HYDROCODONE BITARTRATE AND ACETAMINOPHEN 5; 325 MG/1; MG/1
1 TABLET ORAL
Qty: 28 TAB | Refills: 0 | Status: SHIPPED | OUTPATIENT
Start: 2019-01-10

## 2019-01-10 RX ORDER — CELECOXIB 200 MG/1
200 CAPSULE ORAL 2 TIMES DAILY
Qty: 60 CAP | Refills: 2 | Status: SHIPPED | OUTPATIENT
Start: 2019-01-10 | End: 2019-04-10

## 2019-01-10 NOTE — PROGRESS NOTES
[address] [place] [address], [address] [place] [address] 
[phone] Patient: Naren Graham                MRN: [id number]       SSN: [id number] YOB: 1955        AGE: 59 y.o. SEX: female Body mass index is 31.38 kg/m². PCP: Abbey Cheung MD 
01/10/19 This office note has been dictated. REVIEW OF SYSTEMS: 
Constitutional: Negative for fever, chills, weight loss and malaise/fatigue. HENT: Negative. Eyes: Negative. Respiratory: Negative. Cardiovascular: Negative. Gastrointestinal: No bowel incontinence or constipation. Genitourinary: No bladder incontinence or saddle anesthesia. Skin: Negative. Neurological: Negative. Endo/Heme/Allergies: Negative. Psychiatric/Behavioral: Negative. Musculoskeletal: As per HPI above. Past Medical History:  
Diagnosis Date  Arthritis Current Outpatient Medications:  
  diclofenac (VOLTAREN) 1 % gel, Apply  to affected area four (4) times daily. Maximum 16 grams per joint per day. Dispense 5 100 gram tubes, Disp: 100 g, Rfl: 4 
  celecoxib (CELEBREX) 200 mg capsule, 1 po q day prn, Disp: 30 Cap, Rfl: 2 
  VITAMIN D2 50,000 unit capsule, Take 5,000 Units by mouth daily. , Disp: , Rfl: 0 
  HYDROcodone-acetaminophen (NORCO) 5-325 mg per tablet, Take 1 Tab by mouth every six (6) hours as needed for Pain. Max Daily Amount: 4 Tabs., Disp: 28 Tab, Rfl: 0 
  amoxicillin (AMOXIL) 500 mg capsule, 4 caps by mouth 1 hour prior to appointment, Disp: 4 Cap, Rfl: 3 
  oxyCODONE-acetaminophen (PERCOCET) 5-325 mg per tablet, Take 1 Tab by mouth every eight (8) hours as needed for Pain (Hold if sedated). Max Daily Amount: 3 Tabs., Disp: 30 Tab, Rfl: 0 
  oxyCODONE-acetaminophen (PERCOCET) 5-325 mg per tablet, Take 1 Tab by mouth every eight (8) hours as needed for Pain (Hold if sedated. ).  Max Daily Amount: 3 Tabs., Disp: 21 Tab, Rfl: 0 
   oxyCODONE-acetaminophen (PERCOCET) 5-325 mg per tablet, Take 1 Tab by mouth every eight (8) hours as needed for Pain. Max Daily Amount: 3 Tabs., Disp: 40 Tab, Rfl: 0 
  oxyCODONE-acetaminophen (PERCOCET) 7.5-325 mg per tablet, Take 1-2 Tabs by mouth every eight (8) hours as needed for Pain. Max Daily Amount: 6 Tabs., Disp: 42 Tab, Rfl: 0 
  oxyCODONE-acetaminophen (PERCOCET) 7.5-325 mg per tablet, Take 1 Tab by mouth every six (6) hours as needed for Pain. Max Daily Amount: 4 Tabs., Disp: 40 Tab, Rfl: 0 
  polyethylene glycol (MIRALAX) 17 gram/dose powder, Take 17 g by mouth daily. , Disp: , Rfl:  
  buffered aspirin (BUFFERIN) 325 mg tablet, Take 1 Tab by mouth two (2) times a day., Disp: 60 Tab, Rfl: 0 
  ferrous sulfate 325 mg (65 mg iron) tablet, Take 1 Tab by mouth two (2) times daily (with meals). , Disp: 60 Tab, Rfl: 2 
  oxyCODONE-acetaminophen (PERCOCET)  mg per tablet, Take 1-2 Tabs by mouth every four (4) hours as needed for Pain. Max Daily Amount: 12 Tabs., Disp: 60 Tab, Rfl: 0 No Known Allergies Social History Socioeconomic History  Marital status:  Spouse name: Not on file  Number of children: Not on file  Years of education: Not on file  Highest education level: Not on file Social Needs  Financial resource strain: Not on file  Food insecurity - worry: Not on file  Food insecurity - inability: Not on file  Transportation needs - medical: Not on file  Transportation needs - non-medical: Not on file Occupational History  Not on file Tobacco Use  Smoking status: Former Smoker  Smokeless tobacco: Never Used Substance and Sexual Activity  Alcohol use: No  
 Drug use: No  
 Sexual activity: Not on file Other Topics Concern  Not on file Social History Narrative  Not on file Past Surgical History:  
Procedure Laterality Date  HX COLONOSCOPY  2009  HX HEMORRHOIDECTOMY  2016  HX HYSTERECTOMY We did see Ms. Angeles for followup with regards to her left knee replacement. The patient is about eight months status post surgery, and she is doing well. She does have stiffness after being seated. She was given a note to return to her job. She does have restrictions status post knee replacement. They did not want her to come back with the restrictions, unfortunately. She has had no recent fevers, chills, systemic changes, or injuries to report. She does continue on Celebrex, which does seem to help. She is asking for a refill of pain medicine. PHYSICAL EXAMINATION:  In general, the patient is alert and oriented x 3 in no acute distress. The patient is well-developed, well-nourished, with a normal affect. The patient is afebrile. HEENT:  Head is normocephalic and atraumatic. Pupils are equally round and reactive to light and accommodation. Extraocular eye movements are intact. Neck is supple. Trachea is midline. No JVD is present. Breathing is nonlabored. Examination of the lower extremities reveals pain-free range of motion of the hips. There is no pain to palpation of the greater trochanteric bursae. There is negative straight leg raise. There is negative calf tenderness. There is negative Yoshis. There is no evidence of DVT present. The left knee reveals the skin is intact. There is no ecchymosis and no warmth. There are no signs for infection or cellulitis present. There is no pain to palpation about the knee. Range of motion is missing about 5° on extension. She flexes to 105°. The patella tracks nicely. Stability is quite good. ASSESSMENT:   
 
1. Status post left knee replacement. 2. Arthrofibrosis left knee. 3. Synovitis left knee. PLAN:  At this point, we are going to move forward with obtaining some infectious labs, CBC, ESR, CRP, IL-6, and uric acid. I expect these will come back as negative. She was given a refill of Norco, one-week supply. She was given a reprint of the return to work note with her restrictions, which should still be in place. We will plan on seeing her back in the office after the labs are reviewed. We will plan on repeat x-ray of the left knee at that point. She will call with any questions or concerns that shall arise. The patient is instructed to continue working on range of motion activities for both flexion and extension on an hourly basis. Ice therapy was discussed.    
 
 
 
 
 
 
 
JR German VILLALOBOS, PAMelindaC, ATC

## 2019-01-24 ENCOUNTER — OFFICE VISIT (OUTPATIENT)
Dept: ORTHOPEDIC SURGERY | Facility: CLINIC | Age: 64
End: 2019-01-24

## 2019-01-24 VITALS
BODY MASS INDEX: 30.9 KG/M2 | HEIGHT: 64 IN | HEART RATE: 85 BPM | RESPIRATION RATE: 16 BRPM | OXYGEN SATURATION: 96 % | WEIGHT: 181 LBS | TEMPERATURE: 96.2 F | SYSTOLIC BLOOD PRESSURE: 115 MMHG | DIASTOLIC BLOOD PRESSURE: 61 MMHG

## 2019-01-24 DIAGNOSIS — Z96.652 HISTORY OF TOTAL LEFT KNEE REPLACEMENT: Primary | ICD-10-CM

## 2019-01-24 NOTE — LETTER
NOTIFICATION  
 
1/24/2019 10:56 AM 
 
Ms. Kofi Anderson [address] [place] [address] To Whom It May Concern: 
 
Kofi Anderson is currently under the care of [address] [place] [address]. Due to medical conditions Ms. Angeles is advised to not stand or sit for more than the duration of one hour. If there are questions or concerns please have the patient contact our office.  
 
 
 
Sincerely, 
 
 
Rich Morrissey PA-C

## 2019-01-24 NOTE — LETTER
NOTIFICATION  
 
1/24/2019 11:15 AM 
 
Ms. Kizzy Ren [address] [place] [address] To Whom It May Concern: 
 
Kizzy Ren is currently under the care of [address] [place] [address]. Due to medical conditions Ms. Angeles is advised not to do any climbing and to not sit or stand for more than the duration of one hour. If there are questions or concerns please have the patient contact our office.  
 
 
 
Sincerely, 
 
 
Loletta Goldmann, PA-C

## 2019-01-24 NOTE — PROGRESS NOTES
[address] [place] [address]  [phone]           Patient: Bev Mendez                MRN: [id number]       SSN: [id number]  YOB: 1955        AGE: 59 y.o. SEX: female  Body mass index is 31.07 kg/m². PCP: Eber Michelle MD  01/24/19      This office note has been dictated. REVIEW OF SYSTEMS:  Constitutional: Negative for fever, chills, weight loss and malaise/fatigue. HENT: Negative. Eyes: Negative. Respiratory: Negative. Cardiovascular: Negative. Gastrointestinal: No bowel incontinence or constipation. Genitourinary: No bladder incontinence or saddle anesthesia. Skin: Negative. Neurological: Negative. Endo/Heme/Allergies: Negative. Psychiatric/Behavioral: Negative. Musculoskeletal: As per HPI above. Past Medical History:   Diagnosis Date    Arthritis          Current Outpatient Medications:     HYDROcodone-acetaminophen (NORCO) 5-325 mg per tablet, Take 1 Tab by mouth every six (6) hours as needed for Pain. Max Daily Amount: 4 Tabs., Disp: 28 Tab, Rfl: 0    diclofenac (VOLTAREN) 1 % gel, Apply  to affected area four (4) times daily. Maximum 16 grams per joint per day. Dispense 5 100 gram tubes, Disp: 100 g, Rfl: 4    celecoxib (CELEBREX) 200 mg capsule, 1 po q day prn, Disp: 30 Cap, Rfl: 2    polyethylene glycol (MIRALAX) 17 gram/dose powder, Take 17 g by mouth daily. , Disp: , Rfl:     VITAMIN D2 50,000 unit capsule, Take 5,000 Units by mouth daily. , Disp: , Rfl: 0    celecoxib (CELEBREX) 200 mg capsule, Take 1 Cap by mouth two (2) times a day for 90 days. , Disp: 60 Cap, Rfl: 2    amoxicillin (AMOXIL) 500 mg capsule, 4 caps by mouth 1 hour prior to appointment, Disp: 4 Cap, Rfl: 3    oxyCODONE-acetaminophen (PERCOCET) 5-325 mg per tablet, Take 1 Tab by mouth every eight (8) hours as needed for Pain (Hold if sedated).  Max Daily Amount: 3 Tabs., Disp: 30 Tab, Rfl: 0   oxyCODONE-acetaminophen (PERCOCET) 5-325 mg per tablet, Take 1 Tab by mouth every eight (8) hours as needed for Pain (Hold if sedated. ). Max Daily Amount: 3 Tabs., Disp: 21 Tab, Rfl: 0    oxyCODONE-acetaminophen (PERCOCET) 5-325 mg per tablet, Take 1 Tab by mouth every eight (8) hours as needed for Pain. Max Daily Amount: 3 Tabs., Disp: 40 Tab, Rfl: 0    oxyCODONE-acetaminophen (PERCOCET) 7.5-325 mg per tablet, Take 1-2 Tabs by mouth every eight (8) hours as needed for Pain. Max Daily Amount: 6 Tabs., Disp: 42 Tab, Rfl: 0    oxyCODONE-acetaminophen (PERCOCET) 7.5-325 mg per tablet, Take 1 Tab by mouth every six (6) hours as needed for Pain. Max Daily Amount: 4 Tabs., Disp: 40 Tab, Rfl: 0    buffered aspirin (BUFFERIN) 325 mg tablet, Take 1 Tab by mouth two (2) times a day., Disp: 60 Tab, Rfl: 0    ferrous sulfate 325 mg (65 mg iron) tablet, Take 1 Tab by mouth two (2) times daily (with meals). , Disp: 60 Tab, Rfl: 2    oxyCODONE-acetaminophen (PERCOCET)  mg per tablet, Take 1-2 Tabs by mouth every four (4) hours as needed for Pain.  Max Daily Amount: 12 Tabs., Disp: 60 Tab, Rfl: 0    No Known Allergies    Social History     Socioeconomic History    Marital status:      Spouse name: Not on file    Number of children: Not on file    Years of education: Not on file    Highest education level: Not on file   Social Needs    Financial resource strain: Not on file    Food insecurity - worry: Not on file    Food insecurity - inability: Not on file    Transportation needs - medical: Not on file   [employer] needs - non-medical: Not on file   Occupational History    Not on file   Tobacco Use    Smoking status: Former Smoker    Smokeless tobacco: Never Used   Substance and Sexual Activity    Alcohol use: No    Drug use: No    Sexual activity: Not on file   Other Topics Concern    Not on file   Social History Narrative    Not on file       Past Surgical History:   Procedure Laterality Date  HX COLONOSCOPY  2009    HX HEMORRHOIDECTOMY  2016    HX HYSTERECTOMY             We did see Ms. Angeles for followup with regards to her left knee replacement. The patient is now approximately eight month status post surgery. She is progressing slowly. She is still having some stiffness after being seated and some achiness with weather changes. I sent her for some infectious labs. She returns today for reevaluation. She has had no recent fevers, chills, systemic changes, or injuries to report. She does report stiffness after being seated. She has some discomfort with extended walking. She does state she is working on her range of motion activities on her own. She does, however, sit in a recliner chair at times. PHYSICAL EXAMINATION:  In general, the patient is alert and oriented x 3 in no acute distress. The patient is well-developed, well-nourished, with a normal affect. The patient is afebrile. HEENT:  Head is normocephalic and atraumatic. Pupils are equally round and reactive to light and accommodation. Extraocular eye movements are intact. Neck is supple. Trachea is midline. No JVD is present. Breathing is nonlabored. Examination of the lower extremities reveals pain-free range of motion of the hips. There is no pain to palpation of the greater trochanteric bursae. There is negative straight leg raise. There is negative calf tenderness. There is negative Yoshis. There is no evidence of DVT present. The left knee reveals the skin is intact. There is no ecchymosis and no warmth. She has range of motion missing about 6° on extension. She flexes to about 105°. The patella tracks nicely. Stability is quite good. There is no calf tenderness. There is a negative Yoshi's sign. There are no signs for DVT present. LABORATORY STUDIES:   Review of labs shows CBC white count of 8.0. Sedimentation rate is 23. CRP is 0.3. Uric acid is 3.8, and IL-6 is 3.4. RADIOGRAPHS:  Radiographs in the office today, including AP, lateral, and skyline of the left knee, show the total knee components are well-fixed with no evidence of loosening or fracture noted. ASSESSMENT:      1. Status post left total knee replacement. 2. Arthrofibrosis left knee. 3. Synovitis left knee. PLAN:  At this point, the patient will continue on Celebrex. I have instructed her to take it twice a day with food. She will continue with over-the-counter Tylenol and occasional Norco.  I am going to order a dynamic extension splint for her to wear at night. She is instructed to continue on range of motion activities on an hourly basis for both flexion and extension. I did discuss physical therapy. She declines currently. She was given a note to return to work no extended standing greater than one hour and no extended sitting greater than one hour. No kneeling, no bending, and no squatting and limit ladders and stairs. We will see her back in the office in about three months time for evaluation. She will call with any questions or concerns that shall arise.                  JR German VILLALOBOS, PA-C, ATC

## 2019-02-04 DIAGNOSIS — M65.9 SYNOVITIS OF KNEE: ICD-10-CM

## 2019-02-04 DIAGNOSIS — Z96.652 HISTORY OF LEFT KNEE REPLACEMENT: ICD-10-CM

## 2019-04-01 NOTE — TELEPHONE ENCOUNTER
Called pt and she states she is going to  meds on Wed & letter at [facility] location. PAD

PT DID NOT CHANGE PAD SINCE HAD SMALL SPOT ONLY TO PAD, WILL CONTINUE TO MONITOR

## 2019-04-02 DIAGNOSIS — Z96.652 H/O TOTAL KNEE REPLACEMENT, LEFT: ICD-10-CM

## 2019-04-02 RX ORDER — AMOXICILLIN 500 MG/1
CAPSULE ORAL
Qty: 4 CAP | Refills: 3 | Status: SHIPPED | OUTPATIENT
Start: 2019-04-02 | End: 2019-10-03 | Stop reason: SDUPTHER

## 2019-04-02 NOTE — TELEPHONE ENCOUNTER
Patient is requesting a refill because she has a dental appointment on .     Last Visit: 19 with MD Sandeep Rodgers  Next Appointment: 19 with MD Sandeep Rodgers    Requested Prescriptions     Pending Prescriptions Disp Refills    amoxicillin (AMOXIL) 500 mg capsule 4 Cap 3     Si caps by mouth 1 hour prior to appointment

## 2019-10-03 DIAGNOSIS — Z96.652 H/O TOTAL KNEE REPLACEMENT, LEFT: ICD-10-CM

## 2019-10-03 RX ORDER — AMOXICILLIN 500 MG/1
CAPSULE ORAL
Qty: 4 CAP | Refills: 3 | Status: SHIPPED | OUTPATIENT
Start: 2019-10-03

## 2019-10-03 NOTE — TELEPHONE ENCOUNTER
Patient is asking for her pre-med for her dental appointment on 10/7/19    Last Visit: 19 with LAMAR Ospina Daily  Next Appointment: no show 19  Previous Refill Encounter(s): 19 #4 with 3 refills    Requested Prescriptions     Pending Prescriptions Disp Refills    amoxicillin (AMOXIL) 500 mg capsule 4 Cap 3     Si caps by mouth 1 hour prior to appointment

## 2020-08-28 ENCOUNTER — TELEPHONE (OUTPATIENT)
Dept: ORTHOPEDIC SURGERY | Age: 65
End: 2020-08-28

## 2020-08-28 NOTE — TELEPHONE ENCOUNTER
Patient is scheduled for a cleaning at dentist on Monday and needs to know if she requires an antibiotic. Confirmed pharmacy on file is still correct. Please advise patient at [phone].

## 2020-08-31 RX ORDER — AMOXICILLIN 500 MG/1
CAPSULE ORAL
Qty: 4 CAP | Refills: 3 | Status: SHIPPED | OUTPATIENT
Start: 2020-08-31 | End: 2021-01-07 | Stop reason: SDUPTHER

## 2020-08-31 NOTE — TELEPHONE ENCOUNTER
Yes      abx prophylaxis for life    Amoxil 500mg  4 po one hour prior to procedure  #4, 3rf    rx done and sent to pharmacy

## 2020-08-31 NOTE — TELEPHONE ENCOUNTER
Spoke with patient, notified her she does and will require abx for life prior to any dental cleaning/procedure and that and Rx has been sent to her [address]. She verbalized understanding.

## 2021-01-07 RX ORDER — AMOXICILLIN 500 MG/1
CAPSULE ORAL
Qty: 4 CAP | Refills: 3 | Status: SHIPPED | OUTPATIENT
Start: 2021-01-07 | End: 2022-02-15

## 2021-01-07 NOTE — TELEPHONE ENCOUNTER
Patient is requesting a refill on her dental pre-med      Requested Prescriptions     Pending Prescriptions Disp Refills    amoxicillin (AMOXIL) 500 mg capsule 4 Cap 3     Si tabs by mouth 1 hour prior to appointment

## 2022-03-19 PROBLEM — M17.10 ARTHRITIS OF KNEE: Status: ACTIVE | Noted: 2018-05-02

## 2022-12-30 ENCOUNTER — TELEPHONE (OUTPATIENT)
Dept: ORTHOPEDIC SURGERY | Age: 67
End: 2022-12-30

## 2022-12-30 DIAGNOSIS — M17.10 ARTHRITIS OF KNEE: Primary | ICD-10-CM

## 2022-12-30 RX ORDER — DICLOFENAC SODIUM 10 MG/G
4 GEL TOPICAL 4 TIMES DAILY
Qty: 500 G | Refills: 1 | Status: SHIPPED | OUTPATIENT
Start: 2022-12-30

## 2022-12-30 RX ORDER — CELECOXIB 200 MG/1
200 CAPSULE ORAL 2 TIMES DAILY
Qty: 60 CAPSULE | Refills: 2 | Status: SHIPPED | OUTPATIENT
Start: 2022-12-30 | End: 2023-03-30

## 2022-12-30 NOTE — TELEPHONE ENCOUNTER
Patient called to complain that the prescriptions    For Celebrex and Voltaren have not been sent to her pharmacy. She stated that this is the  third attempt to get the prescriptions to the pharmacy     Patient requests a call back when the prescription has been sent to the pharmacy because she is tired of calling the office. Patient can be reached at [phone].

## 2023-07-03 ENCOUNTER — TELEPHONE (OUTPATIENT)
Age: 68
End: 2023-07-03

## 2023-07-03 RX ORDER — AMOXICILLIN 500 MG/1
CAPSULE ORAL
Qty: 4 CAPSULE | Refills: 3 | Status: SHIPPED | OUTPATIENT
Start: 2023-07-03

## 2023-07-03 NOTE — TELEPHONE ENCOUNTER
Tried calling patient but a machine picks up saying there are restrictions on incoming phone calls so unable to call patient back

## 2023-07-03 NOTE — TELEPHONE ENCOUNTER
Patient called stating she has a dental appt scheduled for 7/5 and needs an antibiotic sent to [facility] on [address]. Please advise pt at [phone].

## 2024-07-10 ENCOUNTER — TELEPHONE (OUTPATIENT)
Age: 69
End: 2024-07-10

## 2024-07-10 NOTE — TELEPHONE ENCOUNTER
Patient is requesting a letter stating she had a knee replacement  along with a card that has this information on it. Patient is also asking if that letter and card can be mailed to her. Address on file was confirmed to be correct.    Patient can be reached at [phone].

## 2025-02-05 RX ORDER — AMOXICILLIN 500 MG/1
CAPSULE ORAL
Qty: 4 CAPSULE | Refills: 3 | Status: SHIPPED | OUTPATIENT
Start: 2025-02-05

## (undated) DEVICE — ELASTIC BANDAGE 6": Brand: CARDINAL HEALTH

## (undated) DEVICE — (D)SYR 10ML 1/5ML GRAD NSAF -- PKGING CHANGE USE ITEM 338027

## (undated) DEVICE — SKIN MARKER,REGULAR TIP WITH RULER AND LABELS: Brand: DEVON

## (undated) DEVICE — STRYKER PERFORMANCE SERIES SAGITTAL BLADE: Brand: STRYKER PERFORMANCE SERIES

## (undated) DEVICE — SLIM BODY SKIN STAPLER: Brand: APPOSE ULC

## (undated) DEVICE — BLADE RMFG DBL RECIP DBL SD --

## (undated) DEVICE — Device

## (undated) DEVICE — BIPOLAR SEALER 23-112-1 AQM 6.0: Brand: AQUAMANTYS ®

## (undated) DEVICE — SYR LR LCK 1ML GRAD NSAF 30ML --

## (undated) DEVICE — 3M™ BAIR PAWS FLEX™ WARMING GOWN, STANDARD, 20 PER CASE 81003: Brand: BAIR PAWS™

## (undated) DEVICE — JP 3-SPRING RES W/15FR PVC DRAIN/TR: Brand: CARDINAL HEALTH

## (undated) DEVICE — SMARTSLEEVE SURGICAL GOWN, 3XL LONG: Brand: CONVERTORS

## (undated) DEVICE — STOCKING COMPR L L16-18IN LNG 19MMHG ANK 9-10IN CALF

## (undated) DEVICE — 3M™ TEGADERM™ TRANSPARENT FILM DRESSING FRAME STYLE, 1626W, 4 IN X 4-3/4 IN (10 CM X 12 CM), 50/CT 4CT/CASE: Brand: 3M™ TEGADERM™

## (undated) DEVICE — INTENDED FOR TISSUE SEPARATION, AND OTHER PROCEDURES THAT REQUIRE A SHARP SURGICAL BLADE TO PUNCTURE OR CUT.: Brand: BARD-PARKER SAFETY BLADES SIZE 10, STERILE

## (undated) DEVICE — PREP SKN CHLRAPRP APPL 10.5ML --

## (undated) DEVICE — GOWN,REINFORCED,POLY,AURORA,XXLARGE,STR: Brand: MEDLINE

## (undated) DEVICE — DISPOSABLE TOURNIQUET CUFF SINGLE BLADDER, DUAL PORT AND QUICK CONNECT CONNECTOR: Brand: COLOR CUFF

## (undated) DEVICE — KIT CLN UP BON SECOURS MARYV

## (undated) DEVICE — Z DISCONTINUED USE 2744636  DRESSING AQUACEL 14 IN ALG W3.5XL14IN POLYUR FLM CVR W/ HYDRCOLL

## (undated) DEVICE — T5 HOOD WITH PEEL AWAY FACE SHIELD

## (undated) DEVICE — COVER LT HNDL BLU STRL -- MEDICHOICE

## (undated) DEVICE — KENDALL SCD EXPRESS SLEEVES, KNEE LENGTH, MEDIUM: Brand: KENDALL SCD

## (undated) DEVICE — 3M™ STERI-DRAPE™ INSTRUMENT POUCH 1018: Brand: STERI-DRAPE™

## (undated) DEVICE — SUTURE VCRL SZ 0 L36IN ABSRB UD L36MM CT-1 1/2 CIR J946H

## (undated) DEVICE — UNIT THER SEMI CLS LOOP RECIRC SYS W/ UNIV WRP ON PD ICEMAN

## (undated) DEVICE — SOFT SILICONE HYDROCELLULAR SACRUM DRESSING WITH LOCK AWAY LAYER: Brand: ALLEVYN LIFE SACRUM (LARGE) PACK OF 10

## (undated) DEVICE — SOLUTION IRRIG 3000ML 0.9% SOD CHL FLX CONT 0797208] ICU MEDICAL INC]

## (undated) DEVICE — SUTURE VCRL SZ 2 L27IN ABSRB VLT L65MM TP-1 1/2 CIR J649G

## (undated) DEVICE — NEEDLE NRV BLK 21GA L4IN 30DEG INSUL BVL EXTN SET STIMUPLEX

## (undated) DEVICE — SYRINGE MED 3ML NDL 22GA L1 1/2IN REG BVL SFGLDE

## (undated) DEVICE — SUTURE MCRYL SZ 2-0 L36IN ABSRB UD L36MM CT-1 1/2 CIR Y945H

## (undated) DEVICE — NEEDLE HYPO 21GA L1.5IN INTRAMUSCULAR S STL LATCH BVL UP

## (undated) DEVICE — 4-PORT MANIFOLD: Brand: NEPTUNE 2

## (undated) DEVICE — 3M™ COBAN™ NL STERILE NON-LATEX SELF-ADHERENT WRAP, 2086S, 6 IN X 5 YD (15 CM X 4,5 M), 12 ROLLS/CASE: Brand: 3M™ COBAN™

## (undated) DEVICE — SOLUTION IV 1000ML 0.9% SOD CHL

## (undated) DEVICE — HANDPIECE SET WITH HIGH FLOW TIP AND SUCTION TUBE: Brand: INTERPULSE

## (undated) DEVICE — PACK SURG BSHR TOT KNEE LF

## (undated) DEVICE — Z DISCONTINUED BY MEDLINE USE 2711682 TRAY SKIN PREP DRY W/ PREM GLV

## (undated) DEVICE — INTENDED FOR TISSUE SEPARATION, AND OTHER PROCEDURES THAT REQUIRE A SHARP SURGICAL BLADE TO PUNCTURE OR CUT.: Brand: BARD-PARKER ® STAINLESS STEEL BLADES

## (undated) DEVICE — DRSG PATCH ANTIMIC 1INX4.0MM -- CONVERT TO ITEM 356053

## (undated) DEVICE — BOWL AND CEMENT CARTRIDGE WITH BREAKAWAY FEMORAL NOZZLE: Brand: ACM

## (undated) DEVICE — NEEDLE HYPO 18GA L1.5IN PNK S STL HUB POLYPR SHLD REG BVL

## (undated) DEVICE — REM POLYHESIVE ADULT PATIENT RETURN ELECTRODE: Brand: VALLEYLAB

## (undated) DEVICE — X-RAY SPONGES,12 PLY: Brand: DERMACEA